# Patient Record
Sex: FEMALE | Race: WHITE | NOT HISPANIC OR LATINO | Employment: UNEMPLOYED | URBAN - NONMETROPOLITAN AREA
[De-identification: names, ages, dates, MRNs, and addresses within clinical notes are randomized per-mention and may not be internally consistent; named-entity substitution may affect disease eponyms.]

---

## 2017-01-16 DIAGNOSIS — I10 ESSENTIAL HYPERTENSION: ICD-10-CM

## 2017-01-16 NOTE — TELEPHONE ENCOUNTER
Was the patient seen in the last year in this department? Yes     Does patient have an active prescription for medications requested? Yes     Received Request Via: Pharmacy

## 2017-01-17 RX ORDER — LISINOPRIL AND HYDROCHLOROTHIAZIDE 20; 12.5 MG/1; MG/1
1 TABLET ORAL DAILY
Qty: 30 TAB | Refills: 11 | Status: SHIPPED | OUTPATIENT
Start: 2017-01-17 | End: 2018-02-02 | Stop reason: SDUPTHER

## 2017-02-01 ENCOUNTER — OFFICE VISIT (OUTPATIENT)
Dept: MEDICAL GROUP | Facility: CLINIC | Age: 57
End: 2017-02-01
Payer: MEDICAID

## 2017-02-01 VITALS
SYSTOLIC BLOOD PRESSURE: 138 MMHG | BODY MASS INDEX: 42 KG/M2 | WEIGHT: 246 LBS | DIASTOLIC BLOOD PRESSURE: 98 MMHG | TEMPERATURE: 98.3 F | OXYGEN SATURATION: 98 % | HEART RATE: 108 BPM | RESPIRATION RATE: 20 BRPM | HEIGHT: 64 IN

## 2017-02-01 DIAGNOSIS — K21.9 GASTROESOPHAGEAL REFLUX DISEASE, ESOPHAGITIS PRESENCE NOT SPECIFIED: ICD-10-CM

## 2017-02-01 DIAGNOSIS — M25.511 CHRONIC RIGHT SHOULDER PAIN: ICD-10-CM

## 2017-02-01 DIAGNOSIS — G89.29 CHRONIC RIGHT SHOULDER PAIN: ICD-10-CM

## 2017-02-01 DIAGNOSIS — M25.552 PAIN OF BOTH HIP JOINTS: ICD-10-CM

## 2017-02-01 DIAGNOSIS — M25.551 PAIN OF BOTH HIP JOINTS: ICD-10-CM

## 2017-02-01 DIAGNOSIS — R06.02 SOB (SHORTNESS OF BREATH): ICD-10-CM

## 2017-02-01 DIAGNOSIS — M54.41 BILATERAL LOW BACK PAIN WITH RIGHT-SIDED SCIATICA, UNSPECIFIED CHRONICITY: ICD-10-CM

## 2017-02-01 PROCEDURE — 99214 OFFICE O/P EST MOD 30 MIN: CPT | Performed by: NURSE PRACTITIONER

## 2017-02-01 RX ORDER — HYDROCODONE BITARTRATE AND ACETAMINOPHEN 10; 325 MG/1; MG/1
1 TABLET ORAL EVERY 6 HOURS PRN
Qty: 84 TAB | Refills: 0 | Status: SHIPPED | OUTPATIENT
Start: 2017-02-01 | End: 2017-02-01 | Stop reason: SDUPTHER

## 2017-02-01 RX ORDER — HYDROCODONE BITARTRATE AND ACETAMINOPHEN 10; 325 MG/1; MG/1
1 TABLET ORAL EVERY 6 HOURS PRN
Qty: 84 TAB | Refills: 0 | Status: SHIPPED | OUTPATIENT
Start: 2017-02-01 | End: 2017-03-30 | Stop reason: SDUPTHER

## 2017-02-01 RX ORDER — OMEPRAZOLE 20 MG/1
20 CAPSULE, DELAYED RELEASE ORAL DAILY
Qty: 30 CAP | Refills: 3 | Status: SHIPPED | OUTPATIENT
Start: 2017-02-01 | End: 2017-03-30

## 2017-02-01 RX ORDER — MELOXICAM 15 MG/1
TABLET ORAL
Qty: 30 TAB | Refills: 2 | Status: SHIPPED | OUTPATIENT
Start: 2017-02-01 | End: 2017-03-30

## 2017-02-01 RX ORDER — ALBUTEROL SULFATE 90 UG/1
2 AEROSOL, METERED RESPIRATORY (INHALATION) EVERY 6 HOURS PRN
Qty: 8.5 G | Refills: 2 | Status: SHIPPED | OUTPATIENT
Start: 2017-02-01 | End: 2017-05-25 | Stop reason: SDUPTHER

## 2017-02-01 ASSESSMENT — PAIN SCALES - GENERAL: PAINLEVEL: 9=SEVERE PAIN

## 2017-02-01 NOTE — PROGRESS NOTES
Chief Complaint   Patient presents with   • Medication Refill     Norco/ wants prilosec/ albuterol       HISTORY OF PRESENT ILLNESS: Patient is a 56 y.o. female established patient who presents today to request medication refills and to discuss her health concerns as outlined below.      Pain of both hip joints  This is a chronic problem. Patient complains of bilateral hip pain, right worse than left. 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 She ambulates with a cane. She is requesting a referral to orthopedics for further evaluation. She has not had any x-rays. She does take meloxicam and hydrocodone for her chronic back pain. She states her symptoms have been severe and she has used her medication more frequently than usual. I will increase her dose. Patient will obtain current studies and follow-up in 2 months.     Chronic right shoulder pain  This is a chronic problem that has been worsening over the past few months. Patient reports an injury in 2003 and states she had an MRI. The MRI was negative for rotator cuff tear. She has had intermittent shoulder pain over the years and states her symptoms have worsened recently. She denies any recent injury, denies any numbness or tingling to her arm. She states she does occasionally drop things and is scheduled for an MRI on her cervical spine, ordered by her specialist.     Bilateral low back pain with right-sided sciatica  This is a chronic problem. Patient is under the care of Dr. Vang. She is getting an MRI next week and is considering back surgery.        Patient Active Problem List    Diagnosis Date Noted   • Pain of both hip joints 02/01/2017   • Chronic right shoulder pain 02/01/2017   • Lesion of ala of nose 06/03/2016   • Urinary frequency 06/03/2016   • Depression 05/03/2016   • Insomnia 05/03/2016   • Notalgia 05/03/2016   • Bilateral low back pain with right-sided sciatica 03/04/2016   • Anxiety  03/04/2016   • Right-sided thoracic back pain 03/04/2016       Allergies:Tetanus toxoids    Current Outpatient Prescriptions   Medication Sig Dispense Refill   • hydrocodone/acetaminophen (NORCO)  MG Tab Take 1 Tab by mouth every 6 hours as needed for Severe Pain. 84 Tab 0   • meloxicam (MOBIC) 15 MG tablet TAKE ONE TABLET BY MOUTH ONE TIME A DAY 30 Tab 2   • omeprazole (PRILOSEC) 20 MG delayed-release capsule Take 1 Cap by mouth every day. 30 Cap 3   • albuterol 108 (90 BASE) MCG/ACT Aero Soln inhalation aerosol Inhale 2 Puffs by mouth every 6 hours as needed for Shortness of Breath. 8.5 g 2   • lisinopril-hydrochlorothiazide (PRINZIDE, ZESTORETIC) 20-12.5 MG per tablet Take 1 Tab by mouth every day. 30 Tab 11   • cetirizine (ZYRTEC) 10 MG Tab TAKE ONE TABLET BY MOUTH DAILY 30 Tab 1   • amitriptyline (ELAVIL) 50 MG Tab TAKE ONE TABLET BY MOUTH AT BEDTIME AS NEEDED - GENERIC FOR ELAVIL 30 Tab 1   • meloxicam (MOBIC) 15 MG tablet TAKE ONE TABLET BY MOUTH DAILY 30 Tab 1   • hydrOXYzine (ATARAX) 50 MG Tab Take 1 Tab by mouth 3 times a day as needed for Itching or Anxiety. 90 Tab 1   • amitriptyline (ELAVIL) 50 MG Tab Take 2 Tabs by mouth at bedtime as needed. 60 Tab 2   • cetirizine (ZYRTEC) 10 MG Tab TAKE ONE TABLET BY MOUTH ONE TIME A DAY 30 Tab 2   • citalopram (CELEXA) 40 MG Tab Take 1 Tab by mouth every day. 30 Tab 11   • MethylPREDNISolone (MEDROL DOSEPAK) 4 MG Tablet Therapy Pack Use as directed 1 Kit 0   • amoxicillin-clavulanate (AUGMENTIN) 875-125 MG Tab Take 1 Tab by mouth 2 times a day. 20 Tab 0   • Albuterol (VENTOLIN INH) Inhale 2 Puffs by mouth 4 times a day as needed.       No current facility-administered medications for this visit.       Reviewed today: Past medical history, social history, and family history. Updated as needed.    Social History     Social History Narrative       ROS:  Review of Systems   Constitutional: Negative for fever, lethargy and unexplained weight loss.   Respiratory:  "Negative for cough, wheezing and SOB.   Cardiovascular: Negative for chest pain, dizziness, and leg swelling.    Gastrointestinal: Negative for nausea, vomiting, and diarrhea.   Psych: Negative for anxiety and depression.    All other systems reviewed and are negative except as in HPI.      Exam:  Blood pressure 138/98, pulse 108, temperature 36.8 °C (98.3 °F), resp. rate 20, height 1.638 m (5' 4.49\"), weight 111.585 kg (246 lb), SpO2 98 %.  General:  Well nourished, well developed female in NAD  Head: normocephalic, atraumatic  Eyes: EOM within normal limits, no conjunctival injection, no scleral icterus  Nose: symmetrical, no discharge.  Neck: no masses, range of motion within normal limits, no tracheal deviation. No obvious thyroid enlargement. No adenopathy.   Pulmonary: chest is symmetrical with respiration, no wheezes, crackles, or rhonchi. Normal effort.   Cardiovascular: regular rate and rhythm without murmurs, rubs, or gallops.  Musculoskeletal: Normal gait, grossly normal muscle tone.  Extremities: no clubbing, cyanosis, or edema.  Psych: appropriate mood, affect, judgement.   Skin: Pink, warm, dry.      Please note that this dictation was created using voice recognition software. I have made every reasonable attempt to correct obvious errors, but I expect that there are errors of grammar and possibly content that I did not discover before finalizing the note.    Assessment/Plan:  1. Pain of both hip joints  REFERRAL TO PHYSICAL THERAPY Reason for Therapy: Eval/Treat/Report    DX-HIP-BILATERAL-WITH PELVIS-2 VIEWS    hydrocodone/acetaminophen (NORCO)  MG Tab    DISCONTINUED: hydrocodone/acetaminophen (NORCO)  MG Tab    DISCONTINUED: hydrocodone/acetaminophen (NORCO)  MG Tab    Uncontrolled. Use pain medication as instructed. Obtain x-rays,  attend physical therapy. Follow-up in 2 months.   2. Chronic right shoulder pain  REFERRAL TO PHYSICAL THERAPY Reason for Therapy: Eval/Treat/Report    " hydrocodone/acetaminophen (NORCO)  MG Tab    DISCONTINUED: hydrocodone/acetaminophen (NORCO)  MG Tab    DISCONTINUED: hydrocodone/acetaminophen (NORCO)  MG Tab    Uncontrolled. Use pain medication as instructed. Obtain x-rays,  attend physical therapy. Follow-up in 2 months.   3. Bilateral low back pain with right-sided sciatica, unspecified chronicity  REFERRAL TO PHYSICAL THERAPY Reason for Therapy: Eval/Treat/Report    meloxicam (MOBIC) 15 MG tablet    Controlled. Follow-up with spine specialist as scheduled. Follow-up here as needed.   4. Gastroesophageal reflux disease, esophagitis presence not specified  omeprazole (PRILOSEC) 20 MG delayed-release capsule    Medication refilled.   5. SOB (shortness of breath)  albuterol 108 (90 BASE) MCG/ACT Aero Soln inhalation aerosol    Medication refilled.

## 2017-02-01 NOTE — ASSESSMENT & PLAN NOTE
This is a chronic problem. Patient complains of bilateral hip pain, right worse than left. 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 She ambulates with a cane. She is requesting a referral to orthopedics for further evaluation. She has not had any x-rays. She does take meloxicam and hydrocodone for her chronic back pain. She states her symptoms have been severe and she has used her medication more frequently than usual. I will increase her dose. Patient will obtain current studies and follow-up in 2 months.

## 2017-02-01 NOTE — MR AVS SNAPSHOT
"        Denisse Varghesecaro   2017 3:10 PM   Office Visit   MRN: [id number]    Department:  [facility]   Dept Phone:  [phone]    Description:  Female : 1960   Provider:  SPENSER Xie           Reason for Visit     Medication Refill Norco/ wants prilosec/ albuterol      Allergies as of 2017     Allergen Noted Reactions    Tetanus Toxoids 2010         You were diagnosed with     Pain of both hip joints   [6115214]       Chronic right shoulder pain   [565924]       Bilateral low back pain with right-sided sciatica, unspecified chronicity   [6293162]       Chronic bilateral low back pain with right-sided sciatica   [5946472]   Uncontrolled. Continue current medications. Follow-up with pain management and spine surgery as scheduled. Follow-up here in 3 months.      Vital Signs     Blood Pressure Pulse Temperature Respirations Height Weight    138/98 mmHg 108 36.8 °C (98.3 °F) 20 1.638 m (5' 4.49\") 111.585 kg (246 lb)    Body Mass Index Oxygen Saturation Smoking Status             41.59 kg/m2 98% Current Every Day Smoker         Basic Information     Date Of Birth Sex Race Ethnicity Preferred Language    1960 Female White Non- English      Your appointments     Mar 30, 2017  2:40 PM   NEW TO YOU with SPENSER Acevedo   [facility] (--)    [address] [address]   [phone]              Problem List              ICD-10-CM Priority Class Noted - Resolved    Bilateral low back pain with right-sided sciatica M54.41   3/4/2016 - Present    Anxiety F41.9   3/4/2016 - Present    Right-sided thoracic back pain M54.6   3/4/2016 - Present    Depression F32.9   5/3/2016 - Present    Insomnia G47.00   5/3/2016 - Present    Notalgia M54.9   5/3/2016 - Present    Lesion of ala of nose L98.9   6/3/2016 - Present    Urinary frequency R35.0   6/3/2016 - Present    Pain of both hip joints M25.551, M25.552   2017 - " Present    Chronic right shoulder pain M25.511, G89.29   2/1/2017 - Present      Health Maintenance        Date Due Completion Dates    IMM DTaP/Tdap/Td Vaccine (1 - Tdap) 10/29/1979 ---    PAP SMEAR 10/29/1981 ---    MAMMOGRAM 10/29/2000 ---    COLONOSCOPY 10/29/2010 ---    IMM INFLUENZA (1) 9/1/2016 ---            Current Immunizations     No immunizations on file.      Below and/or attached are the medications your provider expects you to take. Review all of your home medications and newly ordered medications with your provider and/or pharmacist. Follow medication instructions as directed by your provider and/or pharmacist. Please keep your medication list with you and share with your provider. Update the information when medications are discontinued, doses are changed, or new medications (including over-the-counter products) are added; and carry medication information at all times in the event of emergency situations     Allergies:  TETANUS TOXOIDS - (reactions not documented)               Medications  Valid as of: February 01, 2017 -  3:41 PM    Generic Name Brand Name Tablet Size Instructions for use    Albuterol   Inhale 2 Puffs by mouth 4 times a day as needed.        Amitriptyline HCl (Tab) ELAVIL 50 MG Take 2 Tabs by mouth at bedtime as needed.        Amitriptyline HCl (Tab) ELAVIL 50 MG TAKE ONE TABLET BY MOUTH AT BEDTIME AS NEEDED - GENERIC FOR ELAVIL        Amoxicillin-Pot Clavulanate (Tab) AUGMENTIN 875-125 MG Take 1 Tab by mouth 2 times a day.        Cetirizine HCl (Tab) ZYRTEC 10 MG TAKE ONE TABLET BY MOUTH ONE TIME A DAY        Cetirizine HCl (Tab) ZYRTEC 10 MG TAKE ONE TABLET BY MOUTH DAILY        Citalopram Hydrobromide (Tab) CELEXA 40 MG Take 1 Tab by mouth every day.        Hydrocodone-Acetaminophen (Tab) NORCO  MG Take 1 Tab by mouth every 6 hours as needed for Severe Pain.        HydrOXYzine HCl (Tab) ATARAX 50 MG Take 1 Tab by mouth 3 times a day as needed for Itching or Anxiety.           Lisinopril-Hydrochlorothiazide (Tab) PRINZIDE, ZESTORETIC 20-12.5 MG Take 1 Tab by mouth every day.        Meloxicam (Tab) MOBIC 15 MG TAKE ONE TABLET BY MOUTH ONE TIME A DAY        Meloxicam (Tab) MOBIC 15 MG TAKE ONE TABLET BY MOUTH DAILY        MethylPREDNISolone (Tablet Therapy Pack) MEDROL DOSEPAK 4 MG Use as directed        Omeprazole (CAPSULE DELAYED RELEASE) PRILOSEC 20 MG Take 1 Cap by mouth every day.        .                 Medicines prescribed today were sent to:     [facility] - [place], [place] - [address]    [address] [facility] [address]    Phone: [phone] Fax: [phone]    Open 24 Hours?: No      Medication refill instructions:       If your prescription bottle indicates you have medication refills left, it is not necessary to call your provider’s office. Please contact your pharmacy and they will refill your medication.    If your prescription bottle indicates you do not have any refills left, you may request refills at any time through one of the following ways: The online [employer] system (except Urgent Care), by calling your provider’s office, or by asking your pharmacy to contact your provider’s office with a refill request. Medication refills are processed only during regular business hours and may not be available until the next business day. Your provider may request additional information or to have a follow-up visit with you prior to refilling your medication.   *Please Note: Medication refills are assigned a new Rx number when refilled electronically. Your pharmacy may indicate that no refills were authorized even though a new prescription for the same medication is available at the pharmacy. Please request the medicine by name with the pharmacy before contacting your provider for a refill.        Your To Do List     Future Labs/Procedures Complete By Expires    DX-HIP-BILATERAL-WITH PELVIS-2 VIEWS  As directed 2/1/2018      Referral     A  referral request has been sent to our patient care coordination department. Please allow 3-5 business days for us to process this request and contact you either by phone or mail. If you do not hear from us by the 5th business day, please call us at [phone].           [employer] Access Code: [email or web address]  Expires: 2/12/2017  2:15 PM    [employer]  A secure, online tool to manage your health information     [employer]’s [employer]® is a secure, online tool that connects you to your personalized health information from the privacy of your home -- day or night - making it very easy for you to manage your healthcare. Once the activation process is completed, you can even access your medical information using the [employer] petrona, which is available for free in the Apple Petrona store or Google Play store.     [employer] provides the following levels of access (as shown below):   My Chart Features   [facility] Primary Care Doctor [facility]  Specialists [facility]  Urgent  Care Non-[facility]  Primary Care  Doctor   Email your healthcare team securely and privately 24/7 X X X    Manage appointments: schedule your next appointment; view details of past/upcoming appointments X      Request prescription refills. X      View recent personal medical records, including lab and immunizations X X X X   View health record, including health history, allergies, medications X X X X   Read reports about your outpatient visits, procedures, consult and ER notes X X X X   See your discharge summary, which is a recap of your hospital and/or ER visit that includes your diagnosis, lab results, and care plan. X X       How to register for [employer]:  1. Go to  [email or web address].  2. Click on the Sign Up Now box, which takes you to the New Member Sign Up page. You will need to provide the following information:  a. Enter your [employer] Access Code exactly as it appears at the top of this page. (You will not need to use this code after you’ve completed the  sign-up process. If you do not sign up before the expiration date, you must request a new code.)   b. Enter your date of birth.   c. Enter your home email address.   d. Click Submit, and follow the next screen’s instructions.  3. Create a [employer] ID. This will be your [employer] login ID and cannot be changed, so think of one that is secure and easy to remember.  4. Create a [employer] password. You can change your password at any time.  5. Enter your Password Reset Question and Answer. This can be used at a later time if you forget your password.   6. Enter your e-mail address. This allows you to receive e-mail notifications when new information is available in [employer].  7. Click Sign Up. You can now view your health information.    For assistance activating your [employer] account, call [phone]

## 2017-02-01 NOTE — ASSESSMENT & PLAN NOTE
This is a chronic problem that has been worsening over the past few months. Patient reports an injury in 2003 and states she had an MRI. The MRI was negative for rotator cuff tear. She has had intermittent shoulder pain over the years and states her symptoms have worsened recently. She denies any recent injury, denies any numbness or tingling to her arm. She states she does occasionally drop things and is scheduled for an MRI on her cervical spine, ordered by her specialist.

## 2017-02-01 NOTE — ASSESSMENT & PLAN NOTE
This is a chronic problem. Patient is under the care of Dr. Vang. She is getting an MRI next week and is considering back surgery.

## 2017-03-06 DIAGNOSIS — F41.9 ANXIETY: ICD-10-CM

## 2017-03-06 RX ORDER — HYDROXYZINE 50 MG/1
50 TABLET, FILM COATED ORAL 3 TIMES DAILY PRN
Qty: 90 TAB | Refills: 1 | Status: CANCELLED | OUTPATIENT
Start: 2017-03-06

## 2017-03-17 DIAGNOSIS — F41.9 ANXIETY: ICD-10-CM

## 2017-03-17 RX ORDER — HYDROXYZINE 50 MG/1
50 TABLET, FILM COATED ORAL 3 TIMES DAILY PRN
Qty: 90 TAB | Refills: 1 | Status: SHIPPED | OUTPATIENT
Start: 2017-03-17 | End: 2017-05-23 | Stop reason: SDUPTHER

## 2017-03-17 NOTE — TELEPHONE ENCOUNTER
Was the patient seen in the last year in this department? Yes     Does patient have an active prescription for medications requested? Yes     Received Request Via: Patient

## 2017-03-21 ENCOUNTER — TELEPHONE (OUTPATIENT)
Dept: MEDICAL GROUP | Facility: CLINIC | Age: 57
End: 2017-03-21

## 2017-03-30 ENCOUNTER — OFFICE VISIT (OUTPATIENT)
Dept: MEDICAL GROUP | Facility: CLINIC | Age: 57
End: 2017-03-30
Payer: MEDICAID

## 2017-03-30 VITALS
BODY MASS INDEX: 44.12 KG/M2 | TEMPERATURE: 97.9 F | WEIGHT: 249 LBS | HEART RATE: 103 BPM | HEIGHT: 63 IN | DIASTOLIC BLOOD PRESSURE: 86 MMHG | RESPIRATION RATE: 18 BRPM | OXYGEN SATURATION: 98 % | SYSTOLIC BLOOD PRESSURE: 132 MMHG

## 2017-03-30 DIAGNOSIS — R35.89 POLYURIA: ICD-10-CM

## 2017-03-30 DIAGNOSIS — F41.9 ANXIETY: ICD-10-CM

## 2017-03-30 DIAGNOSIS — Z76.89 ESTABLISHING CARE WITH NEW DOCTOR, ENCOUNTER FOR: ICD-10-CM

## 2017-03-30 DIAGNOSIS — E66.01 MORBID OBESITY WITH BMI OF 40.0-44.9, ADULT (HCC): ICD-10-CM

## 2017-03-30 DIAGNOSIS — Z72.0 TOBACCO ABUSE: ICD-10-CM

## 2017-03-30 DIAGNOSIS — G47.00 INSOMNIA, UNSPECIFIED TYPE: ICD-10-CM

## 2017-03-30 DIAGNOSIS — G89.29 CHRONIC BILATERAL LOW BACK PAIN WITH RIGHT-SIDED SCIATICA: ICD-10-CM

## 2017-03-30 DIAGNOSIS — F33.1 MODERATE EPISODE OF RECURRENT MAJOR DEPRESSIVE DISORDER (HCC): ICD-10-CM

## 2017-03-30 DIAGNOSIS — Z79.891 CHRONIC USE OF OPIATE DRUGS THERAPEUTIC PURPOSES: ICD-10-CM

## 2017-03-30 DIAGNOSIS — M54.41 CHRONIC BILATERAL LOW BACK PAIN WITH RIGHT-SIDED SCIATICA: ICD-10-CM

## 2017-03-30 DIAGNOSIS — R21 RASH AND NONSPECIFIC SKIN ERUPTION: ICD-10-CM

## 2017-03-30 DIAGNOSIS — M25.511 CHRONIC RIGHT SHOULDER PAIN: ICD-10-CM

## 2017-03-30 DIAGNOSIS — G89.29 CHRONIC RIGHT SHOULDER PAIN: ICD-10-CM

## 2017-03-30 PROCEDURE — 99214 OFFICE O/P EST MOD 30 MIN: CPT | Performed by: NURSE PRACTITIONER

## 2017-03-30 RX ORDER — HYDROCODONE BITARTRATE AND ACETAMINOPHEN 10; 325 MG/1; MG/1
1 TABLET ORAL EVERY 6 HOURS PRN
Qty: 84 TAB | Refills: 0 | Status: SHIPPED | OUTPATIENT
Start: 2017-03-30 | End: 2017-03-30 | Stop reason: SDUPTHER

## 2017-03-30 RX ORDER — HYDROCODONE BITARTRATE AND ACETAMINOPHEN 10; 325 MG/1; MG/1
1 TABLET ORAL EVERY 6 HOURS PRN
Qty: 84 TAB | Refills: 0 | Status: SHIPPED | OUTPATIENT
Start: 2017-03-30 | End: 2017-04-20

## 2017-03-30 RX ORDER — ESOMEPRAZOLE MAGNESIUM 20 MG/1
20 GRANULE, DELAYED RELEASE ORAL DAILY
COMMUNITY
End: 2017-05-25 | Stop reason: SDUPTHER

## 2017-03-30 ASSESSMENT — ENCOUNTER SYMPTOMS: DEPRESSION: 1

## 2017-03-30 ASSESSMENT — LIFESTYLE VARIABLES: HISTORY_ALCOHOL_USE: 0

## 2017-03-30 NOTE — PROGRESS NOTES
"Chief Complaint   Patient presents with   • Other     new to you was a Felicia patient   • Medication Refill        Denisse Ramirez is a 56 y.o. female here today to establish care and to discuss the evaluation and management of:    HPI:      Bilateral low back pain with right-sided sciatica  Patient was seen by GELY Cote in early 2016. At that time she was referred to neurosurgery in [place]. She has not been seen by neurosurgery as she needs an MRI which has been ordered. She has not scheduled her MRI at this point.        Chronic right shoulder pain  MRI normal per her report, this is not on file. This was completed in [place] per her report.   Dislocated as a child   Reports ongoing pain since that time.    Anxiety  Patient requests prescription for Lorazepam, reports that her anxiety is not well controlled. She has not seen psych in the past.    Chronic use of opiate drugs therapeutic purposes  Patient has been prescribed previously with Norco 10/325 mg every 6 hours as needed for severe pain by prior PCP GELY Xie. For the last few prescriptions she has been given only 84 pills to last 30 days. She reports she is currently out of her medication and is requesting a refill.    Insomnia  Patient currently is being prescribed amitriptyline 10 mg nightly, reports good symptom control however reports the medication makes her feel \"dry\". Reports that she failed trazodone therapy, reports she would have 'withdrawals' if she did not take the medication consistently.    Moderate episode of recurrent major depressive disorder (CMS-[facility])  Patient denies SI/HI. She has not been seen by psych. Reports that hydroxyzine is not controlling her anxiety as well as lorazepam would.    Morbid obesity with BMI of 40.0-44.9, adult ([facility])  Patient admits that she is having more pain with increased weight on her. She is interested in bariatric consultation.    Rash and nonspecific skin eruption  This is a new " problem. Patient reports intermittent but bite-like rash that itches. Reports that she is concerned it has not cleared at this time, usually it does clear up on its own with over-the-counter creams.    Consequences of Chronic Opiate therapy:    Analgesia:  improved  Activity:  worsened  Adverse Events:  None   Aberrant Behaviors:  None   Affect/Mood: good grooming, normal speech pattern and content, normal thought patterns, normal perception  Last Urine Drug Screen:   Unknown, will complete today    Appropriate Imaging done:   No, it has been ordered so she may see Neurosurgery    Experiencing any side effects from current pain relievers? none    Current medicines (including changes today)  Current Outpatient Prescriptions   Medication Sig Dispense Refill   • Esomeprazole Magnesium (NEXIUM) 20 MG Pack Take 20 mg by mouth every day.     • hydrocodone/acetaminophen (NORCO)  MG Tab Take 1 Tab by mouth every 6 hours as needed for Severe Pain. 84 Tab 0   • hydrOXYzine (ATARAX) 50 MG Tab Take 1 Tab by mouth 3 times a day as needed for Itching or Anxiety. 90 Tab 1   • albuterol 108 (90 BASE) MCG/ACT Aero Soln inhalation aerosol Inhale 2 Puffs by mouth every 6 hours as needed for Shortness of Breath. 8.5 g 2   • lisinopril-hydrochlorothiazide (PRINZIDE, ZESTORETIC) 20-12.5 MG per tablet Take 1 Tab by mouth every day. 30 Tab 11   • meloxicam (MOBIC) 15 MG tablet TAKE ONE TABLET BY MOUTH DAILY 30 Tab 1   • amitriptyline (ELAVIL) 50 MG Tab Take 2 Tabs by mouth at bedtime as needed. 60 Tab 2   • cetirizine (ZYRTEC) 10 MG Tab TAKE ONE TABLET BY MOUTH ONE TIME A DAY 30 Tab 2   • citalopram (CELEXA) 40 MG Tab Take 1 Tab by mouth every day. 30 Tab 11     No current facility-administered medications for this visit.       She  has a past medical history of Hypertension; Anxiety; Depression; Insomnia; and Deep vein blood clot of right lower extremity (CMS-HCC).    She  has past surgical history that includes tonsillectomy and  "abdominal exploration.     Social History   Substance Use Topics   • Smoking status: Current Every Day Smoker -- 0.50 packs/day for 15 years     Types: Cigarettes   • Smokeless tobacco: Never Used      Comment: quit 3 years ago and smoked 1 pk a day for 35 years   • Alcohol Use: 3.5 oz/week     7 Glasses of wine per week       Social History     Social History Narrative       Family History   Problem Relation Age of Onset   • Lung Disease Mother    • Cancer Father    • Heart Disease Father    • Lung Disease Brother    • Lung Disease Brother        Family Status   Relation Status Death Age   • Mother Alive    • Father Alive    • Sister Alive    • Brother Alive    • Brother Alive    • Brother Alive        ROS   Constitutional: Denies fever, chills, or sweats  Eyes: negative for visual blurring, double vision, eye pain, floaters and discharge from eyes  ENT: negative for tinnitus, vertigo, frequent URI's, sinus trouble, persistent sore throat  Respiratory: negative for persistent cough, hemoptysis, dyspnea, wheezing  Cardiovascular: negative for palpitations, chest pain, or peripheral edema.  Gastrointestinal: negative for poor appetite, dysphagia, nausea, heartburn, abdominal pain, hemorrhoids, constipation or diarrhea  Genitourinary: negative for dysuria. negative for abnormal uterine bleeding, vaginal discharge   Musculoskeletal: negative for joint swelling. Reports chronic back pain and right shoulder pain.  Skin: negative for rash, scaling, hair or nail changes.  Neurologic: negative for migraine headaches, involuntary movements or tremor  Psychiatric: negative for excessive alcohol consumption or illegal drug usage, sleep disturbance, anxiety, depression  Hematologic/Lymphatic/Immunologic: negative for unusual bruising, swollen glands  Endocrine: Positive for polydipsia, polyuria, weight gain.      Objective:     Blood pressure 132/86, pulse 103, temperature 36.6 °C (97.9 °F), resp. rate 18, height 1.6 m (5' 3\"), " weight 112.946 kg (249 lb), last menstrual period 03/30/2009, SpO2 98 %, peak flow 4 L/min. Body mass index is 44.12 kg/(m^2).    Physical Exam:   Constitutional: Alert, no distress.  Eye: Equal, round and reactive, conjunctiva clear, lids normal.  ENMT: Lips without lesions, good dentition, oropharynx clear.   Neck: Trachea midline, no masses, no thyromegaly. No cervical or supraclavicular lymphadenopathy.   Respiratory: Unlabored respiratory effort, lungs clear to auscultation, no wheezes, no ronchi.  Cardiovascular: Normal S1, S2, no murmur, no edema. Capillary refill < 2 seconds in UE bilaterally.   Skin: Warm, dry, good turgor, there are several healed/scarred what appeared to be previous bug bite lesions on her upper trunk. No redness or signs of skin infection.  Neuro:  normal sensation in extremities.   Psych: Alert and oriented x3, normal affect and mood.    Spine/Back Exam:   Denies changes in bowel/bladder. No numbness to perineal area.   Strength 5/5 prox and distal LE.     Moderate tenderness in paraspinous muscles lumbar spine without current spasm. No spinous process tenderness.     Patient unable to sit for long on exam table, needs a back to her chair for support. She moves slowly and cautiously from exam table to chair.        Assessment and Plan:   The following treatment plan was discussed    1. Establishing care with new doctor, encounter for    2. Morbid obesity with BMI of 40.0-44.9, adult (CMS-[facility])  Encouraged weight loss, I placed a referral for bariatric surgery consult.  - Patient identified as having weight management issue.  Appropriate orders and counseling given.  - REFERRAL TO BARIATRIC SURGERY  - LIPID PROFILE; Future  - TSH WITH REFLEX TO FT4; Future  - COMP METABOLIC PANEL; Future  - CBC WITH DIFFERENTIAL; Future    3. Tobacco abuse  Encouraged patient to quit smoking. She is not ready to quit today.    4. Chronic bilateral low back pain with right-sided sciatica  Patient completed  UDS and drug screen today. Advised patient I will not increase dosing. Prescribed Norco 10/325 #84 which she must make last 30 days. Provided 3 months worth of prescriptions, advised patient she must complete MRI and have appointment scheduled with neurosurgery, if at her next appointment this has not been completed, we will wean patient off medications and the following 3 months. Patient is amenable to this plan.  - CONTROLLED SUBSTANCE TREATMENT AGREEMENT  - [facility] PAIN MANAGEMENT SCREEN; Future    5. Chronic right shoulder pain  I have no records to review on file at this time. Per patient's report MRI was normal.    6. Insomnia, unspecified type  Advised patient to continue with amitriptyline on her milligrams at night. I'm suspicious of possible diabetes considering her symptoms, will await lab results.    7. Anxiety  Continue with hydroxyzine 50 mg up to 3 times daily as needed. Advised patient of risks of taking benzodiazepines with opiates. I will not prescribe benzodiazepines. She is not interested in seeing psych at this time, maybe in the future.    8. Moderate episode of recurrent major depressive disorder (CMS-HCC)  Patient denies SI. At this time she does appear stable.    9. Rash and nonspecific skin eruption  Encourage patient not to itch her skin. We'll await lab results.     10. Chronic use of opiate drugs therapeutic purposes  See back pain about above please.   - CONTROLLED SUBSTANCE TREATMENT AGREEMENT  - [facility] PAIN MANAGEMENT SCREEN; Future    11. Polyuria  - HEMOGLOBIN A1C; Future       Reviewed indication, dosage, usage and potential adverse effects of prescribed medications. Patient appears to understand, verbalizes understanding and is willing to try medications as prescribed.       Discussed at length with patient use of non-pharmacological treatments as adjuncts for current pain medicine. Advised prescription is a controlled substance which is potentially habit-forming. Its use  is regulated by the [facility]. It must be submitted to the pharmacy within 5 days of the date written and can not be called in or faxed to the pharmacy. Any refill requires a new prescription that must be obtained from this office during regular office hours. This medicine can cause nausea, significant constipation, sedation, confusion. I have advised patient to keep medication in a safe place and to not drive with medication.    I reviewed risks, side effects, and interactions of medications, including over-the-counter medications. I recommend avoid use of benzodiazepines due to risk of interaction with opioid analgesics. I advise the patient to avoid alcohol and marijuana use or any illicits with prescribed medication. I reviewed the controlled substance prescribing program, including the risks of operating any vehicle or machinery with use of the prescribed medications, and adverse affects such as dependence, tolerance, addiction, and withdrawal with use of opioid analgesics and/or benzodiazepines.     I have reviewed the medical records, the [place] Prescription Monitoring Program and I have determined that controlled substance treatment is medically indicated.      Reviewed risks and benefits of treatment plan. Patient verbally agrees to plan of care.     Records requested.    Followup: Return in about 3 months (around 6/30/2017) for Short.    TORI Acevedo.     PLEASE NOTE: This dictation was created using voice recognition software. I have made every reasonable attempt to correct obvious errors, but I expect that there may be errors of grammar and possibly content that I did not discover prior finalizing this note.

## 2017-03-30 NOTE — ASSESSMENT & PLAN NOTE
Patient denies SI/HI. She has not been seen by psych. Reports that hydroxyzine is not controlling her anxiety as well as lorazepam would.

## 2017-03-30 NOTE — ASSESSMENT & PLAN NOTE
Patient requests prescription for Lorazepam, reports that her anxiety is not well controlled. She has not seen psych in the past.

## 2017-03-30 NOTE — ASSESSMENT & PLAN NOTE
This is a new problem. Patient reports intermittent but bite-like rash that itches. Reports that she is concerned it has not cleared at this time, usually it does clear up on its own with over-the-counter creams.

## 2017-03-30 NOTE — ASSESSMENT & PLAN NOTE
Patient was seen by GELY Cote in early 2016. At that time she was referred to neurosurgery in [place]. She has not been seen by neurosurgery as she needs an MRI which has been ordered. She has not scheduled her MRI at this point.

## 2017-03-30 NOTE — ASSESSMENT & PLAN NOTE
Patient has been prescribed previously with Norco 10/325 mg every 6 hours as needed for severe pain by prior PCP GELY Xie. For the last few prescriptions she has been given only 84 pills to last 30 days. She reports she is currently out of her medication and is requesting a refill.

## 2017-03-30 NOTE — ASSESSMENT & PLAN NOTE
"Patient currently is being prescribed amitriptyline 10 mg nightly, reports good symptom control however reports the medication makes her feel \"dry\". Reports that she failed trazodone therapy, reports she would have 'withdrawals' if she did not take the medication consistently.  "

## 2017-03-30 NOTE — PATIENT INSTRUCTIONS
Please obtain fasting labs prior to your next appointment. You may report to our clinic here in [place] Monday-Friday from 7:00am - 9:00am.     Please arrive fasting. Please do not eat or drink anything other than water for 8 hours prior to your arrival for labs.      Your medical care was provided today by: GELY Treviño    Thank You for the opportunity to serve you.    You may receive a brief survey in the mail shortly regarding your visit today. Please take a few moments to complete the survey and return it; no postage is necessary. We are working to serve our patient population better, improve customer service and our patients overall experience and your input can help us to accomplish this. We thank you for your help and for the opportunity to serve you today and in the future.     Special Instructions:  Always call 9-1-1 immediately if you develop a life threatening emergency.    Unless told otherwise please take all medications as directed and complete prescription therapies.     Watch for the following signs that require additional evaluation: progressive lethargy or unresponsiveness, localized pain (chest, abdomen), shortness of breath, painful breathing, progressive vomiting with weakness, bloody stools, or new rash.     If you are prescribed pain medication or any other medication that is sedating, do not take medication before or while operating a vehicle or heavy machinery or equipment due to potential side effects such as drowsiness and/or dizziness.

## 2017-03-30 NOTE — MR AVS SNAPSHOT
"        Denisse Ramirez   3/30/2017 2:40 PM   Office Visit   MRN: [id number]    Department:  [facility] Phone:  [phone]    Description:  Female : 1960   Provider:  SPENSER Acevedo           Reason for Visit     Other new to you was a Felicia patient    Medication Refill           Allergies as of 3/30/2017     Allergen Noted Reactions    Tetanus Toxoids 2010         You were diagnosed with     Establishing care with new doctor, encounter for   [799873]       Morbid obesity with BMI of 40.0-44.9, adult (CMS-HCC)   [935766]       Tobacco abuse   [558588]       Chronic bilateral low back pain with right-sided sciatica   [9865365]       Chronic right shoulder pain   [050597]       Insomnia, unspecified type   [2295807]       Anxiety   [600606]       Pain of both hip joints   [4941421]       Moderate episode of recurrent major depressive disorder (CMS-HCC)   [9121425]       Rash and nonspecific skin eruption   [523533]       Chronic use of opiate drugs therapeutic purposes   [4794617]       Polyuria   [788.42.ICD-9-CM]         Vital Signs     Blood Pressure Pulse Temperature Respirations Height Weight    132/86 mmHg 103 36.6 °C (97.9 °F) 18 1.6 m (5' 3\") 112.946 kg (249 lb)    Body Mass Index Oxygen Saturation Peak Flow Last Menstrual Period Smoking Status       44.12 kg/m2 98% 4 L/min 2009 Current Every Day Smoker       Basic Information     Date Of Birth Sex Race Ethnicity Preferred Language    1960 Female White Non- English      Problem List              ICD-10-CM Priority Class Noted - Resolved    Bilateral low back pain with right-sided sciatica M54.41   3/4/2016 - Present    Anxiety F41.9   3/4/2016 - Present    Moderate episode of recurrent major depressive disorder (CMS-HCC) F33.1   5/3/2016 - Present    Insomnia G47.00   5/3/2016 - Present    Notalgia M54.9   5/3/2016 - Present    Lesion of ala of nose L98.9   6/3/2016 - Present    Pain of both " hip joints M25.551, M25.552   2/1/2017 - Present    Chronic right shoulder pain M25.511, G89.29   2/1/2017 - Present    Morbid obesity with BMI of 40.0-44.9, adult (HCC) E66.01, Z68.41   3/30/2017 - Present    Tobacco abuse Z72.0   3/30/2017 - Present    Rash and nonspecific skin eruption R21   3/30/2017 - Present    Chronic use of opiate drugs therapeutic purposes Z79.899   3/30/2017 - Present      Health Maintenance        Date Due Completion Dates    IMM DTaP/Tdap/Td Vaccine (1 - Tdap) 10/29/1979 ---    PAP SMEAR 10/29/1981 ---    MAMMOGRAM 10/29/2000 ---    COLONOSCOPY 10/29/2010 ---    IMM INFLUENZA (1) 9/1/2016 ---            Current Immunizations     No immunizations on file.      Below and/or attached are the medications your provider expects you to take. Review all of your home medications and newly ordered medications with your provider and/or pharmacist. Follow medication instructions as directed by your provider and/or pharmacist. Please keep your medication list with you and share with your provider. Update the information when medications are discontinued, doses are changed, or new medications (including over-the-counter products) are added; and carry medication information at all times in the event of emergency situations     Allergies:  TETANUS TOXOIDS - (reactions not documented)               Medications  Valid as of: March 30, 2017 -  3:46 PM    Generic Name Brand Name Tablet Size Instructions for use    Albuterol Sulfate (Aero Soln) albuterol 108 (90 BASE) MCG/ACT Inhale 2 Puffs by mouth every 6 hours as needed for Shortness of Breath.        Amitriptyline HCl (Tab) ELAVIL 50 MG Take 2 Tabs by mouth at bedtime as needed.        Cetirizine HCl (Tab) ZYRTEC 10 MG TAKE ONE TABLET BY MOUTH ONE TIME A DAY        Citalopram Hydrobromide (Tab) CELEXA 40 MG Take 1 Tab by mouth every day.        Esomeprazole Magnesium (Pack) Esomeprazole Magnesium 20 MG Take 20 mg by mouth every day.         Hydrocodone-Acetaminophen (Tab) NORCO  MG Take 1 Tab by mouth every 6 hours as needed for Severe Pain.        HydrOXYzine HCl (Tab) ATARAX 50 MG Take 1 Tab by mouth 3 times a day as needed for Itching or Anxiety.        Lisinopril-Hydrochlorothiazide (Tab) PRINZIDE, ZESTORETIC 20-12.5 MG Take 1 Tab by mouth every day.        Meloxicam (Tab) MOBIC 15 MG TAKE ONE TABLET BY MOUTH DAILY        .                 Medicines prescribed today were sent to:     [facility] - [place], [place] - [address]    [address] [place] [address]    Phone: [phone] Fax: [phone]    Open 24 Hours?: No      Medication refill instructions:       If your prescription bottle indicates you have medication refills left, it is not necessary to call your provider’s office. Please contact your pharmacy and they will refill your medication.    If your prescription bottle indicates you do not have any refills left, you may request refills at any time through one of the following ways: The online Virtual DBS system (except Urgent Care), by calling your provider’s office, or by asking your pharmacy to contact your provider’s office with a refill request. Medication refills are processed only during regular business hours and may not be available until the next business day. Your provider may request additional information or to have a follow-up visit with you prior to refilling your medication.   *Please Note: Medication refills are assigned a new Rx number when refilled electronically. Your pharmacy may indicate that no refills were authorized even though a new prescription for the same medication is available at the pharmacy. Please request the medicine by name with the pharmacy before contacting your provider for a refill.        Your To Do List     Future Labs/Procedures Complete By Expires    CBC WITH DIFFERENTIAL  As directed 3/31/2018    COMP METABOLIC PANEL  As directed 3/31/2018    HEMOGLOBIN  A1C  As directed 3/31/2018    LIPID PROFILE  As directed 3/31/2018    [facility] PAIN MANAGEMENT SCREEN  As directed 3/30/2018    Comments:    Current Meds (name, sig, last dose):   Current outpatient prescriptions:   •  Esomeprazole Magnesium, 20 mg, Oral, DAILY  •  hydrOXYzine, 50 mg, Oral, TID PRN  •  hydrocodone/acetaminophen, 1 Tab, Oral, Q6HRS PRN  •  albuterol, 2 Puff, Inhalation, Q6HRS PRN  •  lisinopril-hydrochlorothiazide, 1 Tab, Oral, DAILY  •  meloxicam, TAKE ONE TABLET BY MOUTH DAILY  •  amitriptyline, 100 mg, Oral, HS PRN  •  cetirizine, TAKE ONE TABLET BY MOUTH ONE TIME A DAY  •  citalopram, 40 mg, Oral, DAILY          TSH WITH REFLEX TO FT4  As directed 3/30/2018      Referral     A referral request has been sent to our patient care coordination department. Please allow 3-5 business days for us to process this request and contact you either by phone or mail. If you do not hear from us by the 5th business day, please call us at [phone].        Instructions    Please obtain fasting labs prior to your next appointment. You may report to our clinic here in [place] Monday-Friday from 7:00am - 9:00am.     Please arrive fasting. Please do not eat or drink anything other than water for 8 hours prior to your arrival for labs.      Your medical care was provided today by: GELY Treviño    Thank You for the opportunity to serve you.    You may receive a brief survey in the mail shortly regarding your visit today. Please take a few moments to complete the survey and return it; no postage is necessary. We are working to serve our patient population better, improve customer service and our patients overall experience and your input can help us to accomplish this. We thank you for your help and for the opportunity to serve you today and in the future.     Special Instructions:  Always call 9-1-1 immediately if you develop a life threatening emergency.    Unless told otherwise please take all  medications as directed and complete prescription therapies.     Watch for the following signs that require additional evaluation: progressive lethargy or unresponsiveness, localized pain (chest, abdomen), shortness of breath, painful breathing, progressive vomiting with weakness, bloody stools, or new rash.     If you are prescribed pain medication or any other medication that is sedating, do not take medication before or while operating a vehicle or heavy machinery or equipment due to potential side effects such as drowsiness and/or dizziness.           MyChart Access Code: Activation code not generated  Current MyChart Status: Active          Quit Tobacco Information     Do you want to quit using tobacco?    Quitting tobacco decreases risks of cancer, heart and lung disease, increases life expectancy, improves sense of taste and smell, and increases spending money, among other benefits.    If you are thinking about quitting, we can help.  • [facility] Quit Tobacco Program: 600.852.7333  o Program occurs weekly for four weeks and includes pharmacist consultation on products to support quitting smoking or chewing tobacco. A provider referral is needed for pharmacist consultation.  • Tobacco Users Help Hotline: 9-330-QUITNOW (377-3171) or https://[place].quitlogix.org/  o Free, confidential telephone and online coaching for [place] residents. Sessions are designed on a schedule that is convenient for you. Eligible clients receive free nicotine replacement therapy.  • Nationally: www.smokefree.gov  o Information and professional assistance to support both immediate and long-term needs as you become, and remain, a non-smoker. Smokefree.gov allows you to choose the help that best fits your needs.

## 2017-03-30 NOTE — ASSESSMENT & PLAN NOTE
Patient admits that she is having more pain with increased weight on her. She is interested in bariatric consultation.

## 2017-03-30 NOTE — ASSESSMENT & PLAN NOTE
MRI normal per her report, this is not on file. This was completed in [place] per her report.   Dislocated as a child   Reports ongoing pain since that time.

## 2017-04-04 ENCOUNTER — TELEPHONE (OUTPATIENT)
Dept: MEDICAL GROUP | Facility: CLINIC | Age: 57
End: 2017-04-04

## 2017-04-04 NOTE — TELEPHONE ENCOUNTER
Patient's UDS shows positive for oxycodone, not prescribed by our clinic or on [facility].   Please contact [facility] to repeat testing on urine provided.   Patient needs appt for any further opiate refills.   Thank you.   GELY Treviño

## 2017-04-07 NOTE — TELEPHONE ENCOUNTER
Patient stated that she doesn't know what Oxycodone is.  I informed her she needs to make an appointment for any more refills

## 2017-04-12 ENCOUNTER — TELEPHONE (OUTPATIENT)
Dept: MEDICAL GROUP | Facility: CLINIC | Age: 57
End: 2017-04-12

## 2017-04-12 NOTE — TELEPHONE ENCOUNTER
1. Caller Name: Rachel from [facility]                      Call Back Number: [phone]    2. Message: Rachel from [facility] called and stated that the urine is in the process of being tested for the second time.    3. Patient approves office to leave a detailed voicemail/MyChart message: N\A

## 2017-04-14 ENCOUNTER — HOSPITAL ENCOUNTER (OUTPATIENT)
Facility: MEDICAL CENTER | Age: 57
End: 2017-04-14
Attending: NURSE PRACTITIONER
Payer: MEDICAID

## 2017-04-14 ENCOUNTER — NON-PROVIDER VISIT (OUTPATIENT)
Dept: MEDICAL GROUP | Facility: CLINIC | Age: 57
End: 2017-04-14
Payer: MEDICAID

## 2017-04-14 DIAGNOSIS — R35.89 POLYURIA: ICD-10-CM

## 2017-04-14 DIAGNOSIS — I10 ESSENTIAL HYPERTENSION: ICD-10-CM

## 2017-04-14 DIAGNOSIS — E66.01 MORBID OBESITY WITH BMI OF 40.0-44.9, ADULT (HCC): ICD-10-CM

## 2017-04-14 LAB
ALBUMIN SERPL BCP-MCNC: 4.2 G/DL (ref 3.2–4.9)
ALBUMIN/GLOB SERPL: 1.4 G/DL
ALP SERPL-CCNC: 67 U/L (ref 30–99)
ALT SERPL-CCNC: 18 U/L (ref 2–50)
ANION GAP SERPL CALC-SCNC: 7 MMOL/L (ref 0–11.9)
AST SERPL-CCNC: 20 U/L (ref 12–45)
BASOPHILS # BLD AUTO: 0.4 % (ref 0–1.8)
BASOPHILS # BLD: 0.04 K/UL (ref 0–0.12)
BILIRUB SERPL-MCNC: 0.5 MG/DL (ref 0.1–1.5)
BUN SERPL-MCNC: 14 MG/DL (ref 8–22)
CALCIUM SERPL-MCNC: 9.6 MG/DL (ref 8.5–10.5)
CHLORIDE SERPL-SCNC: 101 MMOL/L (ref 96–112)
CHOLEST SERPL-MCNC: 200 MG/DL (ref 100–199)
CO2 SERPL-SCNC: 30 MMOL/L (ref 20–33)
CREAT SERPL-MCNC: 0.84 MG/DL (ref 0.5–1.4)
EOSINOPHIL # BLD AUTO: 0.13 K/UL (ref 0–0.51)
EOSINOPHIL NFR BLD: 1.4 % (ref 0–6.9)
ERYTHROCYTE [DISTWIDTH] IN BLOOD BY AUTOMATED COUNT: 49.3 FL (ref 35.9–50)
EST. AVERAGE GLUCOSE BLD GHB EST-MCNC: 126 MG/DL
GFR SERPL CREATININE-BSD FRML MDRD: >60 ML/MIN/1.73 M 2
GLOBULIN SER CALC-MCNC: 3 G/DL (ref 1.9–3.5)
GLUCOSE SERPL-MCNC: 108 MG/DL (ref 65–99)
HBA1C MFR BLD: 6 % (ref 0–5.6)
HCT VFR BLD AUTO: 42.2 % (ref 37–47)
HDLC SERPL-MCNC: 51 MG/DL
HGB BLD-MCNC: 14 G/DL (ref 12–16)
IMM GRANULOCYTES # BLD AUTO: 0.05 K/UL (ref 0–0.11)
IMM GRANULOCYTES NFR BLD AUTO: 0.5 % (ref 0–0.9)
LDLC SERPL CALC-MCNC: 132 MG/DL
LYMPHOCYTES # BLD AUTO: 4.28 K/UL (ref 1–4.8)
LYMPHOCYTES NFR BLD: 44.7 % (ref 22–41)
MCH RBC QN AUTO: 29.7 PG (ref 27–33)
MCHC RBC AUTO-ENTMCNC: 33.2 G/DL (ref 33.6–35)
MCV RBC AUTO: 89.6 FL (ref 81.4–97.8)
MONOCYTES # BLD AUTO: 0.5 K/UL (ref 0–0.85)
MONOCYTES NFR BLD AUTO: 5.2 % (ref 0–13.4)
NEUTROPHILS # BLD AUTO: 4.58 K/UL (ref 2–7.15)
NEUTROPHILS NFR BLD: 47.8 % (ref 44–72)
NRBC # BLD AUTO: 0 K/UL
NRBC BLD AUTO-RTO: 0 /100 WBC
PLATELET # BLD AUTO: 337 K/UL (ref 164–446)
PMV BLD AUTO: 8.9 FL (ref 9–12.9)
POTASSIUM SERPL-SCNC: 3.6 MMOL/L (ref 3.6–5.5)
PROT SERPL-MCNC: 7.2 G/DL (ref 6–8.2)
RBC # BLD AUTO: 4.71 M/UL (ref 4.2–5.4)
SODIUM SERPL-SCNC: 138 MMOL/L (ref 135–145)
TRIGL SERPL-MCNC: 86 MG/DL (ref 0–149)
TSH SERPL DL<=0.005 MIU/L-ACNC: 2.64 UIU/ML (ref 0.3–3.7)
WBC # BLD AUTO: 9.6 K/UL (ref 4.8–10.8)

## 2017-04-14 PROCEDURE — 80061 LIPID PANEL: CPT

## 2017-04-14 PROCEDURE — 83036 HEMOGLOBIN GLYCOSYLATED A1C: CPT

## 2017-04-14 PROCEDURE — 36415 COLL VENOUS BLD VENIPUNCTURE: CPT | Performed by: PHYSICIAN ASSISTANT

## 2017-04-14 PROCEDURE — 80053 COMPREHEN METABOLIC PANEL: CPT

## 2017-04-14 PROCEDURE — 85025 COMPLETE CBC W/AUTO DIFF WBC: CPT

## 2017-04-14 PROCEDURE — 99000 SPECIMEN HANDLING OFFICE-LAB: CPT | Performed by: PHYSICIAN ASSISTANT

## 2017-04-14 PROCEDURE — 84443 ASSAY THYROID STIM HORMONE: CPT

## 2017-04-18 NOTE — TELEPHONE ENCOUNTER
Results scanned into media and routed to you.  The date on the test is the same as the one first done, however, on the web site it shows it was retested on 4/14 @ 04:09pm

## 2017-04-18 NOTE — TELEPHONE ENCOUNTER
Please let me know when results are available.   This is a repeat for abnormal results.   GELY Treviño

## 2017-04-20 ENCOUNTER — OFFICE VISIT (OUTPATIENT)
Dept: MEDICAL GROUP | Facility: CLINIC | Age: 57
End: 2017-04-20
Payer: MEDICAID

## 2017-04-20 VITALS
SYSTOLIC BLOOD PRESSURE: 140 MMHG | HEART RATE: 99 BPM | RESPIRATION RATE: 18 BRPM | BODY MASS INDEX: 44.65 KG/M2 | OXYGEN SATURATION: 99 % | DIASTOLIC BLOOD PRESSURE: 90 MMHG | HEIGHT: 63 IN | WEIGHT: 252 LBS | TEMPERATURE: 98.4 F

## 2017-04-20 DIAGNOSIS — E66.01 MORBID OBESITY WITH BMI OF 40.0-44.9, ADULT (HCC): ICD-10-CM

## 2017-04-20 DIAGNOSIS — R73.03 PREDIABETES: ICD-10-CM

## 2017-04-20 DIAGNOSIS — Z12.11 SCREENING FOR COLON CANCER: ICD-10-CM

## 2017-04-20 DIAGNOSIS — E78.5 MILD HYPERLIPIDEMIA: ICD-10-CM

## 2017-04-20 DIAGNOSIS — R89.2 ABNORMAL DRUG SCREEN: ICD-10-CM

## 2017-04-20 DIAGNOSIS — Z12.39 SCREENING FOR BREAST CANCER: ICD-10-CM

## 2017-04-20 DIAGNOSIS — Z23 NEED FOR VACCINATION: ICD-10-CM

## 2017-04-20 DIAGNOSIS — F43.9 STRESS AT HOME: ICD-10-CM

## 2017-04-20 DIAGNOSIS — Z28.20 VACCINE REFUSED BY PATIENT: ICD-10-CM

## 2017-04-20 PROBLEM — Z79.891 CHRONIC USE OF OPIATE DRUGS THERAPEUTIC PURPOSES: Status: RESOLVED | Noted: 2017-03-30 | Resolved: 2017-04-20

## 2017-04-20 PROCEDURE — 90732 PPSV23 VACC 2 YRS+ SUBQ/IM: CPT | Performed by: NURSE PRACTITIONER

## 2017-04-20 PROCEDURE — 99213 OFFICE O/P EST LOW 20 MIN: CPT | Mod: 25 | Performed by: NURSE PRACTITIONER

## 2017-04-20 PROCEDURE — 90471 IMMUNIZATION ADMIN: CPT | Performed by: NURSE PRACTITIONER

## 2017-04-20 NOTE — PROGRESS NOTES
Subjective:     Denisse Ramirez is a 56 y.o. female here today to discuss lab results and the following:     Abnormal drug screen  Patient was previously prescribed daily Norco for pain.   Abnormal UDS 3/3/2017  Positive for Oxy  Patient reports she may have taken her husbands medication  Discussed at length we can no longer prescribed Opiates for pain  Patient has medication available to wean off medication safely     Mild hyperlipidemia  Component Value Ref Range & Units Status      Cholesterol,Tot 200 (H) 100 - 199 mg/dL Final     Triglycerides 86 0 - 149 mg/dL Final     HDL 51 >=40 mg/dL Final      (H) <100 mg/dL Final     Most recent labs as listed above. Patient is now diagnosed with prediabetes, A1c 6.0. Patient does admit to poor diet.    Prediabetes  This is a new problem. Patient's most recent A1c 6.0. She does report polyuria and polydipsia.    Stress at home  Patient reports that she is often stressed at home, she'll start her . She currently is taking care of her grandchildren, her son was high on drugs.        Current medicines (including changes today)  Current Outpatient Prescriptions   Medication Sig Dispense Refill   • Esomeprazole Magnesium (NEXIUM) 20 MG Pack Take 20 mg by mouth every day.     • hydrOXYzine (ATARAX) 50 MG Tab Take 1 Tab by mouth 3 times a day as needed for Itching or Anxiety. 90 Tab 1   • albuterol 108 (90 BASE) MCG/ACT Aero Soln inhalation aerosol Inhale 2 Puffs by mouth every 6 hours as needed for Shortness of Breath. 8.5 g 2   • lisinopril-hydrochlorothiazide (PRINZIDE, ZESTORETIC) 20-12.5 MG per tablet Take 1 Tab by mouth every day. 30 Tab 11   • meloxicam (MOBIC) 15 MG tablet TAKE ONE TABLET BY MOUTH DAILY 30 Tab 1   • amitriptyline (ELAVIL) 50 MG Tab Take 2 Tabs by mouth at bedtime as needed. 60 Tab 2   • cetirizine (ZYRTEC) 10 MG Tab TAKE ONE TABLET BY MOUTH ONE TIME A DAY 30 Tab 2   • citalopram (CELEXA) 40 MG Tab Take 1 Tab by mouth every day. 30 Tab 11  "    No current facility-administered medications for this visit.       She  has a past medical history of Hypertension; Anxiety; Depression; Insomnia; and Deep vein blood clot of right lower extremity (CMS-HCC).    She  has past surgical history that includes tonsillectomy and abdominal exploration.     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   No fever, no chest pain, no shortness of breath, no abdominal pain, no rashes    All other systems reviewed and are negative.        Objective:     Blood pressure 140/90, pulse 99, temperature 36.9 °C (98.4 °F), resp. rate 18, height 1.6 m (5' 3\"), weight 114.306 kg (252 lb), last menstrual period 03/30/2009, SpO2 99 %. Body mass index is 44.65 kg/(m^2).    Physical Exam:   Constitutional: Alert, no distress.  Eye: Equal, round and reactive, conjunctiva clear, lids normal.   ENMT: Lips without lesions, good dentition, oropharynx clear.   Neck: Trachea midline, no masses, no thyromegaly.   Respiratory: Unlabored respiratory effort, lungs clear to auscultation, no wheezes, no ronchi.  Cardiovascular: Normal S1, S2, no murmur, no edema.   Abdomen: Soft, non-tender, no masses, no hepatosplenomegaly. Normal bowel sounds. "   Skin: Warm, dry, good turgor, healing rash noted from prior visit, improving.   Neuro: DTR 2+ LE, normal sensation in extremities.   Psych: Alert and oriented x3, normal affect and mood.        Assessment and Plan:   The following treatment plan was discussed    1. Prediabetes  Based on lifestyle and new diagnosis, would be a great candidate for care management. Discussed at length diet change and encouraged weight loss. Repeat labs in 6 months, may consider medication if symptoms worsening and/or worsening lab results. She is amenable to this plan. Discussed signs and symptoms to seek emergent care.  - REFERRAL TO COMPLEX CARE MANAGEMENT Services Requested:: Care Manager to Evaluate and Recommend  - REFERRAL TO [facility] (HIP) Services Requested:: Medicare Obesity Counseling; Reason for Visit:: Overweight/Obesity  - HEMOGLOBIN A1C; Future  - COMP METABOLIC PANEL; Future  - TSH WITH REFLEX TO FT4; Future    2. Mild hyperlipidemia  Advised to change diet, repeat in 6 months.  - LIPID PROFILE; Future    3. Abnormal drug screen  Lengthy discussion with patient regarding abnormal results. Advised we will no longer prescribed opiate medications for pain. She currently has enough medication to wean off slowly over the next couple months. I placed an FYI in the patient's chart. I did discuss we're happy to see her for any of her other healthcare needs. She verbalizes understanding.    4. Morbid obesity with BMI of 40.0-44.9, adult (HCC)  Encouraged weight loss, could benefit from dietician consult.     5. Stress at home  Provided reassurance, encouraged patient to take care of her health despite family concerns, she agrees. Discussed when to contact emergent care or help.     6. Screening for colon cancer  - REFERRAL TO GI FOR COLONOSCOPY    7. Screening for breast cancer  - [id number]; Future    8. Vaccine refused by patient  Per patient, received Tdap in [place], due in 2020.      9. Need for vaccination  - PNEUMOCOCCAL POLYSACCHARIDE VACCINE 23-VALENT =>3YO SQ/IM     Reviewed risks and benefits of treatment plan. Patient verbally agrees to plan of care.       Followup: Return in about 6 months (around 10/20/2017) for pre diabetes, lipids, Short.    TORI Acevedo.     PLEASE NOTE: This dictation was created using voice recognition software. I have made every reasonable attempt to correct obvious errors, but I expect that there may be errors of grammar and possibly content that I did not discover prior finalizing this note.

## 2017-04-20 NOTE — ASSESSMENT & PLAN NOTE
Component Value Ref Range & Units Status      Cholesterol,Tot 200 (H) 100 - 199 mg/dL Final     Triglycerides 86 0 - 149 mg/dL Final     HDL 51 >=40 mg/dL Final      (H) <100 mg/dL Final     Most recent labs as listed above. Patient is now diagnosed with prediabetes, A1c 6.0. Patient does admit to poor diet.

## 2017-04-20 NOTE — PATIENT INSTRUCTIONS
Type 2 Diabetes Mellitus, Adult  Type 2 diabetes mellitus, often simply referred to as type 2 diabetes, is a long-lasting (chronic) disease. In type 2 diabetes, the pancreas does not make enough insulin (a hormone), the cells are less responsive to the insulin that is made (insulin resistance), or both. Normally, insulin moves sugars from food into the tissue cells. The tissue cells use the sugars for energy. The lack of insulin or the lack of normal response to insulin causes excess sugars to build up in the blood instead of going into the tissue cells. As a result, high blood sugar (hyperglycemia) develops. The effect of high sugar (glucose) levels can cause many complications.   Type 2 diabetes was also previously called adult-onset diabetes, but it can occur at any age.     RISK FACTORS   A person is predisposed to developing type 2 diabetes if someone in the family has the disease and also has one or more of the following primary risk factors:  · Weight gain, or being overweight or obese.  · An inactive lifestyle.  · A history of consistently eating high-calorie foods.  Maintaining a normal weight and regular physical activity can reduce the chance of developing type 2 diabetes.  SYMPTOMS   A person with type 2 diabetes may not show symptoms initially. The symptoms of type 2 diabetes appear slowly. The symptoms include:  · Increased thirst (polydipsia).  · Increased urination (polyuria).  · Increased urination during the night (nocturia).  · Sudden or unexplained weight changes.  · Frequent, recurring infections.  · Tiredness (fatigue).  · Weakness.  · Vision changes, such as blurred vision.  · Fruity smell to your breath.  · Abdominal pain.  · Nausea or vomiting.  · Cuts or bruises which are slow to heal.  · Tingling or numbness in the hands or feet.  DIAGNOSIS  Type 2 diabetes is frequently not diagnosed until complications of diabetes are present. Type 2 diabetes is diagnosed when symptoms or complications  are present and when blood glucose levels are increased. Your blood glucose level may be checked by one or more of the following blood tests:  · A fasting blood glucose test. You will not be allowed to eat for at least 8 hours before a blood sample is taken.  · A random blood glucose test. Your blood glucose is checked at any time of the day regardless of when you ate.  · A hemoglobin A1c blood glucose test. A hemoglobin A1c test provides information about blood glucose control over the previous 3 months.  · An oral glucose tolerance test (OGTT). Your blood glucose is measured after you have not eaten (fasted) for 2 hours and then after you drink a glucose-containing beverage.  TREATMENT   · You may need to take insulin or diabetes medicine daily to keep blood glucose levels in the desired range.  · If you use insulin, you may need to adjust the dosage depending on the carbohydrates that you eat with each meal or snack.  · Lifestyle changes are recommended as part of your treatment. These may include:  ¨ Following an individualized diet plan developed by a nutritionist or dietitian.  ¨ Exercising daily.  Your health care providers will set individualized treatment goals for you based on your age, your medicines, how long you have had diabetes, and any other medical conditions you have. Generally, the goal of treatment is to maintain the following blood glucose levels:  · Before meals (preprandial):  mg/dL.  · After meals (postprandial): below 180 mg/dL.  · A1c: less than 6.5-7%.  HOME CARE INSTRUCTIONS   · Have your hemoglobin A1c level checked twice a year.  · Perform daily blood glucose monitoring as directed by your health care provider.  · Monitor urine ketones when you are ill and as directed by your health care provider.  · Take your diabetes medicine or insulin as directed by your health care provider to maintain your blood glucose levels in the desired range.  ¨ Never run out of diabetes medicine or  insulin. It is needed every day.  ¨ If you are using insulin, you may need to adjust the amount of insulin given based on your intake of carbohydrates. Carbohydrates can raise blood glucose levels but need to be included in your diet. Carbohydrates provide vitamins, minerals, and fiber which are an essential part of a healthy diet. Carbohydrates are found in fruits, vegetables, whole grains, dairy products, legumes, and foods containing added sugars.  · Eat healthy foods. You should make an appointment to see a registered dietitian to help you create an eating plan that is right for you.  · Lose weight if you are overweight.  · Carry a medical alert card or wear your medical alert jewelry.  · Carry a 15-gram carbohydrate snack with you at all times to treat low blood glucose (hypoglycemia). Some examples of 15-gram carbohydrate snacks include:  ¨ Glucose tablets, 3 or 4.  ¨ Glucose gel, 15-gram tube.  ¨ Raisins, 2 tablespoons (24 grams).  ¨ Jelly beans, 6.  ¨ Animal crackers, 8.  ¨ Regular pop, 4 ounces (120 mL).  ¨ Gummy treats, 9.  · Recognize hypoglycemia. Hypoglycemia occurs with blood glucose levels of 70 mg/dL and below. The risk for hypoglycemia increases when fasting or skipping meals, during or after intense exercise, and during sleep. Hypoglycemia symptoms can include:  ¨ Tremors or shakes.  ¨ Decreased ability to concentrate.  ¨ Sweating.  ¨ Increased heart rate.  ¨ Headache.  ¨ Dry mouth.  ¨ Hunger.  ¨ Irritability.  ¨ Anxiety.  ¨ Restless sleep.  ¨ Altered speech or coordination.  ¨ Confusion.  · Treat hypoglycemia promptly. If you are alert and able to safely swallow, follow the 15:15 rule:  ¨ Take 15-20 grams of rapid-acting glucose or carbohydrate. Rapid-acting options include glucose gel, glucose tablets, or 4 ounces (120 mL) of fruit juice, regular soda, or low-fat milk.  ¨ Check your blood glucose level 15 minutes after taking the glucose.  ¨ Take 15-20 grams more of glucose if the repeat blood  glucose level is still 70 mg/dL or below.  ¨ Eat a meal or snack within 1 hour once blood glucose levels return to normal.  · Be alert to feeling very thirsty and urinating more frequently than usual, which are early signs of hyperglycemia. An early awareness of hyperglycemia allows for prompt treatment. Treat hyperglycemia as directed by your health care provider.  · Engage in at least 150 minutes of moderate-intensity physical activity a week, spread over at least 3 days of the week or as directed by your health care provider. In addition, you should engage in resistance exercise at least 2 times a week or as directed by your health care provider. Try to spend no more than 90 minutes at one time inactive.  · Adjust your medicine and food intake as needed if you start a new exercise or sport.  · Follow your sick-day plan anytime you are unable to eat or drink as usual.  · Do not use any tobacco products including cigarettes, chewing tobacco, or electronic cigarettes. If you need help quitting, ask your health care provider.  · Limit alcohol intake to no more than 1 drink per day for nonpregnant women and 2 drinks per day for men. You should drink alcohol only when you are also eating food. Talk with your health care provider whether alcohol is safe for you. Tell your health care provider if you drink alcohol several times a week.  · Keep all follow-up visits as directed by your health care provider. This is important.  · Schedule an eye exam soon after the diagnosis of type 2 diabetes and then annually.  · Perform daily skin and foot care. Examine your skin and feet daily for cuts, bruises, redness, nail problems, bleeding, blisters, or sores. A foot exam by a health care provider should be done annually.  · Brush your teeth and gums at least twice a day and floss at least once a day. Follow up with your dentist regularly.  · Share your diabetes management plan with your workplace or school.  · Keep your  immunizations up to date. It is recommended that you receive a flu (influenza) vaccine every year. It is also recommended that you receive a pneumonia (pneumococcal) vaccine. If you are 65 years of age or older and have never received a pneumonia vaccine, this vaccine may be given as a series of two separate shots. Ask your health care provider which additional vaccines may be recommended.  · Learn to manage stress.  · Obtain ongoing diabetes education and support as needed.  · Participate in or seek rehabilitation as needed to maintain or improve independence and quality of life. Request a physical or occupational therapy referral if you are having foot or hand numbness, or difficulties with grooming, dressing, eating, or physical activity.  SEEK MEDICAL CARE IF:   · You are unable to eat food or drink fluids for more than 6 hours.  · You have nausea and vomiting for more than 6 hours.  · Your blood glucose level is over 240 mg/dL.  · There is a change in mental status.  · You develop an additional serious illness.  · You have diarrhea for more than 6 hours.  · You have been sick or have had a fever for a couple of days and are not getting better.  · You have pain during any physical activity.    SEEK IMMEDIATE MEDICAL CARE IF:  · You have difficulty breathing.  · You have moderate to large ketone levels.  MAKE SURE YOU:  · Understand these instructions.  · Will watch your condition.  · Will get help right away if you are not doing well or get worse.     This information is not intended to replace advice given to you by your health care provider. Make sure you discuss any questions you have with your health care provider.     Document Released: 12/18/2006 Document Revised: 05/03/2016 Document Reviewed: 07/16/2013  [employer] Patient Education ©2016 [employer]  Diabetes, Type 2, Am I At Risk?  Diabetes is a lasting (chronic) disease. In type 2 diabetes, the pancreas does not make enough insulin, and the  body does not respond normally to the insulin that is made. This type of diabetes was also previously called adult onset diabetes. About 90% of all those who have diabetes have type 2. It usually occurs after the age of 40, but can occur at any age.   People develop type 2 diabetes because they do not use insulin properly. Eventually, the pancreas cannot make enough insulin for the body's needs. Over time, the amount of glucose (sugar) in the blood increases.  RISK FACTORS  · Overweight  the more weight you have, the more resistant your cells become to insulin.  · Family history  you are more likely to get diabetes if a parent or sibling has diabetes.  · Race certain races get diabetes more.  ·  Americans.  · American Indians.  ·  Americans.  · Hispanics.  · .  · Inactive exercise helps control weight and helps your cells be more sensitive to insulin.  · Gestational diabetes  some women develop diabetes while they are pregnant. This goes away when they deliver. However, they are 50-60% more likely to develop type 2 diabetes at a later time.  · Having a baby over 9 pounds  a sign that you may have had gestational diabetes.  · Age the risk of diabetes goes up as you get older, especially after age 45.  · High blood pressure (hypertension).  SYMPTOMS  Many people have no signs or symptoms. Symptoms can be so mild that you might not even notice them. Some of these signs are:  · Increased thirst.  · Increased hunger.  · Tiredness (fatigue).  · Increased urination, especially at night.  · Weight loss.  · Blurred vision.  · Sores that do not heal.  WHO SHOULD BE TESTED?  · Anyone 45 years or older, especially if overweight, should consider getting tested.  · If you are younger than 45, overweight, and have one or more of the risk factors, you should consider getting tested.  DIAGNOSIS  · Fasting blood glucose (FBS). Usually, 2 are done.  · -125 mg/dl is considered pre-diabetes.  ·   mg/dl or greater is considered diabetes.  · 2 hour Oral Glucose Tolerance Test (OGTT). This test is preformed by first having you not eat or drink for several hours. You are then given something sweet to drink and your blood glucose is measured fasting, at one hour and 2 hours. This test tells how well you are able to handle sugars or carbohydrates.  · Fastin-100 mg/dl.  · 1 hour: less than 200 mg/dl.  · 2 hours: less than 140 mg/dl.  · A1c A1c is a blood glucose test that gives and average of your blood glucose over 3 months. It is the accepted method to use to diagnose diabetes.  · A1c 5.7-6.4% is considered pre-diabetes.  · A1c 6.5% or greater is considered diabetes.  WHAT DOES IT MEAN TO HAVE PRE-DIABETES?  Pre-diabetes means you are at risk for getting type 2 diabetes. Your blood glucose is higher than normal, but not yet high enough to diagnose diabetes. The good news is, if you have pre-diabetes you can reduce the risk of getting diabetes and even return to normal blood glucose levels. With modest weight loss and moderate physical activity, you can delay or prevent type 2 diabetes.   PREVENTION  You cannot do anything about race, age or family history, but you can lower your chances of getting diabetes. You can:   · Exercise regularly and be active.  · Reduce fat and calorie intake.  · Make wise food choices as much as you can.  · Reduce your intake of salt and alcohol.  · Maintain a reasonable weight.  · Keep blood pressure in an acceptable range. Take medication if needed.  · Not smoke.  · Maintain an acceptable cholesterol level (HDL, LDL, Triglycerides). Take medication if needed.  DOING MY PART: GETTING STARTED  Making big changes in your life is hard, especially if you are faced with more than one change. You can make it easier by taking these steps:  · Make a plan to change behavior.  · Decide exactly what you will do and when you will do it.  · Plan what you need to get ready.  · Think about what  might prevent you from reaching your goals.  · Find family and friends who will support and encourage you.  · Decide how you will reward yourself when you do what you have planned.  · Your doctor, dietitian, or counselor can help you make a plan.  HERE ARE SOME OF THE AREAS YOU MAY WISH TO CHANGE TO REDUCE YOUR RISK OF DIABETES.  If you are overweight or obese, choose sensible ways to get in shape. Even small amounts of weight loss, like 5-10 pounds, can help reduce the effects of insulin resistance and help blood glucose control.  Diet  · Avoid crash diets. Instead, eat less of the foods you usually have. Limit the amount of fat you eat.  · Increase your physical activity. Aim for at least 30 minutes of exercise most days of the week.  · Set a reasonable weight-loss goal, such as losing 1 pound a week. Aim for a long-term goal of losing 5-7% of your total body weight.  · Make wise food choices most of the time.  · What you eat has a big impact on your health. By making lopez food choices, you can help control your body weight, blood pressure, and cholesterol.  · Take a hard look at the serving sizes of the foods you eat. Reduce serving sizes of meat, desserts, and foods high in fat. Increase your intake of fruits and vegetables.  · Limit your fat intake to about 25% of your total calories. For example, if your food choices add up to about 2,000 calories a day, try to eat no more than 56 grams of fat. Your caregiver or a dietitian can help you figure out how much fat to have. You can check food labels for fat content too.  · You may also want to reduce the number of calories you have each day.  · Keep a food log. Write down what you eat, how much you eat, and anything else that helps keep you on track.  · When you meet your goal, reward yourself with a nonfood item or activity.  Exercise  · Be physically active every day.  · Keep and exercise log. Write down what exercise you did, for how long, and anything else that  keeps you on track.  · Regular exercise (like brisk walking) tackles several risk factors at once. It helps you lose weight, it keeps your cholesterol and blood pressure under control, and it helps your body use insulin. People who are physically active for 30 minutes a day, 5 days a week, reduced their risk of type 2 diabetes. If you are not very active, you should start slowly at first. Talk with your caregiver first about what kinds of exercise would be safe for you. Make a plan to increase your activity level with the goal of being active for at least 30 minutes a day, most days of the week.  · Choose activities you enjoy. Here are some ways to work extra activity into your daily routine:  · Take the stairs rather than an elevator or escalator.  · Park at the far end of the lot and walk.  · Get off the bus a few stops early and walk the rest of the way.  · Walk or bicycle instead of drive whenever you can.  Medications  Some people need medication to help control their blood pressure or cholesterol levels. If you do, take your medicines as directed. Ask your caregiver whether there are any medicines you can take to prevent type 2 diabetes.  Document Released: 12/20/2004 Document Revised: 03/11/2013 Document Reviewed: 09/15/2010  [employer]® Patient Information ©2014 [employer]  Diabetes Mellitus and Food  It is important for you to manage your blood sugar (glucose) level. Your blood glucose level can be greatly affected by what you eat. Eating healthier foods in the appropriate amounts throughout the day at about the same time each day will help you control your blood glucose level. It can also help slow or prevent worsening of your diabetes mellitus. Healthy eating may even help you improve the level of your blood pressure and reach or maintain a healthy weight.   General recommendations for healthful eating and cooking habits include:  · Eating meals and snacks regularly. Avoid going long periods of time  without eating to lose weight.  · Eating a diet that consists mainly of plant-based foods, such as fruits, vegetables, nuts, legumes, and whole grains.  · Using low-heat cooking methods, such as baking, instead of high-heat cooking methods, such as deep frying.  Work with your dietitian to make sure you understand how to use the Nutrition Facts information on food labels.  HOW CAN FOOD AFFECT ME?  Carbohydrates  Carbohydrates affect your blood glucose level more than any other type of food. Your dietitian will help you determine how many carbohydrates to eat at each meal and teach you how to count carbohydrates. Counting carbohydrates is important to keep your blood glucose at a healthy level, especially if you are using insulin or taking certain medicines for diabetes mellitus.  Alcohol  Alcohol can cause sudden decreases in blood glucose (hypoglycemia), especially if you use insulin or take certain medicines for diabetes mellitus. Hypoglycemia can be a life-threatening condition. Symptoms of hypoglycemia (sleepiness, dizziness, and disorientation) are similar to symptoms of having too much alcohol.   If your health care provider has given you approval to drink alcohol, do so in moderation and use the following guidelines:  · Women should not have more than one drink per day, and men should not have more than two drinks per day. One drink is equal to:  · 12 oz of beer.  · 5 oz of wine.  · 1½ oz of hard liquor.  · Do not drink on an empty stomach.  · Keep yourself hydrated. Have water, diet soda, or unsweetened iced tea.  · Regular soda, juice, and other mixers might contain a lot of carbohydrates and should be counted.  WHAT FOODS ARE NOT RECOMMENDED?  As you make food choices, it is important to remember that all foods are not the same. Some foods have fewer nutrients per serving than other foods, even though they might have the same number of calories or carbohydrates. It is difficult to get your body what it  needs when you eat foods with fewer nutrients. Examples of foods that you should avoid that are high in calories and carbohydrates but low in nutrients include:  · Trans fats (most processed foods list trans fats on the Nutrition Facts label).  · Regular soda.  · Juice.  · Candy.  · Sweets, such as cake, pie, doughnuts, and cookies.  · Fried foods.  WHAT FOODS CAN I EAT?  Eat nutrient-rich foods, which will nourish your body and keep you healthy. The food you should eat also will depend on several factors, including:  · The calories you need.  · The medicines you take.  · Your weight.  · Your blood glucose level.  · Your blood pressure level.  · Your cholesterol level.  You should eat a variety of foods, including:  · Protein.  · Lean cuts of meat.  · Proteins low in saturated fats, such as fish, egg whites, and beans. Avoid processed meats.  · Fruits and vegetables.  · Fruits and vegetables that may help control blood glucose levels, such as apples, mangoes, and yams.  · Dairy products.  · Choose fat-free or low-fat dairy products, such as milk, yogurt, and cheese.  · Grains, bread, pasta, and rice.  · Choose whole grain products, such as multigrain bread, whole oats, and brown rice. These foods may help control blood pressure.  · Fats.  · Foods containing healthful fats, such as nuts, avocado, olive oil, canola oil, and fish.  DOES EVERYONE WITH DIABETES MELLITUS HAVE THE SAME MEAL PLAN?  Because every person with diabetes mellitus is different, there is not one meal plan that works for everyone. It is very important that you meet with a dietitian who will help you create a meal plan that is just right for you.     This information is not intended to replace advice given to you by your health care provider. Make sure you discuss any questions you have with your health care provider.     Document Released: 09/14/2006 Document Revised: 01/08/2016 Document Reviewed: 11/14/2014  [employer] Patient Education  ©2016 [employer]    Your medical care was provided today by: GELY Treviño    Thank You for the opportunity to serve you.    You may receive a brief survey in the mail shortly regarding your visit today. Please take a few moments to complete the survey and return it; no postage is necessary. We are working to serve our patient population better, improve customer service and our patients overall experience and your input can help us to accomplish this. We thank you for your help and for the opportunity to serve you today and in the future.     Special Instructions:  Always call 9-1-1 immediately if you develop a life threatening emergency.    Unless told otherwise please take all medications as directed and complete prescription therapies.     Watch for the following signs that require additional evaluation: progressive lethargy or unresponsiveness, localized pain (chest, abdomen), shortness of breath, painful breathing, progressive vomiting with weakness, bloody stools, or new rash.     If you are prescribed pain medication or any other medication that is sedating, do not take medication before or while operating a vehicle or heavy machinery or equipment due to potential side effects such as drowsiness and/or dizziness.

## 2017-04-20 NOTE — ASSESSMENT & PLAN NOTE
Patient reports that she is often stressed at home, she'll start her . She currently is taking care of her grandchildren, her son was high on drugs.

## 2017-04-20 NOTE — MR AVS SNAPSHOT
"        Denisse Ramirez   2017 10:40 AM   Office Visit   MRN: [id number]    Department:  [facility]   Dept Phone:  [phone]    Description:  Female : 1960   Provider:  SPENSER Acevedo           Reason for Visit     Other positive UDS      Allergies as of 2017     Allergen Noted Reactions    Tetanus Toxoids 2010         You were diagnosed with     Prediabetes   [754362]       Mild hyperlipidemia   [679770]       Abnormal drug screen   [792159]       Morbid obesity with BMI of 40.0-44.9, adult (CMS-[facility])   [781847]       Stress at home   [980405]       Screening for colon cancer   [404415]       Screening for breast cancer   [125967]       Vaccine refused by patient   [953719]       Need for vaccination   [680295]         Vital Signs     Blood Pressure Pulse Temperature Respirations Height Weight    140/90 mmHg 99 36.9 °C (98.4 °F) 18 1.6 m (5' 3\") 114.306 kg (252 lb)    Body Mass Index Oxygen Saturation Last Menstrual Period Smoking Status          44.65 kg/m2 99% 2009 Current Every Day Smoker        Basic Information     Date Of Birth Sex Race Ethnicity Preferred Language    1960 Female White Non- English      Your appointments     2017  1:00 PM   Established Patient with SPENSER Acevedo   [facility] (--)    [address] [place] [id number]   [phone]           You will be receiving a confirmation call a few days before your appointment from our automated call confirmation system.              Problem List              ICD-10-CM Priority Class Noted - Resolved    Bilateral low back pain with right-sided sciatica M54.41   3/4/2016 - Present    Anxiety F41.9   3/4/2016 - Present    Moderate episode of recurrent major depressive disorder (CMS-[facility]) F33.1   5/3/2016 - Present    Insomnia G47.00   5/3/2016 - Present    Notalgia M54.9   5/3/2016 - Present    Lesion of ala of nose L98.9   " 6/3/2016 - Present    Pain of both hip joints M25.551, M25.552   2/1/2017 - Present    Chronic right shoulder pain M25.511, G89.29   2/1/2017 - Present    Morbid obesity with BMI of 40.0-44.9, adult ([facility]) E66.01, Z68.41   3/30/2017 - Present    Tobacco abuse Z72.0   3/30/2017 - Present    Rash and nonspecific skin eruption R21   3/30/2017 - Present    Prediabetes R73.03   4/20/2017 - Present    Mild hyperlipidemia E78.5   4/20/2017 - Present    Abnormal drug screen R89.2   4/20/2017 - Present    Stress at home F43.9   4/20/2017 - Present      Health Maintenance        Date Due Completion Dates    IMM DTaP/Tdap/Td Vaccine (1 - Tdap) 10/29/1979 ---    IMM PNEUMOCOCCAL 19-64 (ADULT) MEDIUM RISK SERIES (1 of 1 - PPSV23) 10/29/1979 ---    PAP SMEAR 10/29/1981 ---    MAMMOGRAM 10/29/2000 ---    COLONOSCOPY 10/29/2010 ---            Current Immunizations     Pneumococcal polysaccharide vaccine (PPSV-23)  Incomplete      Below and/or attached are the medications your provider expects you to take. Review all of your home medications and newly ordered medications with your provider and/or pharmacist. Follow medication instructions as directed by your provider and/or pharmacist. Please keep your medication list with you and share with your provider. Update the information when medications are discontinued, doses are changed, or new medications (including over-the-counter products) are added; and carry medication information at all times in the event of emergency situations     Allergies:  TETANUS TOXOIDS - (reactions not documented)               Medications  Valid as of: April 20, 2017 - 11:25 AM    Generic Name Brand Name Tablet Size Instructions for use    Albuterol Sulfate (Aero Soln) albuterol 108 (90 BASE) MCG/ACT Inhale 2 Puffs by mouth every 6 hours as needed for Shortness of Breath.        Amitriptyline HCl (Tab) ELAVIL 50 MG Take 2 Tabs by mouth at bedtime as needed.        Cetirizine HCl (Tab) ZYRTEC 10 MG TAKE ONE  TABLET BY MOUTH ONE TIME A DAY        Citalopram Hydrobromide (Tab) CELEXA 40 MG Take 1 Tab by mouth every day.        Esomeprazole Magnesium (Pack) Esomeprazole Magnesium 20 MG Take 20 mg by mouth every day.        HydrOXYzine HCl (Tab) ATARAX 50 MG Take 1 Tab by mouth 3 times a day as needed for Itching or Anxiety.        Lisinopril-Hydrochlorothiazide (Tab) PRINZIDE, ZESTORETIC 20-12.5 MG Take 1 Tab by mouth every day.        Meloxicam (Tab) MOBIC 15 MG TAKE ONE TABLET BY MOUTH DAILY        .                 Medicines prescribed today were sent to:     [facility] - [place], [place] - [address] [facility] [facility]    [address] [place] [address]    Phone: [phone] Fax: [phone]    Open 24 Hours?: No      Medication refill instructions:       If your prescription bottle indicates you have medication refills left, it is not necessary to call your provider’s office. Please contact your pharmacy and they will refill your medication.    If your prescription bottle indicates you do not have any refills left, you may request refills at any time through one of the following ways: The online [employer] system (except Urgent Care), by calling your provider’s office, or by asking your pharmacy to contact your provider’s office with a refill request. Medication refills are processed only during regular business hours and may not be available until the next business day. Your provider may request additional information or to have a follow-up visit with you prior to refilling your medication.   *Please Note: Medication refills are assigned a new Rx number when refilled electronically. Your pharmacy may indicate that no refills were authorized even though a new prescription for the same medication is available at the pharmacy. Please request the medicine by name with the pharmacy before contacting your provider for a refill.        Your To Do List     Future Labs/Procedures Complete By Expires    COMP  METABOLIC PANEL  8/20/2017 4/21/2018    HEMOGLOBIN A1C  8/20/2017 4/21/2018    LIPID PROFILE  8/20/2017 4/21/2018    TSH WITH REFLEX TO FT4  8/20/2017 4/20/2018    [id number]  As directed 4/20/2018      Referral     A referral request has been sent to our patient care coordination department. Please allow 3-5 business days for us to process this request and contact you either by phone or mail. If you do not hear from us by the 5th business day, please call us at [phone].        Instructions    Type 2 Diabetes Mellitus, Adult  Type 2 diabetes mellitus, often simply referred to as type 2 diabetes, is a long-lasting (chronic) disease. In type 2 diabetes, the pancreas does not make enough insulin (a hormone), the cells are less responsive to the insulin that is made (insulin resistance), or both. Normally, insulin moves sugars from food into the tissue cells. The tissue cells use the sugars for energy. The lack of insulin or the lack of normal response to insulin causes excess sugars to build up in the blood instead of going into the tissue cells. As a result, high blood sugar (hyperglycemia) develops. The effect of high sugar (glucose) levels can cause many complications.   Type 2 diabetes was also previously called adult-onset diabetes, but it can occur at any age.     RISK FACTORS   A person is predisposed to developing type 2 diabetes if someone in the family has the disease and also has one or more of the following primary risk factors:  · Weight gain, or being overweight or obese.  · An inactive lifestyle.  · A history of consistently eating high-calorie foods.  Maintaining a normal weight and regular physical activity can reduce the chance of developing type 2 diabetes.  SYMPTOMS   A person with type 2 diabetes may not show symptoms initially. The symptoms of type 2 diabetes appear slowly. The symptoms include:  · Increased thirst (polydipsia).  · Increased urination (polyuria).  · Increased  urination during the night (nocturia).  · Sudden or unexplained weight changes.  · Frequent, recurring infections.  · Tiredness (fatigue).  · Weakness.  · Vision changes, such as blurred vision.  · Fruity smell to your breath.  · Abdominal pain.  · Nausea or vomiting.  · Cuts or bruises which are slow to heal.  · Tingling or numbness in the hands or feet.  DIAGNOSIS  Type 2 diabetes is frequently not diagnosed until complications of diabetes are present. Type 2 diabetes is diagnosed when symptoms or complications are present and when blood glucose levels are increased. Your blood glucose level may be checked by one or more of the following blood tests:  · A fasting blood glucose test. You will not be allowed to eat for at least 8 hours before a blood sample is taken.  · A random blood glucose test. Your blood glucose is checked at any time of the day regardless of when you ate.  · A hemoglobin A1c blood glucose test. A hemoglobin A1c test provides information about blood glucose control over the previous 3 months.  · An oral glucose tolerance test (OGTT). Your blood glucose is measured after you have not eaten (fasted) for 2 hours and then after you drink a glucose-containing beverage.  TREATMENT   · You may need to take insulin or diabetes medicine daily to keep blood glucose levels in the desired range.  · If you use insulin, you may need to adjust the dosage depending on the carbohydrates that you eat with each meal or snack.  · Lifestyle changes are recommended as part of your treatment. These may include:  ¨ Following an individualized diet plan developed by a nutritionist or dietitian.  ¨ Exercising daily.  Your health care providers will set individualized treatment goals for you based on your age, your medicines, how long you have had diabetes, and any other medical conditions you have. Generally, the goal of treatment is to maintain the following blood glucose levels:  · Before meals (preprandial):   mg/dL.  · After meals (postprandial): below 180 mg/dL.  · A1c: less than 6.5-7%.  HOME CARE INSTRUCTIONS   · Have your hemoglobin A1c level checked twice a year.  · Perform daily blood glucose monitoring as directed by your health care provider.  · Monitor urine ketones when you are ill and as directed by your health care provider.  · Take your diabetes medicine or insulin as directed by your health care provider to maintain your blood glucose levels in the desired range.  ¨ Never run out of diabetes medicine or insulin. It is needed every day.  ¨ If you are using insulin, you may need to adjust the amount of insulin given based on your intake of carbohydrates. Carbohydrates can raise blood glucose levels but need to be included in your diet. Carbohydrates provide vitamins, minerals, and fiber which are an essential part of a healthy diet. Carbohydrates are found in fruits, vegetables, whole grains, dairy products, legumes, and foods containing added sugars.  · Eat healthy foods. You should make an appointment to see a registered dietitian to help you create an eating plan that is right for you.  · Lose weight if you are overweight.  · Carry a medical alert card or wear your medical alert jewelry.  · Carry a 15-gram carbohydrate snack with you at all times to treat low blood glucose (hypoglycemia). Some examples of 15-gram carbohydrate snacks include:  ¨ Glucose tablets, 3 or 4.  ¨ Glucose gel, 15-gram tube.  ¨ Raisins, 2 tablespoons (24 grams).  ¨ Jelly beans, 6.  ¨ Animal crackers, 8.  ¨ Regular pop, 4 ounces (120 mL).  ¨ Gummy treats, 9.  · Recognize hypoglycemia. Hypoglycemia occurs with blood glucose levels of 70 mg/dL and below. The risk for hypoglycemia increases when fasting or skipping meals, during or after intense exercise, and during sleep. Hypoglycemia symptoms can include:  ¨ Tremors or shakes.  ¨ Decreased ability to concentrate.  ¨ Sweating.  ¨ Increased heart rate.  ¨ Headache.  ¨ Dry  mouth.  ¨ Hunger.  ¨ Irritability.  ¨ Anxiety.  ¨ Restless sleep.  ¨ Altered speech or coordination.  ¨ Confusion.  · Treat hypoglycemia promptly. If you are alert and able to safely swallow, follow the 15:15 rule:  ¨ Take 15-20 grams of rapid-acting glucose or carbohydrate. Rapid-acting options include glucose gel, glucose tablets, or 4 ounces (120 mL) of fruit juice, regular soda, or low-fat milk.  ¨ Check your blood glucose level 15 minutes after taking the glucose.  ¨ Take 15-20 grams more of glucose if the repeat blood glucose level is still 70 mg/dL or below.  ¨ Eat a meal or snack within 1 hour once blood glucose levels return to normal.  · Be alert to feeling very thirsty and urinating more frequently than usual, which are early signs of hyperglycemia. An early awareness of hyperglycemia allows for prompt treatment. Treat hyperglycemia as directed by your health care provider.  · Engage in at least 150 minutes of moderate-intensity physical activity a week, spread over at least 3 days of the week or as directed by your health care provider. In addition, you should engage in resistance exercise at least 2 times a week or as directed by your health care provider. Try to spend no more than 90 minutes at one time inactive.  · Adjust your medicine and food intake as needed if you start a new exercise or sport.  · Follow your sick-day plan anytime you are unable to eat or drink as usual.  · Do not use any tobacco products including cigarettes, chewing tobacco, or electronic cigarettes. If you need help quitting, ask your health care provider.  · Limit alcohol intake to no more than 1 drink per day for nonpregnant women and 2 drinks per day for men. You should drink alcohol only when you are also eating food. Talk with your health care provider whether alcohol is safe for you. Tell your health care provider if you drink alcohol several times a week.  · Keep all follow-up visits as directed by your health care  provider. This is important.  · Schedule an eye exam soon after the diagnosis of type 2 diabetes and then annually.  · Perform daily skin and foot care. Examine your skin and feet daily for cuts, bruises, redness, nail problems, bleeding, blisters, or sores. A foot exam by a health care provider should be done annually.  · Brush your teeth and gums at least twice a day and floss at least once a day. Follow up with your dentist regularly.  · Share your diabetes management plan with your workplace or school.  · Keep your immunizations up to date. It is recommended that you receive a flu (influenza) vaccine every year. It is also recommended that you receive a pneumonia (pneumococcal) vaccine. If you are 65 years of age or older and have never received a pneumonia vaccine, this vaccine may be given as a series of two separate shots. Ask your health care provider which additional vaccines may be recommended.  · Learn to manage stress.  · Obtain ongoing diabetes education and support as needed.  · Participate in or seek rehabilitation as needed to maintain or improve independence and quality of life. Request a physical or occupational therapy referral if you are having foot or hand numbness, or difficulties with grooming, dressing, eating, or physical activity.  SEEK MEDICAL CARE IF:   · You are unable to eat food or drink fluids for more than 6 hours.  · You have nausea and vomiting for more than 6 hours.  · Your blood glucose level is over 240 mg/dL.  · There is a change in mental status.  · You develop an additional serious illness.  · You have diarrhea for more than 6 hours.  · You have been sick or have had a fever for a couple of days and are not getting better.  · You have pain during any physical activity.    SEEK IMMEDIATE MEDICAL CARE IF:  · You have difficulty breathing.  · You have moderate to large ketone levels.  MAKE SURE YOU:  · Understand these instructions.  · Will watch your condition.  · Will get help  right away if you are not doing well or get worse.     This information is not intended to replace advice given to you by your health care provider. Make sure you discuss any questions you have with your health care provider.     Document Released: 12/18/2006 Document Revised: 05/03/2016 Document Reviewed: 07/16/2013  [employer] Interactive Patient Education ©2016 [employer]  Diabetes, Type 2, Am I At Risk?  Diabetes is a lasting (chronic) disease. In type 2 diabetes, the pancreas does not make enough insulin, and the body does not respond normally to the insulin that is made. This type of diabetes was also previously called adult onset diabetes. About 90% of all those who have diabetes have type 2. It usually occurs after the age of 40, but can occur at any age.   People develop type 2 diabetes because they do not use insulin properly. Eventually, the pancreas cannot make enough insulin for the body's needs. Over time, the amount of glucose (sugar) in the blood increases.  RISK FACTORS  · Overweight  the more weight you have, the more resistant your cells become to insulin.  · Family history  you are more likely to get diabetes if a parent or sibling has diabetes.  · Race certain races get diabetes more.  ·  Americans.  · American Indians.  ·  Americans.  · Hispanics.  · .  · Inactive exercise helps control weight and helps your cells be more sensitive to insulin.  · Gestational diabetes  some women develop diabetes while they are pregnant. This goes away when they deliver. However, they are 50-60% more likely to develop type 2 diabetes at a later time.  · Having a baby over 9 pounds  a sign that you may have had gestational diabetes.  · Age the risk of diabetes goes up as you get older, especially after age 45.  · High blood pressure (hypertension).  SYMPTOMS  Many people have no signs or symptoms. Symptoms can be so mild that you might not even notice them. Some of these signs  are:  · Increased thirst.  · Increased hunger.  · Tiredness (fatigue).  · Increased urination, especially at night.  · Weight loss.  · Blurred vision.  · Sores that do not heal.  WHO SHOULD BE TESTED?  · Anyone 45 years or older, especially if overweight, should consider getting tested.  · If you are younger than 45, overweight, and have one or more of the risk factors, you should consider getting tested.  DIAGNOSIS  · Fasting blood glucose (FBS). Usually, 2 are done.  · -125 mg/dl is considered pre-diabetes.  ·  mg/dl or greater is considered diabetes.  · 2 hour Oral Glucose Tolerance Test (OGTT). This test is preformed by first having you not eat or drink for several hours. You are then given something sweet to drink and your blood glucose is measured fasting, at one hour and 2 hours. This test tells how well you are able to handle sugars or carbohydrates.  · Fastin-100 mg/dl.  · 1 hour: less than 200 mg/dl.  · 2 hours: less than 140 mg/dl.  · A1c A1c is a blood glucose test that gives and average of your blood glucose over 3 months. It is the accepted method to use to diagnose diabetes.  · A1c 5.7-6.4% is considered pre-diabetes.  · A1c 6.5% or greater is considered diabetes.  WHAT DOES IT MEAN TO HAVE PRE-DIABETES?  Pre-diabetes means you are at risk for getting type 2 diabetes. Your blood glucose is higher than normal, but not yet high enough to diagnose diabetes. The good news is, if you have pre-diabetes you can reduce the risk of getting diabetes and even return to normal blood glucose levels. With modest weight loss and moderate physical activity, you can delay or prevent type 2 diabetes.   PREVENTION  You cannot do anything about race, age or family history, but you can lower your chances of getting diabetes. You can:   · Exercise regularly and be active.  · Reduce fat and calorie intake.  · Make wise food choices as much as you can.  · Reduce your intake of salt and alcohol.  · Maintain  a reasonable weight.  · Keep blood pressure in an acceptable range. Take medication if needed.  · Not smoke.  · Maintain an acceptable cholesterol level (HDL, LDL, Triglycerides). Take medication if needed.  DOING MY PART: GETTING STARTED  Making big changes in your life is hard, especially if you are faced with more than one change. You can make it easier by taking these steps:  · Make a plan to change behavior.  · Decide exactly what you will do and when you will do it.  · Plan what you need to get ready.  · Think about what might prevent you from reaching your goals.  · Find family and friends who will support and encourage you.  · Decide how you will reward yourself when you do what you have planned.  · Your doctor, dietitian, or counselor can help you make a plan.  HERE ARE SOME OF THE AREAS YOU MAY WISH TO CHANGE TO REDUCE YOUR RISK OF DIABETES.  If you are overweight or obese, choose sensible ways to get in shape. Even small amounts of weight loss, like 5-10 pounds, can help reduce the effects of insulin resistance and help blood glucose control.  Diet  · Avoid crash diets. Instead, eat less of the foods you usually have. Limit the amount of fat you eat.  · Increase your physical activity. Aim for at least 30 minutes of exercise most days of the week.  · Set a reasonable weight-loss goal, such as losing 1 pound a week. Aim for a long-term goal of losing 5-7% of your total body weight.  · Make wise food choices most of the time.  · What you eat has a big impact on your health. By making lopez food choices, you can help control your body weight, blood pressure, and cholesterol.  · Take a hard look at the serving sizes of the foods you eat. Reduce serving sizes of meat, desserts, and foods high in fat. Increase your intake of fruits and vegetables.  · Limit your fat intake to about 25% of your total calories. For example, if your food choices add up to about 2,000 calories a day, try to eat no more than 56 grams  of fat. Your caregiver or a dietitian can help you figure out how much fat to have. You can check food labels for fat content too.  · You may also want to reduce the number of calories you have each day.  · Keep a food log. Write down what you eat, how much you eat, and anything else that helps keep you on track.  · When you meet your goal, reward yourself with a nonfood item or activity.  Exercise  · Be physically active every day.  · Keep and exercise log. Write down what exercise you did, for how long, and anything else that keeps you on track.  · Regular exercise (like brisk walking) tackles several risk factors at once. It helps you lose weight, it keeps your cholesterol and blood pressure under control, and it helps your body use insulin. People who are physically active for 30 minutes a day, 5 days a week, reduced their risk of type 2 diabetes. If you are not very active, you should start slowly at first. Talk with your caregiver first about what kinds of exercise would be safe for you. Make a plan to increase your activity level with the goal of being active for at least 30 minutes a day, most days of the week.  · Choose activities you enjoy. Here are some ways to work extra activity into your daily routine:  · Take the stairs rather than an elevator or escalator.  · Park at the far end of the lot and walk.  · Get off the bus a few stops early and walk the rest of the way.  · Walk or bicycle instead of drive whenever you can.  Medications  Some people need medication to help control their blood pressure or cholesterol levels. If you do, take your medicines as directed. Ask your caregiver whether there are any medicines you can take to prevent type 2 diabetes.  Document Released: 12/20/2004 Document Revised: 03/11/2013 Document Reviewed: 09/15/2010  ExitCare® Patient Information ©2014 [employer].  Diabetes Mellitus and Food  It is important for you to manage your blood sugar (glucose) level. Your blood  glucose level can be greatly affected by what you eat. Eating healthier foods in the appropriate amounts throughout the day at about the same time each day will help you control your blood glucose level. It can also help slow or prevent worsening of your diabetes mellitus. Healthy eating may even help you improve the level of your blood pressure and reach or maintain a healthy weight.   General recommendations for healthful eating and cooking habits include:  · Eating meals and snacks regularly. Avoid going long periods of time without eating to lose weight.  · Eating a diet that consists mainly of plant-based foods, such as fruits, vegetables, nuts, legumes, and whole grains.  · Using low-heat cooking methods, such as baking, instead of high-heat cooking methods, such as deep frying.  Work with your dietitian to make sure you understand how to use the Nutrition Facts information on food labels.  HOW CAN FOOD AFFECT ME?  Carbohydrates  Carbohydrates affect your blood glucose level more than any other type of food. Your dietitian will help you determine how many carbohydrates to eat at each meal and teach you how to count carbohydrates. Counting carbohydrates is important to keep your blood glucose at a healthy level, especially if you are using insulin or taking certain medicines for diabetes mellitus.  Alcohol  Alcohol can cause sudden decreases in blood glucose (hypoglycemia), especially if you use insulin or take certain medicines for diabetes mellitus. Hypoglycemia can be a life-threatening condition. Symptoms of hypoglycemia (sleepiness, dizziness, and disorientation) are similar to symptoms of having too much alcohol.   If your health care provider has given you approval to drink alcohol, do so in moderation and use the following guidelines:  · Women should not have more than one drink per day, and men should not have more than two drinks per day. One drink is equal to:  · 12 oz of beer.  · 5 oz of wine.  · 1½  oz of hard liquor.  · Do not drink on an empty stomach.  · Keep yourself hydrated. Have water, diet soda, or unsweetened iced tea.  · Regular soda, juice, and other mixers might contain a lot of carbohydrates and should be counted.  WHAT FOODS ARE NOT RECOMMENDED?  As you make food choices, it is important to remember that all foods are not the same. Some foods have fewer nutrients per serving than other foods, even though they might have the same number of calories or carbohydrates. It is difficult to get your body what it needs when you eat foods with fewer nutrients. Examples of foods that you should avoid that are high in calories and carbohydrates but low in nutrients include:  · Trans fats (most processed foods list trans fats on the Nutrition Facts label).  · Regular soda.  · Juice.  · Candy.  · Sweets, such as cake, pie, doughnuts, and cookies.  · Fried foods.  WHAT FOODS CAN I EAT?  Eat nutrient-rich foods, which will nourish your body and keep you healthy. The food you should eat also will depend on several factors, including:  · The calories you need.  · The medicines you take.  · Your weight.  · Your blood glucose level.  · Your blood pressure level.  · Your cholesterol level.  You should eat a variety of foods, including:  · Protein.  · Lean cuts of meat.  · Proteins low in saturated fats, such as fish, egg whites, and beans. Avoid processed meats.  · Fruits and vegetables.  · Fruits and vegetables that may help control blood glucose levels, such as apples, mangoes, and yams.  · Dairy products.  · Choose fat-free or low-fat dairy products, such as milk, yogurt, and cheese.  · Grains, bread, pasta, and rice.  · Choose whole grain products, such as multigrain bread, whole oats, and brown rice. These foods may help control blood pressure.  · Fats.  · Foods containing healthful fats, such as nuts, avocado, olive oil, canola oil, and fish.  DOES EVERYONE WITH DIABETES MELLITUS HAVE THE SAME MEAL  PLAN?  Because every person with diabetes mellitus is different, there is not one meal plan that works for everyone. It is very important that you meet with a dietitian who will help you create a meal plan that is just right for you.     This information is not intended to replace advice given to you by your health care provider. Make sure you discuss any questions you have with your health care provider.     Document Released: 09/14/2006 Document Revised: 01/08/2016 Document Reviewed: 11/14/2014  [employer] Interactive Patient Education ©2016 [employer]    Your medical care was provided today by: GELY Treviño    Thank You for the opportunity to serve you.    You may receive a brief survey in the mail shortly regarding your visit today. Please take a few moments to complete the survey and return it; no postage is necessary. We are working to serve our patient population better, improve customer service and our patients overall experience and your input can help us to accomplish this. We thank you for your help and for the opportunity to serve you today and in the future.     Special Instructions:  Always call 9-1-1 immediately if you develop a life threatening emergency.    Unless told otherwise please take all medications as directed and complete prescription therapies.     Watch for the following signs that require additional evaluation: progressive lethargy or unresponsiveness, localized pain (chest, abdomen), shortness of breath, painful breathing, progressive vomiting with weakness, bloody stools, or new rash.     If you are prescribed pain medication or any other medication that is sedating, do not take medication before or while operating a vehicle or heavy machinery or equipment due to potential side effects such as drowsiness and/or dizziness.             [employer] Access Code: Activation code not generated  Current [employer] Status: Active          Quit Tobacco Information     Do you want to quit  using tobacco?    Quitting tobacco decreases risks of cancer, heart and lung disease, increases life expectancy, improves sense of taste and smell, and increases spending money, among other benefits.    If you are thinking about quitting, we can help.  • [facility] Quit Tobacco Program: 873.974.4703  o Program occurs weekly for four weeks and includes pharmacist consultation on products to support quitting smoking or chewing tobacco. A provider referral is needed for pharmacist consultation.  • Tobacco Users Help Hotline: 4-732-QUIT-NOW (566-1921) or https://[place].quitlogix.org/  o Free, confidential telephone and online coaching for [place] residents. Sessions are designed on a schedule that is convenient for you. Eligible clients receive free nicotine replacement therapy.  • Nationally: www.smokefree.gov  o Information and professional assistance to support both immediate and long-term needs as you become, and remain, a non-smoker. Smokefree.gov allows you to choose the help that best fits your needs.

## 2017-04-20 NOTE — ASSESSMENT & PLAN NOTE
Patient was previously prescribed daily Norco for pain.   Abnormal UDS 3/3/2017  Positive for Oxy  Patient reports she may have taken her husbands medication  Discussed at length we can no longer prescribed Opiates for pain  Patient has medication available to wean off medication safely

## 2017-05-23 RX ORDER — HYDROXYZINE 50 MG/1
50 TABLET, FILM COATED ORAL 3 TIMES DAILY PRN
Qty: 90 TAB | Refills: 1 | Status: SHIPPED | OUTPATIENT
Start: 2017-05-23 | End: 2017-07-27 | Stop reason: SDUPTHER

## 2017-05-23 RX ORDER — CETIRIZINE HYDROCHLORIDE 10 MG/1
TABLET ORAL
Qty: 90 TAB | Refills: 1 | Status: SHIPPED | OUTPATIENT
Start: 2017-05-23 | End: 2017-11-15 | Stop reason: SDUPTHER

## 2017-05-25 DIAGNOSIS — R06.02 SOB (SHORTNESS OF BREATH): ICD-10-CM

## 2017-05-25 DIAGNOSIS — F41.9 ANXIETY: ICD-10-CM

## 2017-05-25 RX ORDER — ALBUTEROL SULFATE 90 UG/1
2 AEROSOL, METERED RESPIRATORY (INHALATION) EVERY 6 HOURS PRN
Qty: 8.5 G | Refills: 2 | Status: SHIPPED | OUTPATIENT
Start: 2017-05-25 | End: 2017-09-28 | Stop reason: SDUPTHER

## 2017-05-25 RX ORDER — ESOMEPRAZOLE MAGNESIUM 20 MG/1
20 GRANULE, DELAYED RELEASE ORAL DAILY
Qty: 90 EACH | Refills: 0 | Status: SHIPPED | OUTPATIENT
Start: 2017-05-25 | End: 2017-08-21 | Stop reason: SDUPTHER

## 2017-05-25 RX ORDER — CITALOPRAM 40 MG/1
40 TABLET ORAL DAILY
Qty: 30 TAB | Refills: 11 | Status: SHIPPED | OUTPATIENT
Start: 2017-05-25 | End: 2018-04-10 | Stop reason: SDUPTHER

## 2017-05-26 ENCOUNTER — PATIENT OUTREACH (OUTPATIENT)
Dept: HEALTH INFORMATION MANAGEMENT | Facility: OTHER | Age: 57
End: 2017-05-26

## 2017-06-27 ENCOUNTER — OFFICE VISIT (OUTPATIENT)
Dept: MEDICAL GROUP | Facility: CLINIC | Age: 57
End: 2017-06-27
Payer: MEDICAID

## 2017-06-27 ENCOUNTER — HOSPITAL ENCOUNTER (OUTPATIENT)
Facility: MEDICAL CENTER | Age: 57
End: 2017-06-27
Attending: NURSE PRACTITIONER
Payer: MEDICAID

## 2017-06-27 VITALS
SYSTOLIC BLOOD PRESSURE: 124 MMHG | OXYGEN SATURATION: 97 % | RESPIRATION RATE: 18 BRPM | HEIGHT: 63 IN | WEIGHT: 255 LBS | HEART RATE: 98 BPM | DIASTOLIC BLOOD PRESSURE: 80 MMHG | BODY MASS INDEX: 45.18 KG/M2 | TEMPERATURE: 98.9 F

## 2017-06-27 DIAGNOSIS — Z12.39 SCREENING FOR BREAST CANCER: ICD-10-CM

## 2017-06-27 DIAGNOSIS — R10.2 VAGINAL PAIN: ICD-10-CM

## 2017-06-27 DIAGNOSIS — Z12.11 SCREENING FOR COLON CANCER: ICD-10-CM

## 2017-06-27 DIAGNOSIS — Z01.419 WELL WOMAN EXAM WITH ROUTINE GYNECOLOGICAL EXAM: ICD-10-CM

## 2017-06-27 DIAGNOSIS — Z28.20 VACCINE REFUSED BY PATIENT: ICD-10-CM

## 2017-06-27 PROCEDURE — 99000 SPECIMEN HANDLING OFFICE-LAB: CPT | Performed by: NURSE PRACTITIONER

## 2017-06-27 PROCEDURE — 87624 HPV HI-RISK TYP POOLED RSLT: CPT

## 2017-06-27 PROCEDURE — 87491 CHLMYD TRACH DNA AMP PROBE: CPT

## 2017-06-27 PROCEDURE — 87591 N.GONORRHOEAE DNA AMP PROB: CPT

## 2017-06-27 PROCEDURE — 88175 CYTOPATH C/V AUTO FLUID REDO: CPT

## 2017-06-27 PROCEDURE — 99396 PREV VISIT EST AGE 40-64: CPT | Performed by: NURSE PRACTITIONER

## 2017-06-27 RX ORDER — HYDROCODONE BITARTRATE AND ACETAMINOPHEN 10; 325 MG/1; MG/1
1-2 TABLET ORAL EVERY 6 HOURS PRN
COMMUNITY
End: 2022-11-11

## 2017-06-27 ASSESSMENT — PAIN SCALES - GENERAL: PAINLEVEL: NO PAIN

## 2017-06-27 NOTE — ASSESSMENT & PLAN NOTE
This is a new problem. Patient reports she has not had intercourse in over 30 years. She does report she sometimes feels 'lumps' in her vagina and when she [penatrates with her fingers, she has vaginal pain. Denies any vaginal bleeding or discharge. She has had two children vaginally without apparent complications. She does have a history of UTIs. No concerning symptoms today.

## 2017-06-27 NOTE — PATIENT INSTRUCTIONS
I have placed a referral for GYN at your appointment today.   You should receive a phone call or letter from [facility] stating your Referral has been completed. At that time, please contact the office you have been referred to in order to make an appointment.   If you do not hear from our office within 1-2 weeks, please contact [facility] at [phone].      Pap Test  A Pap test checks the cells on the surface of your cervix. Your doctor will look for cell changes that are not normal, an infection, or cancer. If the cells no longer look normal, it is called dysplasia. Dysplasia can turn into cancer. Regular Pap tests are important to stop cancer from developing.  BEFORE THE PROCEDURE  · Ask your doctor when to schedule your Pap test. Timing the test around your period may be important.  · Do not douche or have sex (intercourse) for 24 hours before the test.  · Do not put creams on your vagina or use tampons for 24 hours before the test.  · Go pee (urinate) just before the test.  PROCEDURE  · You will lie on an exam table with your feet in stirrups.  · A warm metal or plastic tool (speculum) will be put in your vagina to open it up.  · Your doctor will use a small, plastic brush or wooden spatula to take cells from your cervix.  · The cells will be put in a lab container.  · The cells will be checked under a microscope to see if they are normal or not.  AFTER THE PROCEDURE  Get your test results. If they are abnormal, you may need more tests.  Document Released: 01/20/2012 Document Revised: 03/11/2013 Document Reviewed: 12/13/2012  [employer]Care® Patient Information ©2014 [employer]    Your medical care was provided today by: GELY Treviño    Thank You for the opportunity to serve you.    You may receive a brief survey in the mail shortly regarding your visit today. Please take a few moments to complete the survey and return it; no postage is necessary. We are working to serve our patient population better,  improve customer service and our patients overall experience and your input can help us to accomplish this. We thank you for your help and for the opportunity to serve you today and in the future.     Special Instructions:  Always call 9-1-1 immediately if you develop a life threatening emergency.    Unless told otherwise please take all medications as directed and complete prescription therapies.     Watch for the following signs that require additional evaluation: progressive lethargy or unresponsiveness, localized pain (chest, abdomen), shortness of breath, painful breathing, progressive vomiting with weakness, bloody stools, or new rash.     If you are prescribed pain medication or any other medication that is sedating, do not take medication before or while operating a vehicle or heavy machinery or equipment due to potential side effects such as drowsiness and/or dizziness.

## 2017-06-27 NOTE — PROGRESS NOTES
CC:  Pap/Well Woman Exam    HPI:     Denisse Ramirez is 56 y.o. female presenting today for well woman exam with gynecological exam and Pap smear.   She reports her periods are absent, since 2013.  She is currently using  nothing for birth control. She reports 0 sexual partners in the last 30 years.     Vaginal pain  This is a new problem. Patient reports she has not had intercourse in over 30 years. She does report she sometimes feels 'lumps' in her vagina and when she [penatrates with her fingers, she has vaginal pain. Denies any vaginal bleeding or discharge. She has had two children vaginally without apparent complications. She does have a history of UTIs. No concerning symptoms today.         She  has a past medical history of Hypertension; Anxiety; Depression; Insomnia; and Deep vein blood clot of right lower extremity (CMS-HCC).     She  has past surgical history that includes tonsillectomy and abdominal exploration.     History   Sexual Activity   • Sexual Activity: Not on file       Outpatient Encounter Prescriptions as of 6/27/2017   Medication Sig Dispense Refill   • hydrocodone/acetaminophen (NORCO)  MG Tab Take 1-2 Tabs by mouth every 6 hours as needed.     • amitriptyline (ELAVIL) 50 MG Tab Take 2 Tabs by mouth at bedtime as needed. 60 Tab 2   • citalopram (CELEXA) 40 MG Tab Take 1 Tab by mouth every day. 30 Tab 11   • albuterol 108 (90 BASE) MCG/ACT Aero Soln inhalation aerosol Inhale 2 Puffs by mouth every 6 hours as needed for Shortness of Breath. 8.5 g 2   • Esomeprazole Magnesium (NEXIUM) 20 MG Pack Take 20 mg by mouth every day. 90 Each 0   • hydrOXYzine (ATARAX) 50 MG Tab Take 1 Tab by mouth 3 times a day as needed for Itching or Anxiety. 90 Tab 1   • cetirizine (ZYRTEC) 10 MG Tab TAKE ONE TABLET BY MOUTH ONE TIME A DAY 90 Tab 1   • lisinopril-hydrochlorothiazide (PRINZIDE, ZESTORETIC) 20-12.5 MG per tablet Take 1 Tab by mouth every day. 30 Tab 11   • meloxicam (MOBIC) 15 MG tablet  TAKE ONE TABLET BY MOUTH DAILY 30 Tab 1     No facility-administered encounter medications on file as of 6/27/2017.       Patient Active Problem List    Diagnosis Date Noted   • Vaginal pain 06/27/2017   • Prediabetes 04/20/2017   • Mild hyperlipidemia 04/20/2017   • Abnormal drug screen 04/20/2017   • Stress at home 04/20/2017   • Morbid obesity with BMI of 40.0-44.9, adult (formerly [facility]) 03/30/2017   • Tobacco abuse 03/30/2017   • Rash and nonspecific skin eruption 03/30/2017   • Pain of both hip joints 02/01/2017   • Chronic right shoulder pain 02/01/2017   • Lesion of ala of nose 06/03/2016   • Moderate episode of recurrent major depressive disorder (CMS-formerly [facility]) 05/03/2016   • Insomnia 05/03/2016   • Notalgia 05/03/2016   • Bilateral low back pain with right-sided sciatica 03/04/2016   • Anxiety 03/04/2016       .  Social History     Social History   • Marital Status:      Spouse Name: N/A   • Number of Children: N/A   • Years of Education: N/A     Occupational History   • Not on file.     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 No significant dysmenorrhea, concerning vaginal discharge or irritation, no dyspareunia or postcoital bleeding. Denies h/o migraine with aura. Denies musculoskeletal, neurological, or psychiatric problems.    All other systems  "reviewed and are negative.       /80 mmHg  Pulse 98  Temp(Src) 37.2 °C (98.9 °F)  Resp 18  Ht 1.6 m (5' 3\")  Wt 115.667 kg (255 lb)  BMI 45.18 kg/m2  SpO2 97%    Physical Exam:  Kimmy Richards MA present during exam.     GEN:  Appears well and in no apparent distress   NECK:  Supple without adenopathy or thyromegaly  LUNGS:  Clear and equal. No wheeze, ronchi, or rales.  CV:  RRR, S1, S2. No murmur.  Pedal pulses 2+ bilaterally.  BREAST:  Declines, plans to schedule for Mammo.   ABD:  Soft, non-tender, non-distended, normal bowel sounds.  No hepatosplenomegaly.  :  Normal external female genitalia.  Vaginal canal clear.  Cervix appears normal without lesions or polyps. Specimen collected from transformation zone. Bimanual exam:  No CMT, normal size uterus without masses or tenderness; no adnexal masses or tenderness. Appearance of possible bladder prolapse? She does have reported discomfort with speculum insertion.       Assessment and plan:    1. Well woman exam with routine gynecological exam  Will contact with results.   - THINPREP PAP W/HPV AND CTNG; Future    2. Vaginal pain  Based on exam and complaints, advised to see GYN. Possible bladder prolapse.   - REFERRAL TO GYNECOLOGY    3. Screening for colon cancer  Declines colonoscopy.   - OCCULT BLOOD FECES IMMUNOASSAY; Future    4. Screening for breast cancer  She has been referred for Mammo, plans to schedule.     5. Vaccine refused by patient  Reports did have Tdap in [place] 2 years ago. We do not have records.       Reviewed risks and benefits of treatment plan. Patient verbally agrees to plan of care.       F/u pending results  Return if symptoms worsen or fail to improve.    TORI Acevedo.     PLEASE NOTE: This dictation was created using voice recognition software. I have made every reasonable attempt to correct obvious errors, but I expect that there may be errors of grammar and possibly content that I did not discover prior " finalizing this note.

## 2017-06-27 NOTE — MR AVS SNAPSHOT
"        Denisse Ramirez   2017 1:00 PM   Office Visit   MRN: [id number]    [facility] Phone:  [phone]    Description:  Female : 1960   Provider:  SPENSER Acevedo           Reason for Visit     Gynecologic Exam           Allergies as of 2017     Allergen Noted Reactions    Tetanus Toxoids 2010         You were diagnosed with     Well woman exam with routine gynecological exam   [287994]       Vaginal pain   [929060]       Screening for colon cancer   [616621]       Screening for breast cancer   [044821]       Vaccine refused by patient   [[id number]]         Vital Signs     Blood Pressure Pulse Temperature Respirations Height Weight    124/80 mmHg 98 37.2 °C (98.9 °F) 18 1.6 m (5' 3\") 115.667 kg (255 lb)    Body Mass Index Oxygen Saturation Smoking Status             45.18 kg/m2 97% Current Every Day Smoker         Basic Information     Date Of Birth Sex Race Ethnicity Preferred Language    1960 Female White Non- English      Your appointments     2017  8:00 AM   Telemedicine Clinic New Patient with Jere Henning RD, TELEMED HEALTH MANAGMENT, [facility]   Centralized Scheduling (--)    [address]  [place] [id number] [phone]            Oct 19, 2017  1:00 PM   Established Patient with SPENSER Acevedo   [facility] (--)    [address] [id number] [phone]           You will be receiving a confirmation call a few days before your appointment from our automated call confirmation system.              Problem List              ICD-10-CM Priority Class Noted - Resolved    Bilateral low back pain with right-sided sciatica M54.41   3/4/2016 - Present    Anxiety F41.9   3/4/2016 - Present    Moderate episode of recurrent major depressive disorder (CMS-HCC) F33.1   5/3/2016 - Present    Insomnia G47.00   5/3/2016 - Present    Notalgia M54.9   " 5/3/2016 - Present    Lesion of ala of nose L98.9   6/3/2016 - Present    Pain of both hip joints M25.551, M25.552   2/1/2017 - Present    Chronic right shoulder pain M25.511, G89.29   2/1/2017 - Present    Morbid obesity with BMI of 40.0-44.9, adult (HCC) E66.01, Z68.41   3/30/2017 - Present    Tobacco abuse Z72.0   3/30/2017 - Present    Rash and nonspecific skin eruption R21   3/30/2017 - Present    Prediabetes R73.03   4/20/2017 - Present    Mild hyperlipidemia E78.5   4/20/2017 - Present    Abnormal drug screen R89.2   4/20/2017 - Present    Stress at home F43.9   4/20/2017 - Present    Vaginal pain R10.2   6/27/2017 - Present      Health Maintenance        Date Due Completion Dates    COLON CANCER SCREENING ANNUAL FIT 1960 ---    PAP SMEAR 10/29/1981 ---    MAMMOGRAM 10/29/2000 ---    IMM DTaP/Tdap/Td Vaccine (1 - Tdap) 8/1/2018 (Originally 10/29/1979) ---            Current Immunizations     Pneumococcal polysaccharide vaccine (PPSV-23) 4/20/2017      Below and/or attached are the medications your provider expects you to take. Review all of your home medications and newly ordered medications with your provider and/or pharmacist. Follow medication instructions as directed by your provider and/or pharmacist. Please keep your medication list with you and share with your provider. Update the information when medications are discontinued, doses are changed, or new medications (including over-the-counter products) are added; and carry medication information at all times in the event of emergency situations     Allergies:  TETANUS TOXOIDS - (reactions not documented)               Medications  Valid as of: June 27, 2017 -  1:41 PM    Generic Name Brand Name Tablet Size Instructions for use    Albuterol Sulfate (Aero Soln) albuterol 108 (90 BASE) MCG/ACT Inhale 2 Puffs by mouth every 6 hours as needed for Shortness of Breath.        Amitriptyline HCl (Tab) ELAVIL 50 MG Take 2 Tabs by mouth at bedtime as needed.         Cetirizine HCl (Tab) ZYRTEC 10 MG TAKE ONE TABLET BY MOUTH ONE TIME A DAY        Citalopram Hydrobromide (Tab) CELEXA 40 MG Take 1 Tab by mouth every day.        Esomeprazole Magnesium (Pack) Esomeprazole Magnesium 20 MG Take 20 mg by mouth every day.        Hydrocodone-Acetaminophen (Tab) NORCO  MG Take 1-2 Tabs by mouth every 6 hours as needed.        HydrOXYzine HCl (Tab) ATARAX 50 MG Take 1 Tab by mouth 3 times a day as needed for Itching or Anxiety.        Lisinopril-Hydrochlorothiazide (Tab) PRINZIDE, ZESTORETIC 20-12.5 MG Take 1 Tab by mouth every day.        Meloxicam (Tab) MOBIC 15 MG TAKE ONE TABLET BY MOUTH DAILY        .                 Medicines prescribed today were sent to:     [facility] [place] [place] - [address]    [address] [facility] [address]    Phone: [phone] Fax: [phone]    Open 24 Hours?: No      Medication refill instructions:       If your prescription bottle indicates you have medication refills left, it is not necessary to call your provider’s office. Please contact your pharmacy and they will refill your medication.    If your prescription bottle indicates you do not have any refills left, you may request refills at any time through one of the following ways: The online Jotky system (except Urgent Care), by calling your provider’s office, or by asking your pharmacy to contact your provider’s office with a refill request. Medication refills are processed only during regular business hours and may not be available until the next business day. Your provider may request additional information or to have a follow-up visit with you prior to refilling your medication.   *Please Note: Medication refills are assigned a new Rx number when refilled electronically. Your pharmacy may indicate that no refills were authorized even though a new prescription for the same medication is available at the pharmacy. Please request the medicine  by name with the pharmacy before contacting your provider for a refill.        Your To Do List     Future Labs/Procedures Complete By Expires    OCCULT BLOOD FECES IMMUNOASSAY  As directed 6/28/2018    THINPREP PAP W/HPV AND CTNG  As directed 6/28/2018      Referral     A referral request has been sent to our patient care coordination department. Please allow 3-5 business days for us to process this request and contact you either by phone or mail. If you do not hear from us by the 5th business day, please call us at [phone].        Instructions    I have placed a referral for GYN at your appointment today.   You should receive a phone call or letter from [facility] stating your Referral has been completed. At that time, please contact the office you have been referred to in order to make an appointment.   If you do not hear from our office within 1-2 weeks, please contact [facility] at [phone].      Pap Test  A Pap test checks the cells on the surface of your cervix. Your doctor will look for cell changes that are not normal, an infection, or cancer. If the cells no longer look normal, it is called dysplasia. Dysplasia can turn into cancer. Regular Pap tests are important to stop cancer from developing.  BEFORE THE PROCEDURE  · Ask your doctor when to schedule your Pap test. Timing the test around your period may be important.  · Do not douche or have sex (intercourse) for 24 hours before the test.  · Do not put creams on your vagina or use tampons for 24 hours before the test.  · Go pee (urinate) just before the test.  PROCEDURE  · You will lie on an exam table with your feet in stirrups.  · A warm metal or plastic tool (speculum) will be put in your vagina to open it up.  · Your doctor will use a small, plastic brush or wooden spatula to take cells from your cervix.  · The cells will be put in a lab container.  · The cells will be checked under a microscope to see if they are normal or not.  AFTER THE  PROCEDURE  Get your test results. If they are abnormal, you may need more tests.  Document Released: 01/20/2012 Document Revised: 03/11/2013 Document Reviewed: 12/13/2012  ExitCare® Patient Information ©2014 [employer].    Your medical care was provided today by: GELY Treviño    Thank You for the opportunity to serve you.    You may receive a brief survey in the mail shortly regarding your visit today. Please take a few moments to complete the survey and return it; no postage is necessary. We are working to serve our patient population better, improve customer service and our patients overall experience and your input can help us to accomplish this. We thank you for your help and for the opportunity to serve you today and in the future.     Special Instructions:  Always call 9-1-1 immediately if you develop a life threatening emergency.    Unless told otherwise please take all medications as directed and complete prescription therapies.     Watch for the following signs that require additional evaluation: progressive lethargy or unresponsiveness, localized pain (chest, abdomen), shortness of breath, painful breathing, progressive vomiting with weakness, bloody stools, or new rash.     If you are prescribed pain medication or any other medication that is sedating, do not take medication before or while operating a vehicle or heavy machinery or equipment due to potential side effects such as drowsiness and/or dizziness.             [email or web address] Access Code: Activation code not generated  Current [email or web address] Status: Active          Quit Tobacco Information     Do you want to quit using tobacco?    Quitting tobacco decreases risks of cancer, heart and lung disease, increases life expectancy, improves sense of taste and smell, and increases spending money, among other benefits.    If you are thinking about quitting, we can help.  • [facility] Quit Tobacco Program: 965-185-7038  o Program occurs weekly for four weeks and  includes pharmacist consultation on products to support quitting smoking or chewing tobacco. A provider referral is needed for pharmacist consultation.  • Tobacco Users Help Hotline: 0-997-QUIT-NOW (068-1187) or https://[place].quitlogix.org/  o Free, confidential telephone and online coaching for [place] residents. Sessions are designed on a schedule that is convenient for you. Eligible clients receive free nicotine replacement therapy.  • Nationally: www.smokefree.gov  o Information and professional assistance to support both immediate and long-term needs as you become, and remain, a non-smoker. Smokefree.gov allows you to choose the help that best fits your needs.

## 2017-06-29 LAB
C TRACH DNA GENITAL QL NAA+PROBE: NEGATIVE
CYTOLOGY REG CYTOL: NORMAL
HPV HR 12 DNA CVX QL NAA+PROBE: NEGATIVE
HPV16 DNA SPEC QL NAA+PROBE: NEGATIVE
HPV18 DNA SPEC QL NAA+PROBE: NEGATIVE
N GONORRHOEA DNA GENITAL QL NAA+PROBE: NEGATIVE
SPECIMEN SOURCE: NORMAL
SPECIMEN SOURCE: NORMAL

## 2017-06-30 ENCOUNTER — HOSPITAL ENCOUNTER (OUTPATIENT)
Facility: MEDICAL CENTER | Age: 57
End: 2017-06-30
Attending: NURSE PRACTITIONER
Payer: MEDICAID

## 2017-06-30 PROCEDURE — 82274 ASSAY TEST FOR BLOOD FECAL: CPT

## 2017-07-11 DIAGNOSIS — Z12.11 SCREENING FOR COLON CANCER: ICD-10-CM

## 2017-07-12 LAB — HEMOCCULT STL QL IA: NEGATIVE

## 2017-07-22 ENCOUNTER — OFFICE VISIT (OUTPATIENT)
Dept: MEDICAL GROUP | Facility: CLINIC | Age: 57
End: 2017-07-22
Payer: MEDICAID

## 2017-07-22 VITALS
OXYGEN SATURATION: 94 % | TEMPERATURE: 97.5 F | BODY MASS INDEX: 44.3 KG/M2 | DIASTOLIC BLOOD PRESSURE: 100 MMHG | HEIGHT: 63 IN | HEART RATE: 101 BPM | SYSTOLIC BLOOD PRESSURE: 144 MMHG | WEIGHT: 250 LBS

## 2017-07-22 DIAGNOSIS — J01.00 ACUTE NON-RECURRENT MAXILLARY SINUSITIS: ICD-10-CM

## 2017-07-22 PROCEDURE — 99214 OFFICE O/P EST MOD 30 MIN: CPT | Performed by: PHYSICIAN ASSISTANT

## 2017-07-22 RX ORDER — AMOXICILLIN 875 MG/1
875 TABLET, COATED ORAL 2 TIMES DAILY
Qty: 14 TAB | Refills: 0 | Status: SHIPPED | OUTPATIENT
Start: 2017-07-22 | End: 2017-07-29

## 2017-07-22 RX ORDER — BENZONATATE 200 MG/1
200 CAPSULE ORAL 3 TIMES DAILY PRN
Qty: 30 CAP | Refills: 0 | Status: SHIPPED | OUTPATIENT
Start: 2017-07-22 | End: 2018-01-05

## 2017-07-22 ASSESSMENT — ENCOUNTER SYMPTOMS
EYE PAIN: 0
CHILLS: 0
HEADACHES: 1
EYE REDNESS: 0
DIZZINESS: 1
PALPITATIONS: 0
SHORTNESS OF BREATH: 0
SORE THROAT: 1
WHEEZING: 0
FEVER: 0
COUGH: 1
EYE DISCHARGE: 0

## 2017-07-22 NOTE — MR AVS SNAPSHOT
"        Denisse Varghesecaro   2017 11:00 AM   Office Visit   MRN: [id number]    Department:  [facility] Phone:  [phone]    Description:  Female : 1960   Provider:  Nile Boyle PA-C           Reason for Visit     Cough           Allergies as of 2017     Allergen Noted Reactions    Tetanus Toxoids 2010         You were diagnosed with     Acute non-recurrent maxillary sinusitis   [[id number]]         Vital Signs     Blood Pressure Pulse Temperature Height Weight Body Mass Index    144/100 mmHg 101 36.4 °C (97.5 °F) 1.6 m (5' 3\") 113.399 kg (250 lb) 44.30 kg/m2    Oxygen Saturation Smoking Status                94% Current Every Day Smoker          Basic Information     Date Of Birth Sex Race Ethnicity Preferred Language    1960 Female White Non- English      Your appointments     Oct 19, 2017  1:00 PM   Established Patient with SPENSER Acevedo   [facility] (--)    [facility] [id number] [phone]           You will be receiving a confirmation call a few days before your appointment from our automated call confirmation system.              Problem List              ICD-10-CM Priority Class Noted - Resolved    Bilateral low back pain with right-sided sciatica M54.41   3/4/2016 - Present    Anxiety F41.9   3/4/2016 - Present    Moderate episode of recurrent major depressive disorder (CMS-HCC) F33.1   5/3/2016 - Present    Insomnia G47.00   5/3/2016 - Present    Notalgia M54.9   5/3/2016 - Present    Lesion of ala of nose L98.9   6/3/2016 - Present    Pain of both hip joints M25.551, M25.552   2017 - Present    Chronic right shoulder pain M25.511, G89.29   2017 - Present    Morbid obesity with BMI of 40.0-44.9, adult (HCC) E66.01, Z68.41   3/30/2017 - Present    Tobacco abuse Z72.0   3/30/2017 - Present    Rash and nonspecific skin eruption R21   3/30/2017 - Present    Prediabetes R73.03   " 4/20/2017 - Present    Mild hyperlipidemia E78.5   4/20/2017 - Present    Abnormal drug screen R89.2   4/20/2017 - Present    Stress at home F43.9   4/20/2017 - Present    Vaginal pain R10.2   6/27/2017 - Present      Health Maintenance        Date Due Completion Dates    MAMMOGRAM 10/29/2000 ---    IMM DTaP/Tdap/Td Vaccine (1 - Tdap) 8/1/2018 (Originally 10/29/1979) ---    IMM INFLUENZA (1) 9/1/2017 ---    COLON CANCER SCREENING ANNUAL FIT 6/30/2018 6/30/2017    PAP SMEAR 6/27/2020 6/27/2017, 6/27/2017            Current Immunizations     Pneumococcal polysaccharide vaccine (PPSV-23) 4/20/2017      Below and/or attached are the medications your provider expects you to take. Review all of your home medications and newly ordered medications with your provider and/or pharmacist. Follow medication instructions as directed by your provider and/or pharmacist. Please keep your medication list with you and share with your provider. Update the information when medications are discontinued, doses are changed, or new medications (including over-the-counter products) are added; and carry medication information at all times in the event of emergency situations     Allergies:  TETANUS TOXOIDS - (reactions not documented)               Medications  Valid as of: July 22, 2017 - 12:23 PM    Generic Name Brand Name Tablet Size Instructions for use    Albuterol Sulfate (Aero Soln) albuterol 108 (90 BASE) MCG/ACT Inhale 2 Puffs by mouth every 6 hours as needed for Shortness of Breath.        Amitriptyline HCl (Tab) ELAVIL 50 MG Take 2 Tabs by mouth at bedtime as needed.        Amoxicillin (Tab) AMOXIL 875 MG Take 1 Tab by mouth 2 times a day for 7 days.        Benzonatate (Cap) TESSALON 200 MG Take 1 Cap by mouth 3 times a day as needed for Cough.        Cetirizine HCl (Tab) ZYRTEC 10 MG TAKE ONE TABLET BY MOUTH ONE TIME A DAY        Citalopram Hydrobromide (Tab) CELEXA 40 MG Take 1 Tab by mouth every day.        Esomeprazole  Magnesium (Pack) Esomeprazole Magnesium 20 MG Take 20 mg by mouth every day.        Hydrocodone-Acetaminophen (Tab) NORCO  MG Take 1-2 Tabs by mouth every 6 hours as needed.        HydrOXYzine HCl (Tab) ATARAX 50 MG Take 1 Tab by mouth 3 times a day as needed for Itching or Anxiety.        Lisinopril-Hydrochlorothiazide (Tab) PRINZIDE, ZESTORETIC 20-12.5 MG Take 1 Tab by mouth every day.        Meloxicam (Tab) MOBIC 15 MG TAKE ONE TABLET BY MOUTH DAILY        .                 Medicines prescribed today were sent to:     [facility] - [place], [place] - [address]    [address] [facility] [address]    Phone: [phone] Fax: [phone]    Open 24 Hours?: No      Medication refill instructions:       If your prescription bottle indicates you have medication refills left, it is not necessary to call your provider’s office. Please contact your pharmacy and they will refill your medication.    If your prescription bottle indicates you do not have any refills left, you may request refills at any time through one of the following ways: The online What's in My Handbag system (except Urgent Care), by calling your provider’s office, or by asking your pharmacy to contact your provider’s office with a refill request. Medication refills are processed only during regular business hours and may not be available until the next business day. Your provider may request additional information or to have a follow-up visit with you prior to refilling your medication.   *Please Note: Medication refills are assigned a new Rx number when refilled electronically. Your pharmacy may indicate that no refills were authorized even though a new prescription for the same medication is available at the pharmacy. Please request the medicine by name with the pharmacy before contacting your provider for a refill.           What's in My Handbag Access Code: Activation code not generated  Current What's in My Handbag Status: Active          Quit  Tobacco Information     Do you want to quit using tobacco?    Quitting tobacco decreases risks of cancer, heart and lung disease, increases life expectancy, improves sense of taste and smell, and increases spending money, among other benefits.    If you are thinking about quitting, we can help.  • [facility] Quit Tobacco Program: [phone]  o Program occurs weekly for four weeks and includes pharmacist consultation on products to support quitting smoking or chewing tobacco. A provider referral is needed for pharmacist consultation.  • Tobacco Users Help Hotline: 9-670-QUIT-NOW (876-3035) or https://[place].quitlogix.org/  o Free, confidential telephone and online coaching for [place] residents. Sessions are designed on a schedule that is convenient for you. Eligible clients receive free nicotine replacement therapy.  • Nationally: www.smokefree.gov  o Information and professional assistance to support both immediate and long-term needs as you become, and remain, a non-smoker. Smokefree.gov allows you to choose the help that best fits your needs.

## 2017-07-22 NOTE — PROGRESS NOTES
Subjective:      Denisse Ramirez is a 56 y.o. female who presents with Cough            Sinus Problem  Episode onset: 2 weeks ago. The problem has been waxing and waning since onset. There has been no fever. The pain is moderate. Associated symptoms include congestion, coughing, ear pain, headaches and a sore throat. Pertinent negatives include no chills or shortness of breath. Past treatments include oral decongestants. The treatment provided no relief.       Review of Systems   Constitutional: Positive for malaise/fatigue. Negative for fever and chills.   HENT: Positive for congestion, ear pain and sore throat.    Eyes: Negative for pain, discharge and redness.   Respiratory: Positive for cough. Negative for shortness of breath and wheezing.    Cardiovascular: Negative for chest pain and palpitations.   Neurological: Positive for dizziness and headaches.   All other systems reviewed and are negative.    All other systems reviewed and are negative.  PMH:  has a past medical history of Hypertension; Anxiety; Depression; Insomnia; and Deep vein blood clot of right lower extremity (CMS-HCC).  MEDS:   Current outpatient prescriptions:   •  amoxicillin (AMOXIL) 875 MG tablet, Take 1 Tab by mouth 2 times a day for 7 days., Disp: 14 Tab, Rfl: 0  •  benzonatate (TESSALON) 200 MG capsule, Take 1 Cap by mouth 3 times a day as needed for Cough., Disp: 30 Cap, Rfl: 0  •  hydrocodone/acetaminophen (NORCO)  MG Tab, Take 1-2 Tabs by mouth every 6 hours as needed., Disp: , Rfl:   •  amitriptyline (ELAVIL) 50 MG Tab, Take 2 Tabs by mouth at bedtime as needed., Disp: 60 Tab, Rfl: 2  •  citalopram (CELEXA) 40 MG Tab, Take 1 Tab by mouth every day., Disp: 30 Tab, Rfl: 11  •  albuterol 108 (90 BASE) MCG/ACT Aero Soln inhalation aerosol, Inhale 2 Puffs by mouth every 6 hours as needed for Shortness of Breath., Disp: 8.5 g, Rfl: 2  •  Esomeprazole Magnesium (NEXIUM) 20 MG Pack, Take 20 mg by mouth every day., Disp: 90 Each,  "Rfl: 0  •  hydrOXYzine (ATARAX) 50 MG Tab, Take 1 Tab by mouth 3 times a day as needed for Itching or Anxiety., Disp: 90 Tab, Rfl: 1  •  cetirizine (ZYRTEC) 10 MG Tab, TAKE ONE TABLET BY MOUTH ONE TIME A DAY, Disp: 90 Tab, Rfl: 1  •  lisinopril-hydrochlorothiazide (PRINZIDE, ZESTORETIC) 20-12.5 MG per tablet, Take 1 Tab by mouth every day., Disp: 30 Tab, Rfl: 11  •  meloxicam (MOBIC) 15 MG tablet, TAKE ONE TABLET BY MOUTH DAILY, Disp: 30 Tab, Rfl: 1  ALLERGIES:   Allergies   Allergen Reactions   • Tetanus Toxoids      SURGHX:   Past Surgical History   Procedure Laterality Date   • Tonsillectomy     • Abdominal exploration       SOCHX:  reports that she has been smoking Cigarettes.  She has a 7.5 pack-year smoking history. She has never used smokeless tobacco. She reports that she drinks about 3.5 oz of alcohol per week. She reports that she does not use illicit drugs.  FH: Family history was reviewed, no pertinent findings to report  Medications, Allergies, and current problem list reviewed today in Epic         Objective:     /100 mmHg  Pulse 101  Temp(Src) 36.4 °C (97.5 °F)  Ht 1.6 m (5' 3\")  Wt 113.399 kg (250 lb)  BMI 44.30 kg/m2  SpO2 94%     Physical Exam   Constitutional: She is oriented to person, place, and time. Vital signs are normal. She appears well-developed and well-nourished.   HENT:   Head: Normocephalic and atraumatic.   Right Ear: Hearing, tympanic membrane, external ear and ear canal normal.   Left Ear: Hearing, tympanic membrane, external ear and ear canal normal.   Nose: Mucosal edema and rhinorrhea present. No sinus tenderness. No epistaxis. Right sinus exhibits maxillary sinus tenderness. Left sinus exhibits maxillary sinus tenderness.   Mouth/Throat: Uvula is midline, oropharynx is clear and moist and mucous membranes are normal.   Neck: Normal range of motion. Neck supple.   Cardiovascular: Regular rhythm, normal heart sounds and intact distal pulses.    Pulmonary/Chest: Effort " normal and breath sounds normal.   Neurological: She is alert and oriented to person, place, and time.   Skin: Skin is warm and dry.   Psychiatric: She has a normal mood and affect. Her behavior is normal.   Vitals reviewed.              Assessment/Plan:     1. Acute non-recurrent maxillary sinusitis  - amoxicillin 875 bid for 7 days  -cont. OTC decongestants    Differential diagnosis, natural history, supportive care, and indications for immediate follow-up discussed at length.   Follow-up with primary care provider within 4-5 days, emergency room precautions discussed.  Patient and/or family appears understanding of information.

## 2017-07-27 RX ORDER — HYDROXYZINE 50 MG/1
50 TABLET, FILM COATED ORAL 3 TIMES DAILY PRN
Qty: 90 TAB | Refills: 1 | Status: SHIPPED | OUTPATIENT
Start: 2017-07-27 | End: 2017-09-28 | Stop reason: SDUPTHER

## 2017-08-22 RX ORDER — ESOMEPRAZOLE MAGNESIUM 20 MG/1
20 GRANULE, DELAYED RELEASE ORAL DAILY
Qty: 90 EACH | Refills: 1 | Status: SHIPPED | OUTPATIENT
Start: 2017-08-22 | End: 2018-04-09 | Stop reason: SDUPTHER

## 2017-09-21 DIAGNOSIS — F51.01 PRIMARY INSOMNIA: ICD-10-CM

## 2017-09-22 RX ORDER — AMITRIPTYLINE HYDROCHLORIDE 50 MG/1
100 TABLET, FILM COATED ORAL NIGHTLY PRN
Qty: 60 TAB | Refills: 2 | Status: SHIPPED | OUTPATIENT
Start: 2017-09-22 | End: 2017-12-19 | Stop reason: SDUPTHER

## 2017-09-28 DIAGNOSIS — R06.02 SOB (SHORTNESS OF BREATH): ICD-10-CM

## 2017-09-28 RX ORDER — ALBUTEROL SULFATE 90 UG/1
2 AEROSOL, METERED RESPIRATORY (INHALATION) EVERY 6 HOURS PRN
Qty: 8.5 G | Refills: 2 | Status: SHIPPED | OUTPATIENT
Start: 2017-09-28 | End: 2018-04-10 | Stop reason: SDUPTHER

## 2017-09-28 RX ORDER — HYDROXYZINE 50 MG/1
50 TABLET, FILM COATED ORAL 3 TIMES DAILY PRN
Qty: 90 TAB | Refills: 1 | Status: SHIPPED | OUTPATIENT
Start: 2017-09-28 | End: 2018-01-05 | Stop reason: SDUPTHER

## 2017-10-09 ENCOUNTER — TELEPHONE (OUTPATIENT)
Dept: MEDICAL GROUP | Facility: CLINIC | Age: 57
End: 2017-10-09

## 2017-10-09 NOTE — TELEPHONE ENCOUNTER
1. Caller Name: Denisse                      Call Back Number: [phone] (home)     2. Message: [facility]t today at 1pm and insurance will not cover unless she has referral from primary.    3. Patient approves office to leave a detailed voicemail/MyChart message: N\A

## 2017-11-16 RX ORDER — CETIRIZINE HYDROCHLORIDE 10 MG/1
TABLET ORAL
Qty: 90 TAB | Refills: 0 | Status: SHIPPED | OUTPATIENT
Start: 2017-11-16 | End: 2018-01-05 | Stop reason: SDUPTHER

## 2017-11-21 RX ORDER — MELOXICAM 15 MG/1
TABLET ORAL
Qty: 30 TAB | Refills: 1 | Status: SHIPPED | OUTPATIENT
Start: 2017-11-21 | End: 2018-01-25 | Stop reason: SDUPTHER

## 2017-12-19 DIAGNOSIS — F51.01 PRIMARY INSOMNIA: ICD-10-CM

## 2017-12-20 RX ORDER — AMITRIPTYLINE HYDROCHLORIDE 50 MG/1
100 TABLET, FILM COATED ORAL NIGHTLY PRN
Qty: 60 TAB | Refills: 0 | Status: SHIPPED | OUTPATIENT
Start: 2017-12-20 | End: 2018-01-25 | Stop reason: SDUPTHER

## 2017-12-20 NOTE — TELEPHONE ENCOUNTER
Was the patient seen in the last year in this department? Yes - Low man.     Does patient have an active prescription for medications requested? Yes     Received Request Via: Pharmacy

## 2018-01-05 ENCOUNTER — OFFICE VISIT (OUTPATIENT)
Dept: MEDICAL GROUP | Facility: CLINIC | Age: 58
End: 2018-01-05
Payer: MEDICAID

## 2018-01-05 VITALS
DIASTOLIC BLOOD PRESSURE: 78 MMHG | TEMPERATURE: 96.8 F | WEIGHT: 245 LBS | OXYGEN SATURATION: 93 % | RESPIRATION RATE: 20 BRPM | SYSTOLIC BLOOD PRESSURE: 136 MMHG | BODY MASS INDEX: 43.41 KG/M2 | HEIGHT: 63 IN | HEART RATE: 90 BPM

## 2018-01-05 DIAGNOSIS — J30.89 CHRONIC NON-SEASONAL ALLERGIC RHINITIS, UNSPECIFIED TRIGGER: ICD-10-CM

## 2018-01-05 DIAGNOSIS — Z23 NEED FOR VACCINATION: ICD-10-CM

## 2018-01-05 DIAGNOSIS — Z12.39 SCREENING FOR BREAST CANCER: ICD-10-CM

## 2018-01-05 DIAGNOSIS — F33.1 MODERATE EPISODE OF RECURRENT MAJOR DEPRESSIVE DISORDER (HCC): ICD-10-CM

## 2018-01-05 DIAGNOSIS — H91.93 DECREASED HEARING OF BOTH EARS: ICD-10-CM

## 2018-01-05 DIAGNOSIS — E66.01 MORBID OBESITY WITH BMI OF 40.0-44.9, ADULT (HCC): ICD-10-CM

## 2018-01-05 DIAGNOSIS — Z72.0 TOBACCO ABUSE: ICD-10-CM

## 2018-01-05 DIAGNOSIS — R73.03 PREDIABETES: ICD-10-CM

## 2018-01-05 DIAGNOSIS — F41.9 ANXIETY: ICD-10-CM

## 2018-01-05 DIAGNOSIS — E78.5 MILD HYPERLIPIDEMIA: ICD-10-CM

## 2018-01-05 DIAGNOSIS — Z02.89 PAIN MANAGEMENT CONTRACT AGREEMENT: ICD-10-CM

## 2018-01-05 PROBLEM — R10.2 VAGINAL PAIN: Status: RESOLVED | Noted: 2017-06-27 | Resolved: 2018-01-05

## 2018-01-05 PROCEDURE — 90471 IMMUNIZATION ADMIN: CPT | Performed by: NURSE PRACTITIONER

## 2018-01-05 PROCEDURE — 90686 IIV4 VACC NO PRSV 0.5 ML IM: CPT | Performed by: NURSE PRACTITIONER

## 2018-01-05 PROCEDURE — 99213 OFFICE O/P EST LOW 20 MIN: CPT | Mod: 25 | Performed by: NURSE PRACTITIONER

## 2018-01-05 RX ORDER — HYDROXYZINE 50 MG/1
50 TABLET, FILM COATED ORAL 3 TIMES DAILY PRN
Qty: 90 TAB | Refills: 1 | Status: SHIPPED | OUTPATIENT
Start: 2018-01-05 | End: 2018-05-02 | Stop reason: SDUPTHER

## 2018-01-05 RX ORDER — CETIRIZINE HYDROCHLORIDE 10 MG/1
TABLET ORAL
Qty: 90 TAB | Refills: 2 | Status: SHIPPED | OUTPATIENT
Start: 2018-01-05 | End: 2018-10-04 | Stop reason: SDUPTHER

## 2018-01-05 ASSESSMENT — PATIENT HEALTH QUESTIONNAIRE - PHQ9: CLINICAL INTERPRETATION OF PHQ2 SCORE: 0

## 2018-01-05 NOTE — ASSESSMENT & PLAN NOTE
Patient has lost 5 lbs since last visit. Reports she is trying to eat healthier, it is tough for her to exercise due to pain. She is followed by [facility].

## 2018-01-05 NOTE — ASSESSMENT & PLAN NOTE
[facility]   Prescribes Norco, Meloxicam, Methocarbamol   MRI Cervical completed there, per patient, normal other than DDD

## 2018-01-05 NOTE — ASSESSMENT & PLAN NOTE
Patient was previously prescribed Lorazepam for anxiety, she has since weaned off this medication, currently using hydroxyzine PRN, reports that her anxiety is not well controlled, however, she is coping well. She has not seen psych in the past and does not plan to.

## 2018-01-05 NOTE — PROGRESS NOTES
Subjective:     Denisse Ramirez is a 57 y.o. female here today for evaluation and management of the following:     Pain management contract agreement  [facility]   Prescribes Norco, Meloxicam, Methocarbamol   MRI Cervical completed there, per patient, normal other than DDD       Anxiety  Patient was previously prescribed Lorazepam for anxiety, she has since weaned off this medication, currently using hydroxyzine PRN, reports that her anxiety is not well controlled, however, she is coping well. She has not seen psych in the past and does not plan to.     Moderate episode of recurrent major depressive disorder (CMS-HCC)  Patient denies SI/HI. She has not been seen by psych. Reports that hydroxyzine is not controlling her anxiety as well as lorazepam would but she is coping well enough per her report. She does continue with Celexa 40 mg daily. She is here with her granddaughter who she reports helps her mood tremendously.     Morbid obesity with BMI of 40.0-44.9, adult ([facility])  Patient has lost 5 lbs since last visit. Reports she is trying to eat healthier, it is tough for her to exercise due to pain. She is followed by [facility].     Tobacco abuse  Patient did quit 3 years ago but she has since started smoking, reports that she was under a lot of stress, a family member was facing residential time.     Prediabetes  Patient's most recent A1c 6.0, 2017. She does report polyuria and polydipsia. Due to repeat lab for monitoring.     Mild hyperlipidemia  Component Value Ref Range & Units Status      Cholesterol,Tot 200 (H) 100 - 199 mg/dL Final     Triglycerides 86 0 - 149 mg/dL Final     HDL 51 >=40 mg/dL Final      (H) <100 mg/dL Final     Most recent labs as listed above. Patient is now diagnosed with prediabetes, A1c 6.0. Patient does admit to poor diet.    Chronic non-seasonal allergic rhinitis  This is a chronic problem well controlled with cetirizine 10 mg daily, she requests a refill today.     Decreased hearing  of both ears  This is a new problem. Patient reports at times she has trouble hearing someone who is yelling for her in the next room, she requests a referral for hearing testing today.          Current medicines (including changes today)  Current Outpatient Prescriptions   Medication Sig Dispense Refill   • hydrOXYzine HCl (ATARAX) 50 MG Tab Take 1 Tab by mouth 3 times a day as needed for Itching or Anxiety. 90 Tab 1   • cetirizine (ZYRTEC) 10 MG Tab TAKE ONE TABLET BY MOUTH ONE TIME A DAY 90 Tab 2   • amitriptyline (ELAVIL) 50 MG Tab Take 2 Tabs by mouth at bedtime as needed. 60 Tab 0   • meloxicam (MOBIC) 15 MG tablet TAKE ONE TABLET BY MOUTH DAILY 30 Tab 1   • Esomeprazole Magnesium (NEXIUM) 20 MG Pack Take 20 mg by mouth every day. 90 Each 1   • hydrocodone/acetaminophen (NORCO)  MG Tab Take 1-2 Tabs by mouth every 6 hours as needed.     • citalopram (CELEXA) 40 MG Tab Take 1 Tab by mouth every day. 30 Tab 11   • lisinopril-hydrochlorothiazide (PRINZIDE, ZESTORETIC) 20-12.5 MG per tablet Take 1 Tab by mouth every day. 30 Tab 11   • albuterol 108 (90 Base) MCG/ACT Aero Soln inhalation aerosol Inhale 2 Puffs by mouth every 6 hours as needed for Shortness of Breath. 8.5 g 2     No current facility-administered medications for this visit.        She  has a past medical history of Anxiety; Deep vein blood clot of right lower extremity (CMS-HCC); Depression; Hypertension; and Insomnia.    She  has a past surgical history that includes tonsillectomy and abdominal exploration.     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 Positive for decreased hearing, reportedly. Positive for depression, anxiety. No SI/HI.   No fever, no chest pain, no shortness of breath, no abdominal pain, no rashes    All other systems reviewed and are negative.        Objective:     Blood pressure 136/78, pulse 90, temperature 36 °C (96.8 °F), resp. rate 20, height 1.6 m (5' 3\"), weight 111.1 kg (245 lb), SpO2 93 %. Body mass index is 43.4 kg/m².    Physical Exam:   Constitutional: Alert, no distress. Obese.   Eye: Equal, round and reactive, conjunctiva clear, lids normal.   ENMT: Lips without lesions, oropharynx clear. TMs normal. Normal whisper testing.   Neck: Trachea midline, no masses, no thyromegaly. No cervical or supraclavicular lymphadenopathy  Respiratory: Unlabored respiratory effort, lungs clear to auscultation, no wheezes, no ronchi.  Cardiovascular: Normal S1, S2, no murmur, no edema.   Abdomen: Soft, non-tender, no masses, no hepatosplenomegaly. Normal bowel sounds.   Skin: Warm, dry, good turgor, no rashes in visible areas.  Neuro:  normal sensation in extremities.   Psych: Alert and oriented x3, normal affect and mood.        Assessment and Plan:   The following treatment plan was discussed    1. Pain management contract agreement  Continue with pain management at [facility], they prescribe her controlled opiates.     2. Mild hyperlipidemia  Advised obtain fasting labs, will contact with results.   - LIPID PROFILE; Future    3. Prediabetes  Advised to repeat fasting labs, will notify with results. If worsening, may consider Metformin.   - HEMOGLOBIN A1C; Future  - COMP METABOLIC PANEL; Future  - TSH; Future    4. Tobacco abuse  Encouraged " cessation.     5. Chronic non-seasonal allergic rhinitis, unspecified trigger  Stable.   - cetirizine (ZYRTEC) 10 MG Tab; TAKE ONE TABLET BY MOUTH ONE TIME A DAY  Dispense: 90 Tab; Refill: 2    6. Moderate episode of recurrent major depressive disorder (CMS-HCC)  Appears stable despite report, continue with Celexa 40 mg daily. Discussed s/s to seek emergent care.     7. Anxiety  Advised will not prescribe Benzos, she needs to see Psych.   - hydrOXYzine HCl (ATARAX) 50 MG Tab; Take 1 Tab by mouth 3 times a day as needed for Itching or Anxiety.  Dispense: 90 Tab; Refill: 1    8. Decreased hearing of both ears  - REFERRAL TO AUDIOLOGY    9. Morbid obesity with BMI of 40.0-44.9, adult ([facility])  Discussed diet changes.   - Patient identified as having weight management issue.  Appropriate orders and counseling given.    10. Screening for breast cancer  Encouraged to obtain Mammo testing.   - [id number]; Future    11. Need for vaccination  - INFLUENZA VACCINE QUAD INJ >3Y(PF)       Reviewed indication, dosage, usage and potential adverse effects of prescribed medications. Patient appears to understand, verbalizes understanding and is willing to try medications as prescribed.      Reviewed risks and benefits of treatment plan. Patient verbally agrees to plan of care.       Followup: Return in about 3 months (around 4/5/2018) for Lab follow up.    TORI Acevedo.     PLEASE NOTE: This dictation was created using voice recognition software. I have made every reasonable attempt to correct obvious errors, but I expect that there may be errors of grammar and possibly content that I did not discover prior finalizing this note.

## 2018-01-05 NOTE — ASSESSMENT & PLAN NOTE
This is a new problem. Patient reports at times she has trouble hearing someone who is yelling for her in the next room, she requests a referral for hearing testing today.

## 2018-01-05 NOTE — PATIENT INSTRUCTIONS
[facility]  [address]  [facility] [address]  [phone]    Please obtain fasting labs prior to your next appointment. You may report to our clinic here in [place] Monday-Friday from 7:00am - 9:00am.     Please arrive fasting. Please do not eat or drink anything other than water for 8 hours prior to your arrival for labs.      Your medical care was provided today by: GELY Treviño    Thank You for the opportunity to serve you.    You may receive a brief survey in the mail shortly regarding your visit today. Please take a few moments to complete the survey and return it; no postage is necessary. We are working to serve our patient population better, improve customer service and our patients overall experience and your input can help us to accomplish this. We thank you for your help and for the opportunity to serve you today and in the future.     Special Instructions:  Always call 9-1-1 immediately if you develop a life threatening emergency.    Unless told otherwise please take all medications as directed and complete prescription therapies.     Watch for the following signs that require additional evaluation: progressive lethargy or unresponsiveness, localized pain (chest, abdomen), shortness of breath, painful breathing, progressive vomiting with weakness, bloody stools, or new rash.     If you are prescribed pain medication or any other medication that is sedating, do not take medication before or while operating a vehicle or heavy machinery or equipment due to potential side effects such as drowsiness and/or dizziness.

## 2018-01-05 NOTE — ASSESSMENT & PLAN NOTE
Patient denies SI/HI. She has not been seen by psych. Reports that hydroxyzine is not controlling her anxiety as well as lorazepam would but she is coping well enough per her report. She does continue with Celexa 40 mg daily. She is here with her granddaughter who she reports helps her mood tremendously.

## 2018-01-05 NOTE — ASSESSMENT & PLAN NOTE
Patient did quit 3 years ago but she has since started smoking, reports that she was under a lot of stress, a family member was facing [employer] time.

## 2018-01-05 NOTE — ASSESSMENT & PLAN NOTE
This is a chronic problem well controlled with cetirizine 10 mg daily, she requests a refill today.

## 2018-01-05 NOTE — ASSESSMENT & PLAN NOTE
Patient's most recent A1c 6.0, 2017. She does report polyuria and polydipsia. Due to repeat lab for monitoring.

## 2018-01-25 DIAGNOSIS — F51.01 PRIMARY INSOMNIA: ICD-10-CM

## 2018-01-25 RX ORDER — MELOXICAM 15 MG/1
TABLET ORAL
Qty: 30 TAB | Refills: 1 | Status: SHIPPED | OUTPATIENT
Start: 2018-01-25 | End: 2018-03-23 | Stop reason: SDUPTHER

## 2018-01-25 RX ORDER — AMITRIPTYLINE HYDROCHLORIDE 50 MG/1
100 TABLET, FILM COATED ORAL NIGHTLY PRN
Qty: 60 TAB | Refills: 2 | Status: SHIPPED | OUTPATIENT
Start: 2018-01-25 | End: 2018-05-07 | Stop reason: SDUPTHER

## 2018-02-02 DIAGNOSIS — I10 ESSENTIAL HYPERTENSION: ICD-10-CM

## 2018-02-02 RX ORDER — LISINOPRIL AND HYDROCHLOROTHIAZIDE 20; 12.5 MG/1; MG/1
1 TABLET ORAL DAILY
Qty: 90 TAB | Refills: 2 | Status: SHIPPED | OUTPATIENT
Start: 2018-02-02 | End: 2018-04-10 | Stop reason: SDUPTHER

## 2018-03-23 DIAGNOSIS — R52 PAIN: ICD-10-CM

## 2018-03-24 RX ORDER — MELOXICAM 15 MG/1
TABLET ORAL
Qty: 30 TAB | Refills: 1 | Status: SHIPPED | OUTPATIENT
Start: 2018-03-24 | End: 2018-04-10 | Stop reason: SDUPTHER

## 2018-04-10 ENCOUNTER — OFFICE VISIT (OUTPATIENT)
Dept: MEDICAL GROUP | Facility: CLINIC | Age: 58
End: 2018-04-10
Payer: MEDICAID

## 2018-04-10 VITALS
SYSTOLIC BLOOD PRESSURE: 132 MMHG | TEMPERATURE: 97.7 F | DIASTOLIC BLOOD PRESSURE: 84 MMHG | BODY MASS INDEX: 42.52 KG/M2 | OXYGEN SATURATION: 94 % | HEART RATE: 96 BPM | HEIGHT: 63 IN | WEIGHT: 240 LBS | RESPIRATION RATE: 18 BRPM

## 2018-04-10 DIAGNOSIS — Z02.89 PAIN MANAGEMENT CONTRACT AGREEMENT: ICD-10-CM

## 2018-04-10 DIAGNOSIS — E78.5 MILD HYPERLIPIDEMIA: ICD-10-CM

## 2018-04-10 DIAGNOSIS — F33.1 MODERATE EPISODE OF RECURRENT MAJOR DEPRESSIVE DISORDER (HCC): ICD-10-CM

## 2018-04-10 DIAGNOSIS — E66.01 MORBID OBESITY WITH BMI OF 40.0-44.9, ADULT (HCC): ICD-10-CM

## 2018-04-10 DIAGNOSIS — F11.20 UNCOMPLICATED OPIOID DEPENDENCE (HCC): ICD-10-CM

## 2018-04-10 DIAGNOSIS — F17.210 CIGARETTE NICOTINE DEPENDENCE WITHOUT COMPLICATION: ICD-10-CM

## 2018-04-10 DIAGNOSIS — R73.03 PREDIABETES: ICD-10-CM

## 2018-04-10 DIAGNOSIS — I10 ESSENTIAL HYPERTENSION: ICD-10-CM

## 2018-04-10 DIAGNOSIS — R60.0 BILATERAL LOWER EXTREMITY EDEMA: ICD-10-CM

## 2018-04-10 DIAGNOSIS — F43.9 STRESS AT HOME: ICD-10-CM

## 2018-04-10 DIAGNOSIS — N95.1 HOT FLASHES DUE TO MENOPAUSE: ICD-10-CM

## 2018-04-10 DIAGNOSIS — R94.31 ABNORMAL EKG: ICD-10-CM

## 2018-04-10 DIAGNOSIS — F15.91 HISTORY OF METHAMPHETAMINE USE: ICD-10-CM

## 2018-04-10 PROBLEM — R21 RASH AND NONSPECIFIC SKIN ERUPTION: Status: RESOLVED | Noted: 2017-03-30 | Resolved: 2018-04-10

## 2018-04-10 PROCEDURE — 93000 ELECTROCARDIOGRAM COMPLETE: CPT | Performed by: NURSE PRACTITIONER

## 2018-04-10 PROCEDURE — 99214 OFFICE O/P EST MOD 30 MIN: CPT | Performed by: NURSE PRACTITIONER

## 2018-04-10 RX ORDER — NICOTINE 21 MG/24HR
1 PATCH, TRANSDERMAL 24 HOURS TRANSDERMAL EVERY 24 HOURS
Qty: 30 PATCH | Refills: 0 | Status: SHIPPED | OUTPATIENT
Start: 2018-04-10 | End: 2018-06-12

## 2018-04-10 RX ORDER — ALBUTEROL SULFATE 90 UG/1
2 AEROSOL, METERED RESPIRATORY (INHALATION) EVERY 6 HOURS PRN
Qty: 8.5 G | Refills: 2 | Status: SHIPPED | OUTPATIENT
Start: 2018-04-10 | End: 2018-08-10 | Stop reason: SDUPTHER

## 2018-04-10 RX ORDER — MELOXICAM 15 MG/1
TABLET ORAL
Qty: 30 TAB | Refills: 2 | Status: SHIPPED | OUTPATIENT
Start: 2018-04-10 | End: 2022-11-11

## 2018-04-10 RX ORDER — LISINOPRIL AND HYDROCHLOROTHIAZIDE 20; 12.5 MG/1; MG/1
1 TABLET ORAL DAILY
Qty: 90 TAB | Refills: 2 | Status: SHIPPED | OUTPATIENT
Start: 2018-04-10 | End: 2019-04-15 | Stop reason: SDUPTHER

## 2018-04-10 RX ORDER — CITALOPRAM 40 MG/1
40 TABLET ORAL DAILY
Qty: 90 TAB | Refills: 1 | Status: SHIPPED | OUTPATIENT
Start: 2018-04-10 | End: 2018-06-12 | Stop reason: SDUPTHER

## 2018-04-10 NOTE — ASSESSMENT & PLAN NOTE
Patient's most recent A1c 6.0, 2017. She does report polyuria and polydipsia. Due to repeat lab for monitoring but she did not complete these, she plans to do so Friday.

## 2018-04-10 NOTE — ASSESSMENT & PLAN NOTE
This is a chronic problem. Patient denies SI/HI. She has not been seen by psych. Reports that hydroxyzine is not controlling her anxiety as well as lorazepam would but she is coping well enough per her report. She does continue with Celexa 40 mg daily.   She does inquire today re: Wellbutrin, which she has been prescribed in the past to help quit smoking. Reports no AE with use with Celexa and amitriptyline.

## 2018-04-10 NOTE — ASSESSMENT & PLAN NOTE
This is a chronic problem. May also be related to opiate use and withdrawal, especially at nighttime?

## 2018-04-10 NOTE — NON-PROVIDER
"Subjective:    Chief complaint:  Health maintenance and \"wants to quit smoking \".      History of present illness:  Pt is 56 y/o female who presents for regular health maintenance and states she wants to quit smoking.  States she would like to try Wellbutrin, she has tried other mood stabilizers in the past but none of them has worked for her like Wellbutrin works for her.  Also c/o sweating a lot and has had swelling in both her legs.     Past medical history:   Past Medical History:   Diagnosis Date   • Anxiety    • Deep vein blood clot of right lower extremity (CMS-HCC)     2003 due to birth control patch.   • Depression    • Hypertension    • Insomnia        Social history:     Objective:   Physical exam  General: Pt is a 56 y/o pleasant obese female.  Appears hyper, fidegity, sitting comfortably, and upright.  Speech is clear.  No signs of pain or distress. /84   Pulse 96   Temp 36.5 °C (97.7 °F)   Resp 18   Ht 1.6 m (5' 3\")   Wt 108.9 kg (240 lb)   SpO2 94%   BMI 42.51 kg/m²   Skin:  No rash noted.  Numerous old small scabbed and healed abrasions on bilat forearms and bilat calves.   HEENT: Symmetry of facial structure, expression, and muscle movement noted.  No facial trauma noted.  External examination of eye structures is normal.  Pupils are equal and round.  External ears are normal no inflammation or lesions.  Nasal passages are patent and no discharge noted. Buccal mucosa is pink, no masses, or lesions noted.  Gums pink no inflammation noted. Tongue is midline.  Oropharnyx is without lesions or exudate. Tonsils are +1.   Lymph: Cervical lymph nodes soft, non-tender, non-palpable.   Cardiac:  No murmurs, gallops, rubs, heard in all heart areas.   Pulmonary:  No respiratory distress noted. Breath sounds are clear throughout.  Gi: Abdomen is obese, soft, non-tender.  No masses palpated.   Musckoskeletal: Gait is independent, smooth, and well-coordinated.    Vascular:  Slight swelling BLE <1 cm. "

## 2018-04-10 NOTE — ASSESSMENT & PLAN NOTE
Patient did quit 3 years ago but she has since started smoking, reports that she was under a lot of stress, a family member was facing [employer] time. She is now down to 6-10 cigarettes/day, she reports she would like to quit today.

## 2018-04-10 NOTE — ASSESSMENT & PLAN NOTE
This is a chronic problem which has been well controlled with Lisiopril/HTCZ. Patient denies chest pain, SOB, palpitations. She does report sometimes she has noticed bilateral LE edema which is resolved overnight while she is sleeping. Denies unilateral swelling or pain.

## 2018-04-10 NOTE — PATIENT INSTRUCTIONS
Please obtain fasting labs prior to your next appointment. You may report to our clinic here in [place] Monday-Friday from 7:00am - 9:00am.     Please arrive fasting. Please do not eat or drink anything other than water for 8 hours prior to your arrival for labs.      ECHO    Audiology   [facility]EE[facility], FRANCOIS S   [address]  [facility] [address]  [phone]         [facility]  [address]  [facility] [address]  [phone]    Your medical care was provided today by: GELY Treviño    Thank You for the opportunity to serve you.    You may receive a brief survey in the mail shortly regarding your visit today. Please take a few moments to complete the survey and return it; no postage is necessary. We are working to serve our patient population better, improve customer service and our patients overall experience and your input can help us to accomplish this. We thank you for your help and for the opportunity to serve you today and in the future.     Special Instructions:  Always call 9-1-1 immediately if you develop a life threatening emergency.    Unless told otherwise please take all medications as directed and complete prescription therapies.     Watch for the following signs that require additional evaluation: progressive lethargy or unresponsiveness, localized pain (chest, abdomen), shortness of breath, painful breathing, progressive vomiting with weakness, bloody stools, or new rash.     If you are prescribed pain medication or any other medication that is sedating, do not take medication before or while operating a vehicle or heavy machinery or equipment due to potential side effects such as drowsiness and/or dizziness.

## 2018-04-10 NOTE — PROGRESS NOTES
Subjective:     Denisse Ramirez is a 57 y.o. female here today for evaluation and management of the following:     Uncomplicated opioid dependence (CMS-HCC)  Followed by [facility] who prescribes Norco  She has a history of abnormal drug screen + Oxy       Stress at home  Patient reports that she is often stressed at home, she'll start her . She currently is taking care of her grandchildren, her son was high on drugs and went to assisted recently.     Prediabetes  Patient's most recent A1c 6.0, 2017. She does report polyuria and polydipsia. Due to repeat lab for monitoring but she did not complete these, she plans to do so Friday.     Pain management contract agreement  [facility]   Prescribes Norco, Meloxicam, Methocarbamol   MRI Cervical completed there, per patient, normal other than DDD       Moderate episode of recurrent major depressive disorder (CMS-[facility])  This is a chronic problem. Patient denies SI/HI. She has not been seen by psych. Reports that hydroxyzine is not controlling her anxiety as well as lorazepam would but she is coping well enough per her report. She does continue with Celexa 40 mg daily.   She does inquire today re: Wellbutrin, which she has been prescribed in the past to help quit smoking. Reports no AE with use with Celexa and amitriptyline.     Mild hyperlipidemia  Component Value Ref Range & Units Status      Cholesterol,Tot 200 (H) 100 - 199 mg/dL Final     Triglycerides 86 0 - 149 mg/dL Final     HDL 51 >=40 mg/dL Final      (H) <100 mg/dL Final     Most recent labs as listed above. Patient is now diagnosed with prediabetes, A1c 6.0. Patient does admit to poor diet. She was due for fasting labs which she did not complete.     Hot flashes due to menopause  This is a chronic problem. May also be related to opiate use and withdrawal, especially at nighttime?     Essential hypertension  This is a chronic problem which has been well controlled with Lisiopril/HTCZ. Patient denies  chest pain, SOB, palpitations. She does report sometimes she has noticed bilateral LE edema which is resolved overnight while she is sleeping. Denies unilateral swelling or pain.     Cigarette nicotine dependence without complication  Patient did quit 3 years ago but she has since started smoking, reports that she was under a lot of stress, a family member was facing prison time. She is now down to 6-10 cigarettes/day, she reports she would like to quit today.    Bilateral lower extremity edema  See HTN note    Abnormal EKG  In clinic 4/10/2018  Possible lung disease?   Will order chest xray, ECHO, stress testing and advised to complete labs as ordered.        Current medicines (including changes today)  Current Outpatient Prescriptions   Medication Sig Dispense Refill   • nicotine (NICODERM) 14 MG/24HR PATCH 24 HR Apply 1 Patch to skin as directed every 24 hours. 30 Patch 0   • nicotine (NICODERM) 7 MG/24HR PATCH 24 HR Apply 1 Patch to skin as directed every 24 hours. 30 Patch 0   • meloxicam (MOBIC) 15 MG tablet TAKE ONE TABLET BY MOUTH DAILY 30 Tab 2   • albuterol 108 (90 Base) MCG/ACT Aero Soln inhalation aerosol Inhale 2 Puffs by mouth every 6 hours as needed for Shortness of Breath. 8.5 g 2   • lisinopril-hydrochlorothiazide (PRINZIDE, ZESTORETIC) 20-12.5 MG per tablet Take 1 Tab by mouth every day. 90 Tab 2   • citalopram (CELEXA) 40 MG Tab Take 1 Tab by mouth every day. 90 Tab 1   • amitriptyline (ELAVIL) 50 MG Tab Take 2 Tabs by mouth at bedtime as needed. 60 Tab 2   • hydrOXYzine HCl (ATARAX) 50 MG Tab Take 1 Tab by mouth 3 times a day as needed for Itching or Anxiety. 90 Tab 1   • cetirizine (ZYRTEC) 10 MG Tab TAKE ONE TABLET BY MOUTH ONE TIME A DAY 90 Tab 2   • Esomeprazole Magnesium (NEXIUM) 20 MG Pack Take 20 mg by mouth every day. 90 Each 1   • hydrocodone/acetaminophen (NORCO)  MG Tab Take 1-2 Tabs by mouth every 6 hours as needed.       No current facility-administered medications for this  "visit.        She  has a past medical history of Anxiety; Deep vein blood clot of right lower extremity (CMS-HCC); Depression; Hypertension; and Insomnia.    She  has a past surgical history that includes tonsillectomy and abdominal exploration.     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   No fever, no chest pain, no shortness of breath, no abdominal pain, no rashes    All other systems reviewed and are negative.        Objective:     Blood pressure 132/84, pulse 96, temperature 36.5 °C (97.7 °F), resp. rate 18, height 1.6 m (5' 3\"), weight 108.9 kg (240 lb), SpO2 94 %. Body mass index is 42.51 kg/m².    Physical Exam:   Constitutional: Alert, no distress.  Eye: Equal, round and reactive, conjunctiva clear, lids normal.   ENMT: Lips without lesions, oropharynx clear.   Neck: Trachea midline, no masses, no thyromegaly.   Respiratory: Unlabored respiratory effort, lungs clear to auscultation, no wheezes, no ronchi.  Cardiovascular: Normal S1, S2, no murmur, no edema.   Abdomen: Soft, non-tender, no masses, no hepatosplenomegaly. Normal bowel sounds.   Skin: Warm, dry, good turgor, several healed " scabs in LE. No s/s of infection.   Psych: Alert and oriented x3, normal affect and mood.        Assessment and Plan:   The following treatment plan was discussed    1. Morbid obesity with BMI of 40.0-44.9, adult ([facility])    2. Pain management contract agreement  Advised to continue f/u with [facility].     3. Mild hyperlipidemia  Advised to obtain fasting labs, will contact with results.     4. Stress at home    5. Prediabetes  Advised to obtain fasting labs, will contact with results.     6. Essential hypertension  Based on results, will advise to complete stress testing, ECHO and chest xray. I did advise she should quit smoking. Discussed s/s to seek emergent care. BP is stable.   - EKG    7. Cigarette nicotine dependence without complication  Will trial patches. We did discuss Wellbutrin if needed, but discussed the risks with amitriptyline.   - nicotine (NICODERM) 14 MG/24HR PATCH 24 HR; Apply 1 Patch to skin as directed every 24 hours.  Dispense: 30 Patch; Refill: 0  - nicotine (NICODERM) 7 MG/24HR PATCH 24 HR; Apply 1 Patch to skin as directed every 24 hours.  Dispense: 30 Patch; Refill: 0    8. Moderate episode of recurrent major depressive disorder (CMS-HCC)  Stable    9. Hot flashes due to menopause  Advised may also be related to opiate withdrawal?     10. Bilateral lower extremity edema  Discussed s/s to seek emergent care.   - ECHOCARDIOGRAM COMP W/O CONT; Future  - B TYPE NATRIURETIC    11. History of methamphetamine use    12. Uncomplicated opioid dependence (CMS-HCC)    13. Abnormal EKG  - NM-CARDIAC STRESS TEST; Future  - DX-CHEST-2 VIEWS; Future      Reviewed indication, dosage, usage and potential adverse effects of prescribed medications. Patient appears to understand, verbalizes understanding and is willing to try medications as prescribed.      Reviewed risks and benefits of treatment plan. Patient verbally agrees to plan of care.       Followup: Return in about 2 months (around 6/10/2018) for Lab  follow up, ECHO results .    TORI Acevedo.     PLEASE NOTE: This dictation was created using voice recognition software. I have made every reasonable attempt to correct obvious errors, but I expect that there may be errors of grammar and possibly content that I did not discover prior finalizing this note.

## 2018-04-10 NOTE — ASSESSMENT & PLAN NOTE
Patient reports that she is often stressed at home, she'll start her . She currently is taking care of her grandchildren, her son was high on drugs and went to skilled nursing recently.

## 2018-04-10 NOTE — ASSESSMENT & PLAN NOTE
In clinic 4/10/2018  Possible lung disease?   Will order chest xray, ECHO, stress testing and advised to complete labs as ordered.

## 2018-04-10 NOTE — ASSESSMENT & PLAN NOTE
Component Value Ref Range & Units Status      Cholesterol,Tot 200 (H) 100 - 199 mg/dL Final     Triglycerides 86 0 - 149 mg/dL Final     HDL 51 >=40 mg/dL Final      (H) <100 mg/dL Final     Most recent labs as listed above. Patient is now diagnosed with prediabetes, A1c 6.0. Patient does admit to poor diet. She was due for fasting labs which she did not complete.

## 2018-04-12 RX ORDER — ESOMEPRAZOLE MAGNESIUM 20 MG/1
20 GRANULE, DELAYED RELEASE ORAL DAILY
Qty: 90 EACH | Refills: 3 | Status: SHIPPED | OUTPATIENT
Start: 2018-04-12 | End: 2019-04-15 | Stop reason: SDUPTHER

## 2018-05-02 DIAGNOSIS — F41.9 ANXIETY: ICD-10-CM

## 2018-05-02 RX ORDER — HYDROXYZINE 50 MG/1
50 TABLET, FILM COATED ORAL 3 TIMES DAILY PRN
Qty: 90 TAB | Refills: 2 | Status: SHIPPED | OUTPATIENT
Start: 2018-05-02 | End: 2019-04-15

## 2018-05-07 DIAGNOSIS — F51.01 PRIMARY INSOMNIA: ICD-10-CM

## 2018-05-07 RX ORDER — AMITRIPTYLINE HYDROCHLORIDE 50 MG/1
100 TABLET, FILM COATED ORAL NIGHTLY PRN
Qty: 180 TAB | Refills: 0 | Status: SHIPPED | OUTPATIENT
Start: 2018-05-07 | End: 2018-06-14 | Stop reason: SDUPTHER

## 2018-06-12 ENCOUNTER — OFFICE VISIT (OUTPATIENT)
Dept: MEDICAL GROUP | Facility: CLINIC | Age: 58
End: 2018-06-12
Payer: MEDICAID

## 2018-06-12 VITALS
DIASTOLIC BLOOD PRESSURE: 82 MMHG | HEART RATE: 86 BPM | RESPIRATION RATE: 18 BRPM | HEIGHT: 63 IN | SYSTOLIC BLOOD PRESSURE: 134 MMHG | WEIGHT: 248 LBS | BODY MASS INDEX: 43.94 KG/M2 | OXYGEN SATURATION: 90 % | TEMPERATURE: 97.8 F

## 2018-06-12 DIAGNOSIS — Z02.89 PAIN MANAGEMENT CONTRACT AGREEMENT: ICD-10-CM

## 2018-06-12 DIAGNOSIS — Z12.11 COLON CANCER SCREENING: ICD-10-CM

## 2018-06-12 DIAGNOSIS — I10 ESSENTIAL HYPERTENSION: ICD-10-CM

## 2018-06-12 DIAGNOSIS — R73.03 PREDIABETES: ICD-10-CM

## 2018-06-12 DIAGNOSIS — N95.1 HOT FLASHES DUE TO MENOPAUSE: ICD-10-CM

## 2018-06-12 DIAGNOSIS — F17.210 CIGARETTE NICOTINE DEPENDENCE WITHOUT COMPLICATION: ICD-10-CM

## 2018-06-12 DIAGNOSIS — F33.1 MODERATE EPISODE OF RECURRENT MAJOR DEPRESSIVE DISORDER (HCC): ICD-10-CM

## 2018-06-12 DIAGNOSIS — R21 RASH: ICD-10-CM

## 2018-06-12 PROCEDURE — 99214 OFFICE O/P EST MOD 30 MIN: CPT | Performed by: NURSE PRACTITIONER

## 2018-06-12 RX ORDER — GABAPENTIN 300 MG/1
300 CAPSULE ORAL 3 TIMES DAILY
COMMUNITY
End: 2019-04-15

## 2018-06-12 RX ORDER — BENZOCAINE/MENTHOL 6 MG-10 MG
LOZENGE MUCOUS MEMBRANE
Qty: 1 TUBE | Refills: 2 | Status: SHIPPED | OUTPATIENT
Start: 2018-06-12 | End: 2018-09-28 | Stop reason: SDUPTHER

## 2018-06-12 RX ORDER — CITALOPRAM 40 MG/1
40 TABLET ORAL DAILY
Qty: 90 TAB | Refills: 1 | Status: SHIPPED | OUTPATIENT
Start: 2018-06-12 | End: 2019-04-15

## 2018-06-12 NOTE — PATIENT INSTRUCTIONS
Your medical care was provided today by: GELY Treviño    Thank You for the opportunity to serve you.    You may receive a brief survey in the mail shortly regarding your visit today. Please take a few moments to complete the survey and return it; no postage is necessary. We are working to serve our patient population better, improve customer service and our patients overall experience and your input can help us to accomplish this. We thank you for your help and for the opportunity to serve you today and in the future.     Special Instructions:  Always call 9-1-1 immediately if you develop a life threatening emergency.    Unless told otherwise please take all medications as directed and complete prescription therapies.     Watch for the following signs that require additional evaluation: progressive lethargy or unresponsiveness, localized pain (chest, abdomen), shortness of breath, painful breathing, progressive vomiting with weakness, bloody stools, or new rash.     If you are prescribed pain medication or any other medication that is sedating, do not take medication before or while operating a vehicle or heavy machinery or equipment due to potential side effects such as drowsiness and/or dizziness.

## 2018-06-14 DIAGNOSIS — F51.01 PRIMARY INSOMNIA: ICD-10-CM

## 2018-06-14 RX ORDER — AMITRIPTYLINE HYDROCHLORIDE 50 MG/1
100 TABLET, FILM COATED ORAL NIGHTLY PRN
Qty: 180 TAB | Refills: 0 | Status: SHIPPED | OUTPATIENT
Start: 2018-06-14 | End: 2018-08-06 | Stop reason: SDUPTHER

## 2018-06-14 NOTE — ASSESSMENT & PLAN NOTE
This is a chronic problem. Patient denies SI/HI. She has not been seen by psych. Reports that hydroxyzine is not controlling her anxiety as well as lorazepam still but understands we will not prescribe this medication, especially considering she is now followed by [facility] who prescribes daily opiates for pain. She does continue with Celexa 40 mg daily.  Reports no AE with use with Celexa and amitriptyline. She does report she has been under a little more stress, her  it currently at an assisted living, but she reports at times it is now less stressful since she does not have to take care of him, she plans to take a trip to go see her Grandchildren.

## 2018-06-14 NOTE — PROGRESS NOTES
Subjective:     Denisse Ramirez is a 57 y.o. female here today for evaluation and management of the following:     Cigarette nicotine dependence without complication  Approx 35 pack year history, she has quit a few times, most recently about 4 years ago but she had restarted, she now reports she has not smoked in about 2 months.     Essential hypertension  This is a chronic problem which has been well controlled with Lisiopril/HTCZ. Patient denies chest pain, SOB, palpitations. She reports since last visit she has not had much lower leg swelling at all. Denies unilateral swelling or pain.     Hot flashes due to menopause  This is a chronic problem which patient reports is most at nighttime.    Moderate episode of recurrent major depressive disorder (CMS-HCC)  This is a chronic problem. Patient denies SI/HI. She has not been seen by psych. Reports that hydroxyzine is not controlling her anxiety as well as lorazepam still but understands we will not prescribe this medication, especially considering she is now followed by [facility] who prescribes daily opiates for pain. She does continue with Celexa 40 mg daily.  Reports no AE with use with Celexa and amitriptyline. She does report she has been under a little more stress, her  it currently at an assisted living, but she reports at times it is now less stressful since she does not have to take care of him, she plans to take a trip to go see her Grandchildren.     Pain management contract agreement  [facility]   Prescribes Norco, Meloxicam, Methocarbamol   MRI Cervical completed there, per patient, normal other than DDD       Prediabetes  Patient's most recent A1c 6.0, 2017. She does report polyuria and polydipsia at times. Due to repeat lab for monitoring but she did not complete these, still even since our last visit. She does inquire re: weight loss today, reports she cannot lose weight, although she admits to poor diet, drinking Mountain Dew today in clinic.       Rash  Patient reports continued healing scabs on UE. 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 She has been using some topical triple abx ointment which she says does help. Denies any fever, chills. She does admit she does pick at her arms. She is most concerned this gets worse when she is sweating.          Current medicines (including changes today)  Current Outpatient Prescriptions   Medication Sig Dispense Refill   • gabapentin (NEURONTIN) 300 MG Cap Take 300 mg by mouth 3 times a day.     • hydrocortisone 1 % Cream Apply to affect area twice daily as needed for 10 days 1 Tube 2   • citalopram (CELEXA) 40 MG Tab Take 1 Tab by mouth every day. 90 Tab 1   • amitriptyline (ELAVIL) 50 MG Tab Take 2 Tabs by mouth at bedtime as needed. 180 Tab 0   • hydrOXYzine HCl (ATARAX) 50 MG Tab Take 1 Tab by mouth 3 times a day as needed for Anxiety. 90 Tab 2   • Esomeprazole Magnesium (NEXIUM) 20 MG Pack Take 20 mg by mouth every day. 90 Each 3   • meloxicam (MOBIC) 15 MG tablet TAKE ONE TABLET BY MOUTH DAILY 30 Tab 2   • albuterol 108 (90 Base) MCG/ACT Aero Soln inhalation aerosol Inhale 2 Puffs by mouth every 6 hours as needed for Shortness of Breath. 8.5 g 2   • lisinopril-hydrochlorothiazide (PRINZIDE, ZESTORETIC) 20-12.5 MG per tablet Take 1 Tab by mouth every day. 90 Tab 2   • cetirizine (ZYRTEC) 10 MG Tab TAKE ONE TABLET BY MOUTH ONE TIME A DAY 90 Tab 2   • hydrocodone/acetaminophen (NORCO)  MG Tab Take 1-2 Tabs by mouth every 6 hours as needed.       No current facility-administered medications for this visit.        She  has a past medical history of Anxiety; Deep vein blood clot of right lower extremity (HCC); Depression; Hypertension; and Insomnia.    She  has a past surgical history that includes tonsillectomy and abdominal exploration.    Social History     Social History   • Marital status:      Spouse name: N/A   • Number of children: N/A   • Years of education: N/A  "    Occupational History   • Not on file.     Social History Main Topics   • Smoking status: Former Smoker     Packs/day: 1.00     Years: 35.00     Types: Cigarettes     Quit date: 4/17/2018   • Smokeless tobacco: Never Used      Comment: reports she hasnt smoked in two months   • Alcohol use 3.5 oz/week     7 Glasses of wine per week      Comment: 1 glass wine/mo   • Drug use: No      Comment: 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   • Sexual activity: Not on file     Other Topics Concern   • Not on file     Social History Narrative   • No narrative on file       Tetanus toxoids      ROS  Positive for night sweats. Positive for scabs/rash in UE, unchanged since last visit.   No fever, no vision changes, no headache, no chest pain, no edema, no shortness of breath, no abdominal pain, no rashes, no sores     All other systems reviewed and are negative.        Objective:     Blood pressure 134/82, pulse 86, temperature 36.6 °C (97.8 °F), resp. rate 18, height 1.6 m (5' 3\"), weight 112.5 kg (248 lb), SpO2 90 %. Body mass index is 43.93 kg/m².    Physical Exam:   Constitutional: Alert, no distress. Obese  Eye: Equal, round and reactive, conjunctiva clear, lids normal.   ENMT: Lips without lesions, oropharynx clear.   Neck: Trachea midline, no masses, no thyromegaly.   Respiratory: Unlabored respiratory effort, lungs clear to auscultation, no wheezes, no ronchi.  Cardiovascular: Normal S1, S2, no murmur, no edema.   Abdomen: Soft, non-tender, no masses, no hepatosplenomegaly. Normal bowel sounds.   Skin: Warm, dry, good turgor, several healing scabs on UE and LE. No s/s of infection.   Psych: Alert and oriented x3, normal affect and mood.      Assessment and Plan:   The following treatment plan was discussed    1. Essential hypertension  Stable. Advised to complete labs as ordered so we can f/u and discuss.     2. Hot flashes due to menopause  May also be related to opiate withdrawal at nighttime? She is already taking an " SSRI. We did discuss HRT but discussed the risks and at this time will continue with only conservative treatment.     3. Pain management contract agreement  She will continue f/u with [facility].     4. Cigarette nicotine dependence without complication  Encouraged continued cessation.     5. Prediabetes  Advised to complete labs. Discussed we may consider metformin which may aid in weight loss as well.     6. Rash  Discussed s/s to seek emergent care.   - hydrocortisone 1 % Cream; Apply to affect area twice daily as needed for 10 days  Dispense: 1 Tube; Refill: 2    7. Moderate episode of recurrent major depressive disorder (HCC)  Stable.   - citalopram (CELEXA) 40 MG Tab; Take 1 Tab by mouth every day.  Dispense: 90 Tab; Refill: 1    8. Colon cancer screening  - OCCULT BLOOD FECES IMMUNOASSAY (FIT); Future      Discussed diet, exercise, disease management and weight loss goals      Reviewed indication, dosage, usage and potential adverse effects of prescribed medications. Patient appears to understand, verbalizes understanding and is willing to try medications as prescribed.      Reviewed risks and benefits of treatment plan. Patient verbally agrees to plan of care.       Followup: Return in about 1 month (around 7/12/2018) for Lab follow up.    ALICIA [email or web address].     PLEASE NOTE: This dictation was created using voice recognition software. I have made every reasonable attempt to correct obvious errors, but I expect that there may be errors of grammar and possibly content that I did not discover prior finalizing this note.

## 2018-06-14 NOTE — ASSESSMENT & PLAN NOTE
Patient reports continued healing scabs on UE. 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 She has been using some topical triple abx ointment which she says does help. Denies any fever, chills. She does admit she does pick at her arms. She is most concerned this gets worse when she is sweating.

## 2018-06-14 NOTE — ASSESSMENT & PLAN NOTE
Patient's most recent A1c 6.0, 2017. She does report polyuria and polydipsia at times. Due to repeat lab for monitoring but she did not complete these, still even since our last visit. She does inquire re: weight loss today, reports she cannot lose weight, although she admits to poor diet, drinking Mountain Dew today in clinic.

## 2018-06-14 NOTE — ASSESSMENT & PLAN NOTE
Approx 35 pack year history, she has quit a few times, most recently about 4 years ago but she had restarted, she now reports she has not smoked in about 2 months.

## 2018-06-14 NOTE — ASSESSMENT & PLAN NOTE
This is a chronic problem which has been well controlled with Lisiopril/HTCZ. Patient denies chest pain, SOB, palpitations. She reports since last visit she has not had much lower leg swelling at all. Denies unilateral swelling or pain.

## 2018-07-09 ENCOUNTER — NON-PROVIDER VISIT (OUTPATIENT)
Dept: MEDICAL GROUP | Facility: CLINIC | Age: 58
End: 2018-07-09
Payer: MEDICAID

## 2018-07-09 ENCOUNTER — HOSPITAL ENCOUNTER (OUTPATIENT)
Facility: MEDICAL CENTER | Age: 58
End: 2018-07-09
Attending: NURSE PRACTITIONER
Payer: MEDICAID

## 2018-07-09 DIAGNOSIS — R73.03 PREDIABETES: ICD-10-CM

## 2018-07-09 DIAGNOSIS — E78.5 MILD HYPERLIPIDEMIA: ICD-10-CM

## 2018-07-09 LAB
ALBUMIN SERPL BCP-MCNC: 4.3 G/DL (ref 3.2–4.9)
ALBUMIN/GLOB SERPL: 1.5 G/DL
ALP SERPL-CCNC: 70 U/L (ref 30–99)
ALT SERPL-CCNC: 20 U/L (ref 2–50)
ANION GAP SERPL CALC-SCNC: 6 MMOL/L (ref 0–11.9)
AST SERPL-CCNC: 20 U/L (ref 12–45)
BILIRUB SERPL-MCNC: 0.3 MG/DL (ref 0.1–1.5)
BUN SERPL-MCNC: 18 MG/DL (ref 8–22)
CALCIUM SERPL-MCNC: 9.6 MG/DL (ref 8.5–10.5)
CHLORIDE SERPL-SCNC: 102 MMOL/L (ref 96–112)
CHOLEST SERPL-MCNC: 174 MG/DL (ref 100–199)
CO2 SERPL-SCNC: 32 MMOL/L (ref 20–33)
CREAT SERPL-MCNC: 0.87 MG/DL (ref 0.5–1.4)
EST. AVERAGE GLUCOSE BLD GHB EST-MCNC: 128 MG/DL
GLOBULIN SER CALC-MCNC: 2.8 G/DL (ref 1.9–3.5)
GLUCOSE SERPL-MCNC: 117 MG/DL (ref 65–99)
HBA1C MFR BLD: 6.1 % (ref 0–5.6)
HDLC SERPL-MCNC: 46 MG/DL
LDLC SERPL CALC-MCNC: 93 MG/DL
POTASSIUM SERPL-SCNC: 4.2 MMOL/L (ref 3.6–5.5)
PROT SERPL-MCNC: 7.1 G/DL (ref 6–8.2)
SODIUM SERPL-SCNC: 140 MMOL/L (ref 135–145)
TRIGL SERPL-MCNC: 175 MG/DL (ref 0–149)
TSH SERPL DL<=0.005 MIU/L-ACNC: 2.93 UIU/ML (ref 0.38–5.33)

## 2018-07-09 PROCEDURE — 80053 COMPREHEN METABOLIC PANEL: CPT

## 2018-07-09 PROCEDURE — 99000 SPECIMEN HANDLING OFFICE-LAB: CPT | Performed by: PHYSICIAN ASSISTANT

## 2018-07-09 PROCEDURE — 83036 HEMOGLOBIN GLYCOSYLATED A1C: CPT

## 2018-07-09 PROCEDURE — 80061 LIPID PANEL: CPT

## 2018-07-09 PROCEDURE — 84443 ASSAY THYROID STIM HORMONE: CPT

## 2018-07-09 PROCEDURE — 36415 COLL VENOUS BLD VENIPUNCTURE: CPT | Performed by: PHYSICIAN ASSISTANT

## 2018-07-31 ENCOUNTER — OFFICE VISIT (OUTPATIENT)
Dept: MEDICAL GROUP | Facility: CLINIC | Age: 58
End: 2018-07-31
Payer: MEDICAID

## 2018-07-31 VITALS
SYSTOLIC BLOOD PRESSURE: 132 MMHG | BODY MASS INDEX: 43.05 KG/M2 | RESPIRATION RATE: 18 BRPM | WEIGHT: 243 LBS | TEMPERATURE: 97.2 F | HEART RATE: 100 BPM | OXYGEN SATURATION: 91 % | DIASTOLIC BLOOD PRESSURE: 74 MMHG

## 2018-07-31 DIAGNOSIS — Z87.891 HISTORY OF TOBACCO USE DISORDER: ICD-10-CM

## 2018-07-31 DIAGNOSIS — Z12.11 SCREENING FOR COLON CANCER: ICD-10-CM

## 2018-07-31 DIAGNOSIS — E66.01 MORBID OBESITY WITH BMI OF 40.0-44.9, ADULT (HCC): ICD-10-CM

## 2018-07-31 DIAGNOSIS — R73.03 PREDIABETES: ICD-10-CM

## 2018-07-31 DIAGNOSIS — E78.5 MILD HYPERLIPIDEMIA: ICD-10-CM

## 2018-07-31 DIAGNOSIS — Z23 NEED FOR SHINGLES VACCINE: ICD-10-CM

## 2018-07-31 PROCEDURE — 90750 HZV VACC RECOMBINANT IM: CPT | Performed by: NURSE PRACTITIONER

## 2018-07-31 PROCEDURE — 90471 IMMUNIZATION ADMIN: CPT | Performed by: NURSE PRACTITIONER

## 2018-07-31 PROCEDURE — 99213 OFFICE O/P EST LOW 20 MIN: CPT | Mod: 25 | Performed by: NURSE PRACTITIONER

## 2018-08-02 NOTE — ASSESSMENT & PLAN NOTE
Cholesterol,Tot 174  100 - 199 mg/dL Final   Triglycerides 175   0 - 149 mg/dL Final   HDL 46  >=40 mg/dL Final   LDL 93  <100 mg/dL Final     This is a chronic problem, most recent labs as listed above are improved since last check. This is due to her weight loss and change in diet.

## 2018-08-02 NOTE — ASSESSMENT & PLAN NOTE
Patient's most recent A1c 6.1, which is stable since 2017. She does report polyuria and polydipsia at times. She reports that she has been working on her diet and so the symptoms have decreased, she has lost 5 lbs since our last visit.

## 2018-08-02 NOTE — PROGRESS NOTES
Subjective:     Denisse Ramirez is a 57 y.o. female here today for lab follow up:     History of tobacco use disorder  Approx 35 pack year history.   She reports that she quit smoking in April 2018 and has not restarted, she does admit that the temptation is still there to smoke since her partner still smokes.     Prediabetes  Patient's most recent A1c 6.1, which is stable since 2017. She does report polyuria and polydipsia at times. She reports that she has been working on her diet and so the symptoms have decreased, she has lost 5 lbs since our last visit.      Mild hyperlipidemia  Cholesterol,Tot 174  100 - 199 mg/dL Final   Triglycerides 175   0 - 149 mg/dL Final   HDL 46  >=40 mg/dL Final   LDL 93  <100 mg/dL Final     This is a chronic problem, most recent labs as listed above are improved since last check. This is due to her weight loss and change in diet.          Current medicines (including changes today)  Current Outpatient Prescriptions   Medication Sig Dispense Refill   • amitriptyline (ELAVIL) 50 MG Tab Take 2 Tabs by mouth at bedtime as needed. 180 Tab 0   • gabapentin (NEURONTIN) 300 MG Cap Take 300 mg by mouth 3 times a day.     • hydrocortisone 1 % Cream Apply to affect area twice daily as needed for 10 days 1 Tube 2   • citalopram (CELEXA) 40 MG Tab Take 1 Tab by mouth every day. 90 Tab 1   • hydrOXYzine HCl (ATARAX) 50 MG Tab Take 1 Tab by mouth 3 times a day as needed for Anxiety. 90 Tab 2   • Esomeprazole Magnesium (NEXIUM) 20 MG Pack Take 20 mg by mouth every day. 90 Each 3   • meloxicam (MOBIC) 15 MG tablet TAKE ONE TABLET BY MOUTH DAILY 30 Tab 2   • albuterol 108 (90 Base) MCG/ACT Aero Soln inhalation aerosol Inhale 2 Puffs by mouth every 6 hours as needed for Shortness of Breath. 8.5 g 2   • lisinopril-hydrochlorothiazide (PRINZIDE, ZESTORETIC) 20-12.5 MG per tablet Take 1 Tab by mouth every day. 90 Tab 2   • cetirizine (ZYRTEC) 10 MG Tab TAKE ONE TABLET BY MOUTH ONE TIME A DAY 90 Tab 2    • hydrocodone/acetaminophen (NORCO)  MG Tab Take 1-2 Tabs by mouth every 6 hours as needed.       No current facility-administered medications for this visit.        She  has a past medical history of Anxiety; Deep vein blood clot of right lower extremity (HCC); Depression; Hypertension; and Insomnia.    She  has a past surgical history that includes tonsillectomy and abdominal exploration.     Social History     Social History   • Marital status:      Spouse name: N/A   • Number of children: N/A   • Years of education: N/A     Social History Main Topics   • Smoking status: Former Smoker     Packs/day: 1.00     Years: 35.00     Types: Cigarettes     Quit date: 4/17/2018   • Smokeless tobacco: Never Used      Comment: reports she hasnt smoked in three months   • Alcohol use Yes      Comment: 1 glass wine/mo   • Drug use: No      Comment: 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   • Sexual activity: Not on file     Other Topics Concern   • Not on file     Social History Narrative   • No narrative on file       Family History   Problem Relation Age of Onset   • Lung Disease Mother    • Cancer Father    • Heart Disease Father    • Lung Disease Brother    • Lung Disease Brother          ROS  No fever, no chest pain, no shortness of breath, no abdominal pain, no rashes    All other systems reviewed and are negative.        Objective:     Blood pressure 132/74, pulse 100, temperature 36.2 °C (97.2 °F), resp. rate 18, weight 110.2 kg (243 lb), SpO2 91 %. Body mass index is 43.05 kg/m².    Physical Exam:   Constitutional: Alert, no distress. Obese.   Eye: Equal, round and reactive, conjunctiva clear, lids normal.   Respiratory: Unlabored respiratory effort, lungs clear to auscultation, no wheezes, no ronchi.  Cardiovascular: Normal S1, S2, no murmur, no edema.   Abdomen: Soft, non-tender, no masses, no hepatosplenomegaly. Normal bowel sounds.   Skin: Warm, dry, good turgor, no rashes in visible areas.   Psych:  Alert and oriented x3, normal affect and mood.        Assessment and Plan:   The following treatment plan was discussed    1. Need for shingles vaccine  - Shingles Vaccine (Shingrix)    2. Prediabetes  Stable, will continue to monitor, encouraged continued dietary changes. Discussed s/s to seek care.     3. History of tobacco use disorder  Encouraged continued cessation.     4. Mild hyperlipidemia  Improved, encouraged continued positive dietary change.     5. Screening for colon cancer  - OCCULT BLOOD FECES IMMUNOASSAY; Future    6. Morbid obesity with BMI of 40.0-44.9, adult (HCC)  - Patient identified as having weight management issue.  Appropriate orders and counseling given.    Reviewed risks and benefits of treatment plan. Patient verbally agrees to plan of care.       Followup: Return in about 6 months (around 1/31/2019).    TORI Acevedo.     PLEASE NOTE: This dictation was created using voice recognition software. I have made every reasonable attempt to correct obvious errors, but I expect that there may be errors of grammar and possibly content that I did not discover prior finalizing this note.

## 2018-08-02 NOTE — ASSESSMENT & PLAN NOTE
Approx 35 pack year history.   She reports that she quit smoking in April 2018 and has not restarted, she does admit that the temptation is still there to smoke since her partner still smokes.

## 2018-10-04 DIAGNOSIS — J30.89 CHRONIC NON-SEASONAL ALLERGIC RHINITIS: ICD-10-CM

## 2018-10-04 RX ORDER — CETIRIZINE HYDROCHLORIDE 10 MG/1
TABLET ORAL
Qty: 90 TAB | Refills: 2 | Status: SHIPPED | OUTPATIENT
Start: 2018-10-04 | End: 2019-04-16 | Stop reason: SDUPTHER

## 2018-10-04 NOTE — TELEPHONE ENCOUNTER
Was the patient seen in the last year in this department? Yes    Does patient have an active prescription for medications requested? Yes    Received Request Via: Patient   Last Appointment 7/31/18  Last Labs 7/9/18

## 2018-10-30 ENCOUNTER — TELEPHONE (OUTPATIENT)
Dept: MEDICAL GROUP | Facility: PHYSICIAN GROUP | Age: 58
End: 2018-10-30

## 2018-10-30 DIAGNOSIS — H91.90 DECREASED HEARING, UNSPECIFIED LATERALITY: ICD-10-CM

## 2018-10-30 NOTE — TELEPHONE ENCOUNTER
1. Caller Name: Piero                                         Call Back Number: [phone] (home)         Patient approves a detailed voicemail message: N\A    2. SPECIFIC Action To Be Taken: Orders pending, please sign.    3. Diagnosis/Clinical Reason for Request: hearing test    4. Specialty & Provider Name/Lab/Imaging Location: hearing center on [facility] [place]    5. Is appointment scheduled for requested order/referral: no    Patient was not informed they will receive a return phone call from the office ONLY if there are any questions before processing their request. Advised to call back if they haven't received a call from the referral department in 5 days.

## 2018-12-07 DIAGNOSIS — I10 ESSENTIAL HYPERTENSION: ICD-10-CM

## 2018-12-07 RX ORDER — LISINOPRIL AND HYDROCHLOROTHIAZIDE 20; 12.5 MG/1; MG/1
TABLET ORAL
Qty: 90 TAB | Refills: 1 | Status: SHIPPED | OUTPATIENT
Start: 2018-12-07 | End: 2019-04-15

## 2018-12-07 NOTE — TELEPHONE ENCOUNTER
Refill completed. She has no showed or canceled several times, please encourage to make an appt. Thank you.   TORI Acevedo.

## 2018-12-07 NOTE — TELEPHONE ENCOUNTER
Was the patient seen in the last year in this department? Yes 7/31/18    Does patient have an active prescription for medications requested? Yes    Received Request Via: Patient     Last Lab:  Component      Latest Ref Rng & Units 7/9/2018   Sodium      135 - 145 mmol/L 140   Potassium      3.6 - 5.5 mmol/L 4.2   Chloride      96 - 112 mmol/L 102   Co2      20 - 33 mmol/L 32   Anion Gap      0.0 - 11.9 6.0   Glucose      65 - 99 mg/dL 117 (H)   Bun      8 - 22 mg/dL 18   Creatinine      0.50 - 1.40 mg/dL 0.87   Calcium      8.5 - 10.5 mg/dL 9.6   AST(SGOT)      12 - 45 U/L 20   ALT(SGPT)      2 - 50 U/L 20   Alkaline Phosphatase      30 - 99 U/L 70   Total Bilirubin      0.1 - 1.5 mg/dL 0.3   Albumin      3.2 - 4.9 g/dL 4.3   Total Protein      6.0 - 8.2 g/dL 7.1   Globulin      1.9 - 3.5 g/dL 2.8   A-G Ratio      g/dL 1.5   Cholesterol,Tot      100 - 199 mg/dL 174   Triglycerides      0 - 149 mg/dL 175 (H)   HDL      >=40 mg/dL 46   LDL      <100 mg/dL 93   Glycohemoglobin      0.0 - 5.6 % 6.1 (H)   Estim. Avg Glu      mg/dL 128   GFR If African American      >60 mL/min/1.73 m 2 >60   GFR If Non African American      >60 mL/min/1.73 m 2 >60   TSH      0.380 - 5.330 uIU/mL 2.930

## 2019-04-15 ENCOUNTER — OFFICE VISIT (OUTPATIENT)
Dept: MEDICAL GROUP | Facility: CLINIC | Age: 59
End: 2019-04-15
Payer: MEDICAID

## 2019-04-15 VITALS
OXYGEN SATURATION: 92 % | SYSTOLIC BLOOD PRESSURE: 140 MMHG | BODY MASS INDEX: 42.51 KG/M2 | HEIGHT: 64 IN | TEMPERATURE: 97.3 F | HEART RATE: 85 BPM | RESPIRATION RATE: 16 BRPM | WEIGHT: 249 LBS | DIASTOLIC BLOOD PRESSURE: 82 MMHG

## 2019-04-15 DIAGNOSIS — Z12.11 SCREENING FOR COLON CANCER: ICD-10-CM

## 2019-04-15 DIAGNOSIS — R05.9 COUGH: ICD-10-CM

## 2019-04-15 DIAGNOSIS — R73.03 PREDIABETES: ICD-10-CM

## 2019-04-15 DIAGNOSIS — F31.81 BIPOLAR 2 DISORDER, MAJOR DEPRESSIVE EPISODE (HCC): ICD-10-CM

## 2019-04-15 DIAGNOSIS — E78.5 MILD HYPERLIPIDEMIA: ICD-10-CM

## 2019-04-15 DIAGNOSIS — Z12.39 SCREENING FOR BREAST CANCER: ICD-10-CM

## 2019-04-15 LAB
FLUAV+FLUBV AG SPEC QL IA: NORMAL
INT CON NEG: NEGATIVE
INT CON POS: POSITIVE

## 2019-04-15 PROCEDURE — 99214 OFFICE O/P EST MOD 30 MIN: CPT | Performed by: NURSE PRACTITIONER

## 2019-04-15 PROCEDURE — 87804 INFLUENZA ASSAY W/OPTIC: CPT | Performed by: NURSE PRACTITIONER

## 2019-04-15 RX ORDER — ECHINACEA PURPUREA EXTRACT 125 MG
1 TABLET ORAL PRN
Qty: 1 BOTTLE | Refills: 3 | Status: SHIPPED | OUTPATIENT
Start: 2019-04-15 | End: 2019-08-13

## 2019-04-15 RX ORDER — BENZONATATE 100 MG/1
100 CAPSULE ORAL 3 TIMES DAILY PRN
Qty: 60 CAP | Refills: 0 | Status: SHIPPED | OUTPATIENT
Start: 2019-04-15 | End: 2019-05-06

## 2019-04-15 RX ORDER — MIRTAZAPINE 15 MG/1
TABLET, FILM COATED ORAL
Refills: 0 | COMMUNITY
Start: 2019-03-29 | End: 2019-09-10

## 2019-04-15 RX ORDER — LAMOTRIGINE 300 MG/1
TABLET, EXTENDED RELEASE ORAL
COMMUNITY
End: 2019-09-10

## 2019-04-15 RX ORDER — CYCLOBENZAPRINE HCL 10 MG
TABLET ORAL
Refills: 2 | COMMUNITY
Start: 2019-03-22 | End: 2022-06-29

## 2019-04-15 RX ORDER — DOCUSATE SODIUM 250 MG/1
CAPSULE, LIQUID FILLED ORAL
Refills: 5 | COMMUNITY
Start: 2019-03-22 | End: 2021-10-28

## 2019-04-15 RX ORDER — ESOMEPRAZOLE MAGNESIUM 20 MG/1
20 GRANULE, DELAYED RELEASE ORAL DAILY
Qty: 90 EACH | Refills: 3 | Status: SHIPPED | OUTPATIENT
Start: 2019-04-15 | End: 2020-09-02

## 2019-04-15 RX ORDER — BENZOCAINE/MENTHOL 6 MG-10 MG
LOZENGE MUCOUS MEMBRANE
Qty: 30 G | Refills: 3 | Status: SHIPPED | OUTPATIENT
Start: 2019-04-15 | End: 2019-09-10 | Stop reason: SDUPTHER

## 2019-04-15 RX ORDER — ALBUTEROL SULFATE 90 UG/1
2 AEROSOL, METERED RESPIRATORY (INHALATION) EVERY 6 HOURS PRN
Qty: 6.7 G | Refills: 2 | Status: SHIPPED | OUTPATIENT
Start: 2019-04-15 | End: 2019-08-13 | Stop reason: SDUPTHER

## 2019-04-15 RX ORDER — BUSPIRONE HYDROCHLORIDE 10 MG/1
TABLET ORAL
Refills: 0 | COMMUNITY
Start: 2019-03-29

## 2019-04-15 RX ORDER — LISINOPRIL AND HYDROCHLOROTHIAZIDE 20; 12.5 MG/1; MG/1
1 TABLET ORAL DAILY
Qty: 90 TAB | Refills: 2 | Status: SHIPPED | OUTPATIENT
Start: 2019-04-15 | End: 2019-09-10

## 2019-04-15 NOTE — ASSESSMENT & PLAN NOTE
This is a chronic problem.  Patient denies SI/HI.  She has been seeing [facility].  Recently had her medications changed, diagnosis of bipolar 2 to major depressive disorder.  She is now currently taking BuSpar and Lamictal.  Patient reports stable mood.  She does need a therapist, requests a referral.

## 2019-04-15 NOTE — ASSESSMENT & PLAN NOTE
This is a chronic problem.  In the past she has not taken medication for control, she is due for repeat labs.

## 2019-04-15 NOTE — ASSESSMENT & PLAN NOTE
This is a chronic problem, patient's most recent A1c of 6.1.  She denies at this time any polyuria or polydipsia.  She has been working on her diet.  Due for repeat labs.

## 2019-04-15 NOTE — PATIENT INSTRUCTIONS
Your medical care was provided today by: GELY Treviño    Thank You for the opportunity to serve you.    You may receive a brief survey in the mail shortly regarding your visit today. Please take a few moments to complete the survey and return it; no postage is necessary. We are working to serve our patient population better, improve customer service and our patients overall experience and your input can help us to accomplish this. We thank you for your help and for the opportunity to serve you today and in the future.     Labs and Diagnostic Testing   Please note that if we have ordered labs or diagnostic testing, those results may be released to you on [employer] prior to my review. While we do our best to review your results as soon as possible, there are times when you may see your results prior to provider review. Of course, if you ever have any questions or concerns about your results, please contact our clinic.     Special Instructions:  Always call 9-1-1 immediately if you develop a life threatening emergency.    Unless told otherwise please take all medications as directed and complete prescription therapies.     Watch for the following signs that require additional evaluation: progressive lethargy or unresponsiveness, localized pain (chest, abdomen), shortness of breath, painful breathing, progressive vomiting with weakness, bloody stools, or new rash.     If you are prescribed pain medication or any other medication that is sedating, do not take medication before or while operating a vehicle or heavy machinery or equipment due to potential side effects such as drowsiness and/or dizziness.

## 2019-04-16 DIAGNOSIS — J30.89 CHRONIC NON-SEASONAL ALLERGIC RHINITIS: ICD-10-CM

## 2019-04-16 NOTE — TELEPHONE ENCOUNTER
Was the patient seen in the last year in this department? Yes 4/15/19    Does patient have an active prescription for medications requested? Yes on the zyrtec and no the nasal spray    Received Request Via: Patient     Pt called and left a VM said she need her zyrtec and nasal spray sent over to the Pharmacy. Try to call and see what nasal spray she was asking for but Pt did not answer ans VM not Set up.

## 2019-04-17 RX ORDER — CETIRIZINE HYDROCHLORIDE 10 MG/1
10 TABLET ORAL
Qty: 90 TAB | Refills: 2 | Status: SHIPPED | OUTPATIENT
Start: 2019-04-17 | End: 2020-01-09

## 2019-05-06 ENCOUNTER — OFFICE VISIT (OUTPATIENT)
Dept: MEDICAL GROUP | Facility: CLINIC | Age: 59
End: 2019-05-06
Payer: MEDICAID

## 2019-05-06 VITALS
TEMPERATURE: 97.5 F | DIASTOLIC BLOOD PRESSURE: 82 MMHG | OXYGEN SATURATION: 89 % | HEIGHT: 64 IN | WEIGHT: 243 LBS | BODY MASS INDEX: 41.48 KG/M2 | SYSTOLIC BLOOD PRESSURE: 136 MMHG | HEART RATE: 105 BPM | RESPIRATION RATE: 14 BRPM

## 2019-05-06 DIAGNOSIS — J22 LOWER RESP. TRACT INFECTION: ICD-10-CM

## 2019-05-06 DIAGNOSIS — R05.9 COUGH: ICD-10-CM

## 2019-05-06 DIAGNOSIS — G47.00 INSOMNIA, UNSPECIFIED TYPE: ICD-10-CM

## 2019-05-06 DIAGNOSIS — E66.01 MORBID OBESITY WITH BMI OF 40.0-44.9, ADULT (HCC): ICD-10-CM

## 2019-05-06 PROCEDURE — 99214 OFFICE O/P EST MOD 30 MIN: CPT | Performed by: NURSE PRACTITIONER

## 2019-05-06 RX ORDER — DOXYCYCLINE HYCLATE 100 MG
100 TABLET ORAL 2 TIMES DAILY
Qty: 10 TAB | Refills: 0 | Status: SHIPPED | OUTPATIENT
Start: 2019-05-06 | End: 2019-05-11

## 2019-05-06 RX ORDER — METHYLPREDNISOLONE 4 MG/1
TABLET ORAL
Qty: 21 TAB | Refills: 0 | Status: SHIPPED | OUTPATIENT
Start: 2019-05-06 | End: 2019-08-13

## 2019-05-06 RX ORDER — BENZONATATE 100 MG/1
100 CAPSULE ORAL 3 TIMES DAILY PRN
Qty: 60 CAP | Refills: 0 | Status: SHIPPED
Start: 2019-05-06 | End: 2020-01-07

## 2019-05-06 RX ORDER — AMOXICILLIN AND CLAVULANATE POTASSIUM 875; 125 MG/1; MG/1
1 TABLET, FILM COATED ORAL 2 TIMES DAILY
Qty: 14 TAB | Refills: 0 | Status: SHIPPED | OUTPATIENT
Start: 2019-05-06 | End: 2019-05-13

## 2019-05-06 NOTE — PATIENT INSTRUCTIONS
Your medical care was provided today by: GELY Treviño    Thank You for the opportunity to serve you.    You may receive a brief survey in the mail shortly regarding your visit today. Please take a few moments to complete the survey and return it; no postage is necessary. We are working to serve our patient population better, improve customer service and our patients overall experience and your input can help us to accomplish this. We thank you for your help and for the opportunity to serve you today and in the future.     Labs and Diagnostic Testing   Please note that if we have ordered labs or diagnostic testing, those results may be released to you on [employer] prior to my review. While we do our best to review your results as soon as possible, there are times when you may see your results prior to provider review. Of course, if you ever have any questions or concerns about your results, please contact our clinic.     Special Instructions:  Always call 9-1-1 immediately if you develop a life threatening emergency.    Unless told otherwise please take all medications as directed and complete prescription therapies.     Watch for the following signs that require additional evaluation: progressive lethargy or unresponsiveness, localized pain (chest, abdomen), shortness of breath, painful breathing, progressive vomiting with weakness, bloody stools, or new rash.     If you are prescribed pain medication or any other medication that is sedating, do not take medication before or while operating a vehicle or heavy machinery or equipment due to potential side effects such as drowsiness and/or dizziness.

## 2019-05-06 NOTE — PROGRESS NOTES
Subjective:     Denisse Ramirez is a 58 y.o. female here today for new onset upper respiratory symptoms     This is a new problem.   Symptoms include   Cough   Congestion   Sore throat   Sinus pressure   Ear pain        Patient denies the following symptoms:  SOB   Nausea/Vomiting   Diarrhea   Chills/body aches     Onset 3-4 weeks    Progression since onset: worsening    Fever: No   Treatments tried: nasal solution    Family members/coworker sick with similar symptoms: no    Smoker status:   History   Smoking Status   • Former Smoker   • Packs/day: 1.00   • Years: 35.00   • Types: Cigarettes   • Quit date: 4/17/2018   Smokeless Tobacco   • Never Used       Insomnia  This is a chronic problem that is not well controlled at this time. Patient is taking amitriptyline 100 mg at bedtime. She states she continues to have difficulty staying asleep.  She has never had a sleep study.       Current medicines (including changes today)  Current Outpatient Prescriptions   Medication Sig Dispense Refill   • MethylPREDNISolone (MEDROL DOSEPAK) 4 MG Tablet Therapy Pack As directed on the packaging label. 21 Tab 0   • amoxicillin-clavulanate (AUGMENTIN) 875-125 MG Tab Take 1 Tab by mouth 2 times a day for 7 days. 14 Tab 0   • doxycycline (VIBRAMYCIN) 100 MG Tab Take 1 Tab by mouth 2 times a day for 5 days. 10 Tab 0   • benzonatate (TESSALON) 100 MG Cap Take 1 Cap by mouth 3 times a day as needed for Cough. 60 Cap 0   • cetirizine (ZYRTEC) 10 MG Tab Take 1 Tab by mouth every day. 90 Tab 2   • cyclobenzaprine (FLEXERIL) 10 MG Tab   2   •  MG capsule   5   • mirtazapine (REMERON) 15 MG Tab   0   • busPIRone (BUSPAR) 10 MG Tab tablet   0   • LamoTRIgine 300 MG TABLET SR 24 HR Take  by mouth.     • lisinopril-hydrochlorothiazide (PRINZIDE, ZESTORETIC) 20-12.5 MG per tablet Take 1 Tab by mouth every day. 90 Tab 2   • Esomeprazole Magnesium (NEXIUM) 20 MG Pack Take 20 mg by mouth every day. 90 Each 3   • hydrocortisone 1 %  Cream APPLY TOPICALLY TO AFFECTED AREAS TWO TIMES A DAY FOR 10 DAYS 30 g 3   • albuterol 108 (90 Base) MCG/ACT Aero Soln inhalation aerosol Inhale 2 Puffs by mouth every 6 hours as needed for Shortness of Breath. 6.7 g 2   • sodium chloride (OCEAN) 0.65 % Solution Spray 1 Spray in nose as needed for Congestion. 1 Bottle 3   • meloxicam (MOBIC) 15 MG tablet TAKE ONE TABLET BY MOUTH DAILY 30 Tab 2   • hydrocodone/acetaminophen (NORCO)  MG Tab Take 1-2 Tabs by mouth every 6 hours as needed.       No current facility-administered medications for this visit.        She  has a past medical history of Anxiety; Deep vein blood clot of right lower extremity (HCC); Depression; Hypertension; and Insomnia.    She  has a past surgical history that includes tonsillectomy and abdominal exploration.     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   No rash.   No eye redness, eye pain.   Negative for SOB, wheezing.    No chest pain, palpitations, dizziness.   No abdominal pain.     All other systems reviewed and are negative.        Objective:     /82 (BP Location: Left arm, Patient Position: Sitting, BP Cuff Size:  "Large adult)   Pulse (!) 105   Temp 36.4 °C (97.5 °F)   Resp 14   Ht 1.626 m (5' 4\")   Wt 110.2 kg (243 lb)   SpO2 89%  Body mass index is 41.71 kg/m².    Physical Exam:   Constitutional: Alert, no distress. Patient not toxic or lethargic.   Eye: Equal, round and reactive, conjunctiva clear, lids normal.   ENMT: Lips without lesions, oropharynx clear.   TMs normal, without erythema.   No nasal drainage, normal appearance of external nose.   mild pain with palpation of sinuses   Neck: Trachea midline, no masses. No cervical or supraclavicular lymphadenopathy  Respiratory: Unlabored respiratory effort, lungs clear to auscultation, no wheezes, no ronchi.  Cardiovascular: Normal S1, S2, no murmur, no edema.   Abdomen: Soft, non-tender, no masses, no hepatosplenomegaly. Normal bowel sounds.   Skin: Warm, dry, good turgor, no rashes in visible areas.  Psych: Alert and oriented x3, normal affect and mood.        Assessment and Plan:   The following treatment plan was discussed    1. Lower resp. tract infection  Patient has been complaining of upper respiratory symptoms for quite some time.  Her oxygen level is mildly decreased today.  I have offered her in the past and even now chest x-ray, unfortunately she reports she is unable to do so due to lack of transportation to [place].  Given her presentation today, will treat with steroid, and antibiotics which would provide coverage for sinus infection and/or lower respiratory tract infection.  I discussed with patient very strict emergency room precautions.  She will follow-up with me in 1 week to ensure some resolution.  - MethylPREDNISolone (MEDROL DOSEPAK) 4 MG Tablet Therapy Pack; As directed on the packaging label.  Dispense: 21 Tab; Refill: 0  - amoxicillin-clavulanate (AUGMENTIN) 875-125 MG Tab; Take 1 Tab by mouth 2 times a day for 7 days.  Dispense: 14 Tab; Refill: 0  - doxycycline (VIBRAMYCIN) 100 MG Tab; Take 1 Tab by mouth 2 times a day for 5 days.  " Dispense: 10 Tab; Refill: 0    2. Cough  Patient complains of cough at night, unable to sleep.  She is followed by pain management so advised I will not prescribe codeine, at this time will use Tessalon.  She is amenable to this plan.  - benzonatate (TESSALON) 100 MG Cap; Take 1 Cap by mouth 3 times a day as needed for Cough.  Dispense: 60 Cap; Refill: 0    3. Morbid obesity with BMI of 40.0-44.9, adult ([facility])  - REFERRAL TO SLEEP STUDIES    4. Insomnia, unspecified type  Given her history of lack of sleep, and also her most recent consult with [facility], will order sleep study.  - REFERRAL TO SLEEP STUDIES       Advised patient to rest, increase fluids   Sinus rinse as needed for congestion   Salt water gargle for sore throat as needed   Discussed s/s to seek emergent care.  RTC if symptoms worsen or do not resolve.      Discussed with patient to follow-up with me next week, I did advise patient of my pending departure from clinic.  She is aware that beyond next week likely she will need to follow-up with a new primary care provider.    Reviewed indication, dosage, usage and potential adverse effects of prescribed medications. Patient appears to understand, verbalizes understanding and is willing to try medications as prescribed.      Reviewed risks and benefits of treatment plan. Patient verbally agrees to plan of care.       Followup: Return in about 1 week (around 5/13/2019), or if symptoms worsen or fail to improve.    TORI Acevedo.     PLEASE NOTE: This dictation was created using voice recognition software. I have made every reasonable attempt to correct obvious errors, but I expect that there may be errors of grammar and possibly content that I did not discover prior finalizing this note.

## 2019-05-06 NOTE — ASSESSMENT & PLAN NOTE
This is a chronic problem that is not well controlled at this time. Patient is taking amitriptyline 100 mg at bedtime. She states she continues to have difficulty staying asleep.  She has never had a sleep study.

## 2019-06-04 ENCOUNTER — OFFICE VISIT (OUTPATIENT)
Dept: MEDICAL GROUP | Facility: CLINIC | Age: 59
End: 2019-06-04
Payer: MEDICAID

## 2019-06-04 VITALS
HEIGHT: 65 IN | OXYGEN SATURATION: 96 % | DIASTOLIC BLOOD PRESSURE: 78 MMHG | TEMPERATURE: 97.4 F | SYSTOLIC BLOOD PRESSURE: 132 MMHG | RESPIRATION RATE: 16 BRPM | BODY MASS INDEX: 40.48 KG/M2 | WEIGHT: 243 LBS | HEART RATE: 109 BPM

## 2019-06-04 DIAGNOSIS — G47.00 INSOMNIA, UNSPECIFIED TYPE: ICD-10-CM

## 2019-06-04 DIAGNOSIS — I10 ESSENTIAL HYPERTENSION: ICD-10-CM

## 2019-06-04 DIAGNOSIS — R00.2 PALPITATIONS: ICD-10-CM

## 2019-06-04 PROBLEM — R05.9 COUGH: Status: RESOLVED | Noted: 2019-04-15 | Resolved: 2019-06-04

## 2019-06-04 PROCEDURE — 99214 OFFICE O/P EST MOD 30 MIN: CPT | Performed by: NURSE PRACTITIONER

## 2019-06-04 NOTE — ASSESSMENT & PLAN NOTE
This is a chronic problem which has been well controlled with Lisiopril/HTCZ. Patient denies chest pain, SOB, palpitations. Denies unilateral swelling or pain.     Patient reports over the last month or so she has had an increased pulse.  She comes today to request a referral to cardiology, with José Miguel Doan.  Denies any chest pain.    It does appear that in April 2018, patient had a minimally abnormal EKG, at the time a echo and stress test were ordered but unfortunately it appears that patient did not follow through with that referral?

## 2019-06-04 NOTE — PATIENT INSTRUCTIONS
Your medical care was provided today by: GELY Treviño    Thank You for the opportunity to serve you.    You may receive a brief survey in the mail shortly regarding your visit today. Please take a few moments to complete the survey and return it; no postage is necessary. We are working to serve our patient population better, improve customer service and our patients overall experience and your input can help us to accomplish this. We thank you for your help and for the opportunity to serve you today and in the future.     Labs and Diagnostic Testing   Please note that if we have ordered labs or diagnostic testing, those results may be released to you on writewitht prior to my review. While we do our best to review your results as soon as possible, there are times when you may see your results prior to provider review. Of course, if you ever have any questions or concerns about your results, please contact our clinic.     Special Instructions:  Always call 9-1-1 immediately if you develop a life threatening emergency.    Unless told otherwise please take all medications as directed and complete prescription therapies.     Watch for the following signs that require additional evaluation: progressive lethargy or unresponsiveness, localized pain (chest, abdomen), shortness of breath, painful breathing, progressive vomiting with weakness, bloody stools, or new rash.     If you are prescribed pain medication or any other medication that is sedating, do not take medication before or while operating a vehicle or heavy machinery or equipment due to potential side effects such as drowsiness and/or dizziness.

## 2019-06-04 NOTE — ASSESSMENT & PLAN NOTE
This is a chronic problem that is not well controlled at this time. Patient is taking amitriptyline 100 mg at bedtime. She states she continues to have difficulty staying asleep.  She has never had a sleep study.    Patient reports since her last visit she is currently scheduled with sleep medicine via telemedicine next week.

## 2019-06-04 NOTE — PROGRESS NOTES
Subjective:     Denisse Ramirez is a 58 y.o. female here today for evaluation and management of the following:    Denisse reports since her last visit regarding her lower respiratory infection she has felt much better although still minimally fatigued.  Reports she still has some ear congestion but overall feeling better.    Essential hypertension  This is a chronic problem which has been well controlled with Lisiopril/HTCZ. Patient denies chest pain, SOB, palpitations. Denies unilateral swelling or pain.     Patient reports over the last month or so she has had an increased pulse.  She comes today to request a referral to cardiology, with José Miguel Doan.  Denies any chest pain.    It does appear that in April 2018, patient had a minimally abnormal EKG, at the time a echo and stress test were ordered but unfortunately it appears that patient did not follow through with that referral?    Insomnia  This is a chronic problem that is not well controlled at this time. Patient is taking amitriptyline 100 mg at bedtime. She states she continues to have difficulty staying asleep.  She has never had a sleep study.    Patient reports since her last visit she is currently scheduled with sleep medicine via telemedicine next week.       Current medicines (including changes today)  Current Outpatient Prescriptions   Medication Sig Dispense Refill   • MethylPREDNISolone (MEDROL DOSEPAK) 4 MG Tablet Therapy Pack As directed on the packaging label. 21 Tab 0   • benzonatate (TESSALON) 100 MG Cap Take 1 Cap by mouth 3 times a day as needed for Cough. 60 Cap 0   • cetirizine (ZYRTEC) 10 MG Tab Take 1 Tab by mouth every day. 90 Tab 2   • cyclobenzaprine (FLEXERIL) 10 MG Tab   2   •  MG capsule   5   • mirtazapine (REMERON) 15 MG Tab   0   • busPIRone (BUSPAR) 10 MG Tab tablet   0   • LamoTRIgine 300 MG TABLET SR 24 HR Take  by mouth.     • lisinopril-hydrochlorothiazide (PRINZIDE, ZESTORETIC) 20-12.5 MG per tablet Take 1 Tab by  "mouth every day. 90 Tab 2   • Esomeprazole Magnesium (NEXIUM) 20 MG Pack Take 20 mg by mouth every day. 90 Each 3   • hydrocortisone 1 % Cream APPLY TOPICALLY TO AFFECTED AREAS TWO TIMES A DAY FOR 10 DAYS 30 g 3   • albuterol 108 (90 Base) MCG/ACT Aero Soln inhalation aerosol Inhale 2 Puffs by mouth every 6 hours as needed for Shortness of Breath. 6.7 g 2   • sodium chloride (OCEAN) 0.65 % Solution Spray 1 Spray in nose as needed for Congestion. 1 Bottle 3   • meloxicam (MOBIC) 15 MG tablet TAKE ONE TABLET BY MOUTH DAILY 30 Tab 2   • hydrocodone/acetaminophen (NORCO)  MG Tab Take 1-2 Tabs by mouth every 6 hours as needed.       No current facility-administered medications for this visit.        She  has a past medical history of Anxiety; Deep vein blood clot of right lower extremity (HCC); Depression; Hypertension; and Insomnia.    She  has a past surgical history that includes tonsillectomy and abdominal exploration.    Tetanus toxoids      ROS  Positive for bilateral ear congestion, no drainage.  Positive for intermittent palpitations.  None in clinic today.  No fever, no vision changes, no headache, no dizziness, no chest pain, no edema, no shortness of breath, no abdominal pain, no rashes, no sores     All other systems reviewed and are negative.        Objective:     /78 (BP Location: Left arm, Patient Position: Sitting, BP Cuff Size: Large adult)   Pulse (!) 109   Temp 36.3 °C (97.4 °F) (Temporal)   Resp 16   Ht 1.638 m (5' 4.5\")   Wt 110.2 kg (243 lb)   SpO2 96%  Body mass index is 41.07 kg/m².    Physical Exam:   Constitutional: Alert, no distress.  Eye: Equal, round and reactive, conjunctiva clear, lids normal.  ENMT: Lips without lesions, poor dentition, oropharynx clear, no exudate.  TMs appear with minimal congestion, no signs or symptoms or infection.  Neck: Trachea midline, no masses, no thyromegaly. No cervical or supraclavicular lymphadenopathy  Respiratory: Unlabored respiratory " effort, lungs clear to auscultation, no wheezes, no ronchi.  Cardiovascular: Normal S1, S2, no murmur, no edema.   Abdomen: Soft, non-tender, no masses, no hepatosplenomegaly.  Skin: Warm, dry, good turgor, no rashes in visible areas.  Neuro: normal sensation in extremities.   Psych: Alert and oriented x3, normal affect and mood.      Assessment and Plan:   The following treatment plan was discussed    1. Essential hypertension  Discussed with patient the importance of follow-up and she will follow-up with cardiology, I placed a referral for her today.  I did discuss with patient signs or symptoms to seek more emergent care.  She is aware that she will follow-up with a new primary care provider.    2. Palpitations  - REFERRAL TO CARDIOLOGY    3. Insomnia, unspecified type  Encouraged to keep her sleep medicine appointment, and she will follow-up post her appointment with a new primary care provider to discuss follow-up.    Encourage patient to stay adequately hydrated.  Reviewed risks and benefits of treatment plan. Patient verbally agrees to plan of care.       Followup: Return in about 2 months (around 8/4/2019) for follow up with new PCP .    TORI Acevedo.     PLEASE NOTE: This dictation was created using voice recognition software. I have made every reasonable attempt to correct obvious errors, but I expect that there may be errors of grammar and possibly content that I did not discover prior finalizing this note.

## 2019-07-29 NOTE — PROGRESS NOTES
CC: ***    HPI:    Ms. Denisse Ramirez is a 58-year-old female from [place] kindly referred by GELY Merino for evaluation of sleep complaints.      Significant comorbidities modifying factors include morbid obesity, hypertension, bipolar, prediabetes, former smoker,        Symptom Summary:  Snoring: +  Very loud snoring: +  Witnessed apneas: +  Resuscitative snorts: +  Nocturnal shortness of breath: +  Non-restorative sleep: +  Insomnia: +  Nocturnal awakenings: +  Nocturia: +  EDS: +  Fatigue: +  Tiredness: +  Falls asleep accidentally: +   Napping or returning to bed after arising: +  Restless legs: -  Limb movements during sleep: +  Nocturnal headaches: +    The patient's sleep questionnaire, sleep diary, ESS, and spousal report were all personally reviewed.    Significant comorbidities and modifying factors include ***.    Location, quality, severity, duration, timing, context, modifying factors, and associated signs/symptoms.      Patient Active Problem List    Diagnosis Date Noted   • Essential hypertension 04/10/2018   • Hot flashes due to menopause 04/10/2018   • Bilateral lower extremity edema 04/10/2018   • History of methamphetamine use 04/10/2018   • Uncomplicated opioid dependence ([facility]) 04/10/2018   • Abnormal EKG 04/10/2018   • Pain management contract agreement 01/05/2018   • Chronic non-seasonal allergic rhinitis 01/05/2018   • Decreased hearing of both ears 01/05/2018   • Vaginal pain 06/27/2017   • Prediabetes 04/20/2017   • Mild hyperlipidemia 04/20/2017   • Abnormal drug screen 04/20/2017   • Stress at home 04/20/2017   • Morbid obesity with BMI of 40.0-44.9, adult ([facility]) 03/30/2017   • History of tobacco use disorder 03/30/2017   • Rash 03/30/2017   • Pain of both hip joints 02/01/2017   • Chronic right shoulder pain 02/01/2017   • Bipolar 2 disorder, major depressive episode ([facility]) 05/03/2016   • Insomnia 05/03/2016   • Bilateral low back pain with right-sided sciatica  03/04/2016   • Anxiety 03/04/2016       Past Medical History:   Diagnosis Date   • Anxiety    • Deep vein blood clot of right lower extremity (HCC)     2003 due to birth control patch.   • Depression    • Hypertension    • Insomnia         Past Surgical History:   Procedure Laterality Date   • ABDOMINAL EXPLORATION     • TONSILLECTOMY         Family History   Problem Relation Age of Onset   • Lung Disease Mother    • Cancer Father    • Heart Disease Father    • Lung Disease Brother    • Lung Disease Brother        Social History     Social History   • Marital status:      Spouse name: N/A   • Number of children: N/A   • Years of education: N/A     Occupational History   • Not on file.     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 21 Tab 0   • benzonatate (TESSALON) 100 MG Cap Take 1 Cap by mouth 3 times a day as needed for Cough. 60 Cap 0   • cetirizine (ZYRTEC) 10 MG Tab Take 1 Tab by mouth every day. 90 Tab 2   • cyclobenzaprine (FLEXERIL) 10 MG Tab   2   •  MG capsule   5   • mirtazapine (REMERON) 15 MG Tab   0   • busPIRone (BUSPAR) 10 MG Tab tablet   0   • LamoTRIgine 300 MG TABLET SR 24 HR Take  by mouth.     • lisinopril-hydrochlorothiazide (PRINZIDE, ZESTORETIC) 20-12.5 MG per tablet Take 1 Tab by mouth every day. 90 Tab 2   • Esomeprazole Magnesium (NEXIUM) 20 MG Pack Take 20 mg by mouth every day. 90 Each 3   • hydrocortisone 1 % Cream APPLY  "TOPICALLY TO AFFECTED AREAS TWO TIMES A DAY FOR 10 DAYS 30 g 3   • albuterol 108 (90 Base) MCG/ACT Aero Soln inhalation aerosol Inhale 2 Puffs by mouth every 6 hours as needed for Shortness of Breath. 6.7 g 2   • sodium chloride (OCEAN) 0.65 % Solution Spray 1 Spray in nose as needed for Congestion. 1 Bottle 3   • meloxicam (MOBIC) 15 MG tablet TAKE ONE TABLET BY MOUTH DAILY 30 Tab 2   • hydrocodone/acetaminophen (NORCO)  MG Tab Take 1-2 Tabs by mouth every 6 hours as needed.       No current facility-administered medications for this visit.     \"CURRENT RX\"    ALLERGIES: Tetanus toxoids    ROS  ***  Constitutional: Denies fever, chills, sweats,  weight loss, fatigue.  Eyes: Denies vision loss, pain, drainage, double vision, glasses.  Ears/Nose/Mouth/Throat: Denies earache, difficulty hearing, rhinitis/nasal congestion, injury, recurrent sore throat, persistent hoarseness, decayed teeth/toothaches, ringing or buzzing in the ears.  Cardiovascular: Denies chest pain, tightness, palpitations, swelling in legs/feet, fainting, difficulty breathing when lying down but gets better when sitting up.   Respiratory: Denies shortness of breath, cough, sputum, wheezing, painful breathing, coughing up blood.   Sleep: per HPI  Gastrointestinal: Denies  difficulty swallowing, nausea, abdominal pain, diarrhea, constipation, heartburn.  Genitourinary: Denies  blood in urine, discharge, frequent urination.   Musculoskeletal: Denies painful joints, sore muscles, back pain.   Integumentary: Denies rashes, lumps, color changes.   Neurological: Denies frequent headaches,weakness, dizziness.    PHYSICAL EXAM  ***    There were no vitals taken for this visit.  Appearance: Well-nourished, well-developed, no acute distress  Eyes:  PERRLA, EOMI  ENMT: without lesions, deformities;hearing grossly intact; tongue normal, posterior pharynx without erythema or exudate; Mallampati classification:   Neck: Supple, trachea midline, no " masses  Respiratory effort:  No intercostal retractions or use of accessory muscles  Lung auscultation:  No wheezes rhonchi rubs or rales  Cardiac: No murmurs, rubs, or gallops; regular rhythm, normal rate; no edema  Abdomen:  No tenderness, no organomegaly  Musculoskeletal:  Grossly normal; gait and station normal; digits and nails normal  Skin:  No rashes, petechiae, cyanosis  Neurologic: without focal signs; oriented to person, time, place, and purpose; judgement intact  Psychiatric:  No depression, anxiety, agitation        PROBLEMS:  ***  There are no diagnoses linked to this encounter.    PLAN:   The patient has signs and symptoms consistent with obstructive sleep apnea hypopnea syndrome. Will schedule to have a nocturnal polysomnogram using zolpidem to assist with sleep onset and maintenance should the need arise. Will return after the results are available to determine further diagnostic needs and/or treatment options.    The risks of untreated sleep apnea were discussed with the patient at length. Patients with SHANTI are at increased risk of cardiovascular disease including coronary artery disease, systemic arterial hypertension, pulmonary arterial hypertension, cardiac arrythmias, and stroke. SHANTI patients have an increased risk of motor vehicle accidents, type 2 diabetes, chronic kidney disease, and non-alcoholic liver disease. The patient was advised to avoid driving a motor vehicle when drowsy.    Positive airway pressure, such as CPAP, is considered first-line and preferred therapy for sleep apnea and may reverse both symptoms and risks.    Have advised the patient to follow up with the appropriate healthcare practitioners for all other medical problems and issues.        ***  No Follow-up on file.

## 2019-07-30 ENCOUNTER — APPOINTMENT (OUTPATIENT)
Dept: SCHEDULING | Facility: IMAGING CENTER | Age: 59
End: 2019-07-30
Payer: MEDICAID

## 2019-08-13 ENCOUNTER — OFFICE VISIT (OUTPATIENT)
Dept: MEDICAL GROUP | Facility: CLINIC | Age: 59
End: 2019-08-13
Payer: MEDICAID

## 2019-08-13 VITALS
BODY MASS INDEX: 39.32 KG/M2 | OXYGEN SATURATION: 97 % | DIASTOLIC BLOOD PRESSURE: 90 MMHG | HEIGHT: 65 IN | TEMPERATURE: 97.5 F | WEIGHT: 236 LBS | SYSTOLIC BLOOD PRESSURE: 142 MMHG | RESPIRATION RATE: 12 BRPM | HEART RATE: 100 BPM

## 2019-08-13 DIAGNOSIS — E66.9 OBESITY (BMI 30-39.9): ICD-10-CM

## 2019-08-13 DIAGNOSIS — I10 ESSENTIAL HYPERTENSION: ICD-10-CM

## 2019-08-13 DIAGNOSIS — R94.31 ABNORMAL EKG: ICD-10-CM

## 2019-08-13 DIAGNOSIS — Z28.20 VACCINE REFUSED BY PATIENT: ICD-10-CM

## 2019-08-13 DIAGNOSIS — L03.115 CELLULITIS OF RIGHT LOWER EXTREMITY: ICD-10-CM

## 2019-08-13 DIAGNOSIS — F43.9 STRESS AT HOME: ICD-10-CM

## 2019-08-13 DIAGNOSIS — F42.4 PICKING OWN SKIN: ICD-10-CM

## 2019-08-13 DIAGNOSIS — Z12.11 SCREENING FOR COLON CANCER: ICD-10-CM

## 2019-08-13 PROBLEM — E66.01 MORBID OBESITY WITH BMI OF 40.0-44.9, ADULT (HCC): Status: RESOLVED | Noted: 2017-03-30 | Resolved: 2019-08-13

## 2019-08-13 PROCEDURE — 99214 OFFICE O/P EST MOD 30 MIN: CPT | Performed by: NURSE PRACTITIONER

## 2019-08-13 RX ORDER — HYDROXYZINE HYDROCHLORIDE 25 MG/1
TABLET, FILM COATED ORAL
Refills: 2 | COMMUNITY
Start: 2019-07-05 | End: 2019-08-13 | Stop reason: SDUPTHER

## 2019-08-13 RX ORDER — ALBUTEROL SULFATE 90 UG/1
2 AEROSOL, METERED RESPIRATORY (INHALATION) EVERY 6 HOURS PRN
Qty: 6.7 G | Refills: 2 | Status: SHIPPED | OUTPATIENT
Start: 2019-08-13 | End: 2020-09-02 | Stop reason: SDUPTHER

## 2019-08-13 RX ORDER — CEPHALEXIN 500 MG/1
500 CAPSULE ORAL 3 TIMES DAILY
Qty: 21 CAP | Refills: 0 | Status: SHIPPED | OUTPATIENT
Start: 2019-08-13 | End: 2019-08-20

## 2019-08-13 RX ORDER — HYDROXYZINE HYDROCHLORIDE 25 MG/1
25-50 TABLET, FILM COATED ORAL 3 TIMES DAILY PRN
Qty: 90 TAB | Refills: 2 | Status: SHIPPED | OUTPATIENT
Start: 2019-08-13 | End: 2019-11-03 | Stop reason: SDUPTHER

## 2019-08-13 NOTE — ASSESSMENT & PLAN NOTE
Nephew committed suicide a couple months ago  She is starting to see a therapist at [facility] next week

## 2019-08-13 NOTE — PROGRESS NOTES
"Subjective:     Denisse Ramirez is a 58 y.o. female here today for evaluation and management of the following:    Abnormal EKG  Patient was seen by [facility], stress testing was normal, awaiting ECHO testing.  Per her report.     Stress at home  Nephew committed suicide a couple months ago  She is starting to see a therapist at [facility] next week     Cellulitis of right lower extremity  This is a new problem.  Patient continues to complain of all over itching and picking.  She does have a history of methamphetamine abuse, and also history of chronic opiate dependence.  Patient has a new area on the back right part of her lower extremity which has some redness, reports minimal pain, no swelling.  No drainage.    Picking own skin  This is a chronic problem.  Patient continually complains that she is being \"bitten by something.  \"She is followed by psychiatry.  Patient also takes hydroxyzine 25 mg up to 3 times daily for anxiety although she is not really taken much in the past for itching although she does report it does help.       Current medicines (including changes today)  Current Outpatient Medications   Medication Sig Dispense Refill   • hydrOXYzine HCl (ATARAX) 25 MG Tab Take 1-2 Tabs by mouth 3 times a day as needed for Itching or Anxiety. 90 Tab 2   • albuterol 108 (90 Base) MCG/ACT Aero Soln inhalation aerosol Inhale 2 Puffs by mouth every 6 hours as needed for Shortness of Breath. 6.7 g 2   • cephALEXin (KEFLEX) 500 MG Cap Take 1 Cap by mouth 3 times a day for 7 days. 21 Cap 0   • benzonatate (TESSALON) 100 MG Cap Take 1 Cap by mouth 3 times a day as needed for Cough. 60 Cap 0   • cetirizine (ZYRTEC) 10 MG Tab Take 1 Tab by mouth every day. 90 Tab 2   • cyclobenzaprine (FLEXERIL) 10 MG Tab   2   •  MG capsule   5   • mirtazapine (REMERON) 15 MG Tab   0   • busPIRone (BUSPAR) 10 MG Tab tablet   0   • LamoTRIgine 300 MG TABLET SR 24 HR Take  by mouth.     • " lisinopril-hydrochlorothiazide (PRINZIDE, ZESTORETIC) 20-12.5 MG per tablet Take 1 Tab by mouth every day. 90 Tab 2   • Esomeprazole Magnesium (NEXIUM) 20 MG Pack Take 20 mg by mouth every day. 90 Each 3   • hydrocortisone 1 % Cream APPLY TOPICALLY TO AFFECTED AREAS TWO TIMES A DAY FOR 10 DAYS 30 g 3   • meloxicam (MOBIC) 15 MG tablet TAKE ONE TABLET BY MOUTH DAILY 30 Tab 2   • hydrocodone/acetaminophen (NORCO)  MG Tab Take 1-2 Tabs by mouth every 6 hours as needed.       No current facility-administered medications for this visit.        She  has a past medical history of Anxiety, Deep vein blood clot of right lower extremity (HCC), Depression, Hypertension, and Insomnia.    She  has a past surgical history that includes tonsillectomy and abdominal exploration.     Social History     Socioeconomic History   • Marital status:      Spouse name: Not on file   • Number of children: Not on file   • Years of education: Not on file   • Highest education level: Not on file   Occupational History   • Not on file   Social Needs   • Financial resource strain: Not on file   • Food insecurity:     Worry: Not on file     Inability: Not on file   • Transportation needs:     Medical: Not on file     Non-medical: Not on file   Tobacco Use   • Smoking status: Former Smoker     Packs/day: 1.00     Years: 35.00     Pack years: 35.00     Types: Cigarettes     Last attempt to quit: 2018     Years since quittin.3   • Smokeless tobacco: Never Used   Substance and Sexual Activity   • Alcohol use: Yes     Comment: 1 glass wine/mo   • Drug use: No     Comment: 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   • Sexual activity: Not on file   Lifestyle   • Physical activity:     Days per week: Not on file     Minutes per session: Not on file   • Stress: Not on file   Relationships   • Social connections:     Talks on phone: Not on file     Gets together: Not on file     Attends Hoah[employer]ism service: Not on file     Active member of  "club or organization: Not on file     Attends meetings of clubs or organizations: Not on file     Relationship status: Not on file   • Intimate partner violence:     Fear of current or ex partner: Not on file     Emotionally abused: Not on file     Physically abused: Not on file     Forced sexual activity: Not on file   Other Topics Concern   • Not on file   Social History Narrative   • Not on file       Family History   Problem Relation Age of Onset   • Lung Disease Mother    • Cancer Father    • Heart Disease Father    • Lung Disease Brother    • Lung Disease Brother          ROS  Positive for picking at skin, positive for skin infection right lower extremity.  No fever, no chest pain, no shortness of breath, no abdominal pain, no rashes    All other systems reviewed and are negative.        Objective:     /90 (BP Location: Left arm, Patient Position: Sitting, BP Cuff Size: Large adult)   Pulse 100   Temp 36.4 °C (97.5 °F) (Temporal)   Resp 12   Ht 1.638 m (5' 4.5\")   Wt 107 kg (236 lb)   SpO2 97%  Body mass index is 39.88 kg/m².    Physical Exam:   Constitutional: Alert, no distress.  Gait is normal, no assistive device.  Eye: Equal, round and reactive, conjunctiva clear, lids normal.   ENMT: Lips without lesions, oropharynx clear.   Neck: Trachea midline, no masses, no thyromegaly  Respiratory: Unlabored respiratory effort, lungs clear to auscultation, no wheezes, no ronchi.  Cardiovascular: Normal S1, S2, no murmur, no edema.   Abdomen: Soft, non-tender, no masses, no hepatosplenomegaly. Normal bowel sounds.   Skin: There is a small area on the back of her right lower extremity that does appear cellulitic, no drainage, minimal redness, approximately 2 x 1 inches in diameter.  Appears to be borderline cellulitic although patient has a history of cellulitis due to picking at one on skin.  Denies any fever or chills.  Neuro:  normal sensation in extremities.   Psych: Alert and oriented x3, normal " affect and mood.        Assessment and Plan:   The following treatment plan was discussed    1. Essential hypertension  Minimally elevated today, patient reports she is under a lot of stress.  She will follow-up in 3 weeks or sooner as needed, will plan for have a blood pressure check.  She has been stable on her current medication regime, lisinopril-hydrochlorothiazide 20-12.5 mg daily for some time.  She is currently being evaluated by cardiology, she has a echocardiogram upcoming, her EKG was minimally abnormal although her stress testing was normal.  Encourage patient to continue follow-up with cardiology.  We will follow-up here in 3 weeks for blood pressure check.    2. Abnormal EKG  See above    3. Stress at home  Provided reassurance, encouraged to see therapy next week as planned.    4. Cellulitis of right lower extremity  Discussed with patient signs or symptoms to seek emergent care. Advise please complete course of antibiotics unless otherwise directed by a healthcare professional. Take with food and adequate fluid intake.    - cephALEXin (KEFLEX) 500 MG Cap; Take 1 Cap by mouth 3 times a day for 7 days.  Dispense: 21 Cap; Refill: 0    5. Obesity (BMI 30-39.9)  Patient has lost a little bit of weight, advised patient to continue with diet and exercise change.  - Patient identified as having weight management issue.  Appropriate orders and counseling given.    6. Screening for colon cancer  Encourage patient to complete, she does still plan to complete her mammogram although she has not been able to schedule  - OCCULT BLOOD FECES IMMUNOASSAY (FIT); Future    7. Vaccine refused by patient  Declines all recommended vaccines    8. Picking own skin  See above, I have discussed with patient may take hydroxyzine 25 to 50 mg up to 3 times a day.  We did discuss the risks and benefits of this, encouraged to speak with her psychiatrist as well.  This may help with her itching, particularly at bedtime.  Discussed  possible side effects, to include increased sedation.  She verbalized understanding, has appointment with psychiatry within the month.      Reviewed indication, dosage, usage and potential adverse effects of prescribed medications. Patient appears to understand, verbalizes understanding and is willing to try medications as prescribed.      Reviewed risks and benefits of treatment plan. Patient verbally agrees to plan of care.       Followup: Return in about 3 weeks (around 9/3/2019) for BP follow up .    TORI Acevedo.     PLEASE NOTE: This dictation was created using voice recognition software. I have made every reasonable attempt to correct obvious errors, but I expect that there may be errors of grammar and possibly content that I did not discover prior finalizing this note.

## 2019-08-13 NOTE — ASSESSMENT & PLAN NOTE
This is a new problem.  Patient continues to complain of all over itching and picking.  She does have a history of methamphetamine abuse, and also history of chronic opiate dependence.  Patient has a new area on the back right part of her lower extremity which has some redness, reports minimal pain, no swelling.  No drainage.

## 2019-08-13 NOTE — PATIENT INSTRUCTIONS
Your medical care was provided today by: GELY Treviño    Thank You for the opportunity to serve you.    You may receive a brief survey in the mail shortly regarding your visit today. Please take a few moments to complete the survey and return it; no postage is necessary. We are working to serve our patient population better, improve customer service and our patients overall experience and your input can help us to accomplish this. We thank you for your help and for the opportunity to serve you today and in the future.     Labs and Diagnostic Testing   Please note that if we have ordered labs or diagnostic testing, those results may be released to you on [employer] prior to my review. While we do our best to review your results as soon as possible, there are times when you may see your results prior to provider review. Of course, if you ever have any questions or concerns about your results, please contact our clinic.     Special Instructions:  Always call 9-1-1 immediately if you develop a life threatening emergency.    Unless told otherwise please take all medications as directed and complete prescription therapies.     Watch for the following signs that require additional evaluation: progressive lethargy or unresponsiveness, localized pain (chest, abdomen), shortness of breath, painful breathing, progressive vomiting with weakness, bloody stools, or new rash.     If you are prescribed pain medication or any other medication that is sedating, do not take medication before or while operating a vehicle or heavy machinery or equipment due to potential side effects such as drowsiness and/or dizziness.

## 2019-08-13 NOTE — ASSESSMENT & PLAN NOTE
Patient was seen by Dr. Elias[facility], stress testing was normal, awaiting ECHO testing.  Per her report.

## 2019-08-13 NOTE — ASSESSMENT & PLAN NOTE
"This is a chronic problem.  Patient continually complains that she is being \"bitten by something.  \"She is followed by psychiatry.  Patient also takes hydroxyzine 25 mg up to 3 times daily for anxiety although she is not really taken much in the past for itching although she does report it does help.  "

## 2019-09-10 ENCOUNTER — OFFICE VISIT (OUTPATIENT)
Dept: MEDICAL GROUP | Facility: CLINIC | Age: 59
End: 2019-09-10
Payer: MEDICAID

## 2019-09-10 ENCOUNTER — HOSPITAL ENCOUNTER (OUTPATIENT)
Facility: MEDICAL CENTER | Age: 59
End: 2019-09-10
Attending: NURSE PRACTITIONER
Payer: MEDICAID

## 2019-09-10 VITALS
BODY MASS INDEX: 39.49 KG/M2 | TEMPERATURE: 97.8 F | DIASTOLIC BLOOD PRESSURE: 90 MMHG | HEIGHT: 65 IN | HEART RATE: 93 BPM | SYSTOLIC BLOOD PRESSURE: 144 MMHG | OXYGEN SATURATION: 94 % | RESPIRATION RATE: 16 BRPM | WEIGHT: 237 LBS

## 2019-09-10 DIAGNOSIS — N39.46 MIXED STRESS AND URGE URINARY INCONTINENCE: ICD-10-CM

## 2019-09-10 DIAGNOSIS — H91.93 DECREASED HEARING OF BOTH EARS: ICD-10-CM

## 2019-09-10 DIAGNOSIS — R10.2 VAGINAL PAIN: ICD-10-CM

## 2019-09-10 DIAGNOSIS — R39.89 SENSATION OF PRESSURE IN BLADDER AREA: ICD-10-CM

## 2019-09-10 DIAGNOSIS — I10 ESSENTIAL HYPERTENSION: ICD-10-CM

## 2019-09-10 DIAGNOSIS — Z11.59 ENCOUNTER FOR HEPATITIS C SCREENING TEST FOR LOW RISK PATIENT: ICD-10-CM

## 2019-09-10 DIAGNOSIS — Z28.20 VACCINE REFUSED BY PATIENT: ICD-10-CM

## 2019-09-10 DIAGNOSIS — F31.81 BIPOLAR 2 DISORDER, MAJOR DEPRESSIVE EPISODE (HCC): ICD-10-CM

## 2019-09-10 PROBLEM — L03.115 CELLULITIS OF RIGHT LOWER EXTREMITY: Status: RESOLVED | Noted: 2017-03-30 | Resolved: 2019-09-10

## 2019-09-10 LAB
APPEARANCE UR: CLEAR
BILIRUB UR STRIP-MCNC: NEGATIVE MG/DL
COLOR UR AUTO: YELLOW
GLUCOSE UR STRIP.AUTO-MCNC: NEGATIVE MG/DL
KETONES UR STRIP.AUTO-MCNC: NEGATIVE MG/DL
LEUKOCYTE ESTERASE UR QL STRIP.AUTO: NEGATIVE
NITRITE UR QL STRIP.AUTO: NEGATIVE
PH UR STRIP.AUTO: 6.5 [PH] (ref 5–8)
PROT UR QL STRIP: NEGATIVE MG/DL
RBC UR QL AUTO: NEGATIVE
SP GR UR STRIP.AUTO: 1.01
UROBILINOGEN UR STRIP-MCNC: 0.2 MG/DL

## 2019-09-10 PROCEDURE — 81002 URINALYSIS NONAUTO W/O SCOPE: CPT | Performed by: NURSE PRACTITIONER

## 2019-09-10 PROCEDURE — 87086 URINE CULTURE/COLONY COUNT: CPT

## 2019-09-10 PROCEDURE — 99214 OFFICE O/P EST MOD 30 MIN: CPT | Mod: 25 | Performed by: NURSE PRACTITIONER

## 2019-09-10 RX ORDER — LAMOTRIGINE 100 MG/1
TABLET ORAL
COMMUNITY
Start: 2019-09-02 | End: 2020-09-02

## 2019-09-10 RX ORDER — BENZOCAINE/MENTHOL 6 MG-10 MG
LOZENGE MUCOUS MEMBRANE
Qty: 30 G | Refills: 3 | Status: SHIPPED | OUTPATIENT
Start: 2019-09-10 | End: 2020-03-24 | Stop reason: SDUPTHER

## 2019-09-10 RX ORDER — LISINOPRIL AND HYDROCHLOROTHIAZIDE 25; 20 MG/1; MG/1
1 TABLET ORAL DAILY
Qty: 90 TAB | Refills: 0 | Status: SHIPPED | OUTPATIENT
Start: 2019-09-10 | End: 2019-12-06 | Stop reason: SDUPTHER

## 2019-09-10 RX ORDER — MIRTAZAPINE 30 MG/1
TABLET, FILM COATED ORAL
COMMUNITY
Start: 2019-09-08 | End: 2020-01-07

## 2019-09-10 RX ORDER — ESOMEPRAZOLE MAGNESIUM 20 MG
CAPSULE,DELAYED RELEASE (ENTERIC COATED) ORAL
COMMUNITY
Start: 2019-08-01 | End: 2020-01-07

## 2019-09-10 NOTE — ASSESSMENT & PLAN NOTE
This is a chronic problem which has been well controlled with Lisiopril/HTCZ in the past. Patient denies chest pain, SOB, palpitations. Denies unilateral swelling or pain.     She does have a history of abnormal EKG, she was referred to cardiology and as such all subsequent diagnostic testing has been normal.  She has not completed fasting labs although she plans to do so.  Her blood pressure is minimally elevated again today.  She reports sometimes she is having some minimal more swelling in her lower extremities, but it does resolve.

## 2019-09-10 NOTE — ASSESSMENT & PLAN NOTE
Patient continues to report that she has chronic hearing loss.  She requests referral today for audiology hearing testing.  She was referred previously but unfortunately was unable to follow-up.

## 2019-09-10 NOTE — ASSESSMENT & PLAN NOTE
This is a chronic problem.  Patient denies SI/HI.  She has been seeing [facility].  Recently had her medications changed, diagnosis of bipolar 2 to major depressive disorder.  She is now currently taking BuSpar and Lamictal, dosing has been recently changed, she reports positive changes.  Patient reports stable mood.

## 2019-09-10 NOTE — ASSESSMENT & PLAN NOTE
"This is a chronic problem.  Patient reports that she has not had intercourse in over 30 years due to dyspareunia.  She reports sometimes she feels lumps in her vagina and when she \"penetrates her with her fingers she has vaginal pain.  \"She denies any vaginal bleeding or discharge.  She has had 2 children vaginally without any reported complication previously.  She does have a history of UTI although her urinalysis today is normal.  "

## 2019-09-10 NOTE — PROGRESS NOTES
"Subjective:     Denisse Ramirez is a 58 y.o. female here today for evaluation management the following:    Bipolar 2 disorder, major depressive episode (HCC)  This is a chronic problem.  Patient denies SI/HI.  She has been seeing [facility].  Recently had her medications changed, diagnosis of bipolar 2 to major depressive disorder.  She is now currently taking BuSpar and Lamictal, dosing has been recently changed, she reports positive changes.  Patient reports stable mood.      Decreased hearing of both ears  Patient continues to report that she has chronic hearing loss.  She requests referral today for audiology hearing testing.  She was referred previously but unfortunately was unable to follow-up.    Essential hypertension  This is a chronic problem which has been well controlled with Lisiopril/HTCZ in the past. Patient denies chest pain, SOB, palpitations. Denies unilateral swelling or pain.     She does have a history of abnormal EKG, she was referred to cardiology and as such all subsequent diagnostic testing has been normal.  She has not completed fasting labs although she plans to do so.  Her blood pressure is minimally elevated again today.  She reports sometimes she is having some minimal more swelling in her lower extremities, but it does resolve.     Mixed stress and urge urinary incontinence  This is a chronic problem which patient reports she is been wearing a pad as needed.  Denies any dysuria.  She reports that sometimes she feels like \"something is there that is not supposed to be.  \"She continues to complain also of chronic vaginal pain.  She has not been evaluated by a gynecologist.    Vaginal pain  This is a chronic problem.  Patient reports that she has not had intercourse in over 30 years due to dyspareunia.  She reports sometimes she feels lumps in her vagina and when she \"penetrates her with her fingers she has vaginal pain.  \"She denies any vaginal bleeding or discharge.  " She has had 2 children vaginally without any reported complication previously.  She does have a history of UTI although her urinalysis today is normal.         Current medicines (including changes today)  Current Outpatient Medications   Medication Sig Dispense Refill   • lisinopril-hydrochlorothiazide (PRINZIDE, ZESTORETIC) 20-25 MG per tablet Take 1 Tab by mouth every day. 90 Tab 0   • hydrocortisone 1 % Cream APPLY TOPICALLY TO AFFECTED AREAS TWO TIMES A DAY FOR 10 DAYS 30 g 3   • lamoTRIgine (LAMICTAL) 100 MG Tab      • mirtazapine (REMERON) 30 MG Tab tablet      • NEXIUM 20 MG capsule      • hydrOXYzine HCl (ATARAX) 25 MG Tab Take 1-2 Tabs by mouth 3 times a day as needed for Itching or Anxiety. 90 Tab 2   • albuterol 108 (90 Base) MCG/ACT Aero Soln inhalation aerosol Inhale 2 Puffs by mouth every 6 hours as needed for Shortness of Breath. 6.7 g 2   • benzonatate (TESSALON) 100 MG Cap Take 1 Cap by mouth 3 times a day as needed for Cough. 60 Cap 0   • cetirizine (ZYRTEC) 10 MG Tab Take 1 Tab by mouth every day. 90 Tab 2   • cyclobenzaprine (FLEXERIL) 10 MG Tab   2   •  MG capsule   5   • busPIRone (BUSPAR) 10 MG Tab tablet   0   • Esomeprazole Magnesium (NEXIUM) 20 MG Pack Take 20 mg by mouth every day. 90 Each 3   • meloxicam (MOBIC) 15 MG tablet TAKE ONE TABLET BY MOUTH DAILY 30 Tab 2   • hydrocodone/acetaminophen (NORCO)  MG Tab Take 1-2 Tabs by mouth every 6 hours as needed.       No current facility-administered medications for this visit.        She  has a past medical history of Anxiety, Deep vein blood clot of right lower extremity (HCC), Depression, Hypertension, and Insomnia.    She  has a past surgical history that includes tonsillectomy and abdominal exploration.    Tetanus toxoids      ROS  Positive for urinary incontinence, positive for chronic vaginal pain.  Positive for hearing loss.  No fever, no vision changes, no headache, no dizziness, no chest pain, no edema, no shortness of  "breath, no abdominal pain, no rashes, no sores     All other systems reviewed and are negative.        Objective:     /90 (BP Location: Left arm, Patient Position: Sitting, BP Cuff Size: Large adult)   Pulse 93   Temp 36.6 °C (97.8 °F)   Resp 16   Ht 1.638 m (5' 4.5\")   Wt 107.5 kg (237 lb)   SpO2 94%  Body mass index is 40.05 kg/m².    Physical Exam:   Constitutional: Alert, no distress.  Eye: Equal, round and reactive, conjunctiva clear, lids normal.  ENMT: TMs normal without erythema.  Lips without lesions, poor dentition, oropharynx clear.  Neck: Trachea midline, no masses, no thyromegaly. No cervical or supraclavicular lymphadenopathy  Respiratory: Unlabored respiratory effort, lungs clear to auscultation, no wheezes, no ronchi.  Cardiovascular: Normal S1, S2, no murmur, no edema.   Abdomen: Soft, non-tender, no masses, no hepatosplenomegaly. No CVAT>   : did not examine.   Skin: Warm, dry, good turgor, no rashes in visible areas.  Neuro: normal sensation in extremities.   Psych: Alert and oriented x3, normal affect and mood.      Assessment and Plan:   The following treatment plan was discussed    1. Bipolar 2 disorder, major depressive episode (HCC)  Appears stable and improving actually in mood.  She has been seeing her therapist and this appears to be helping with her key medication skills and with her relationship.    2. Decreased hearing of both ears  - REFERRAL TO AUDIOLOGY    3. Vaginal pain  Discussed options with patient, it does appear that her incontinence and vaginal pain are likely related.  She has not had a vaginal exam in some time and does prefer to be referred to gynecology.  May be concern for bladder prolapse?  Vaginal atrophy?  Her urinalysis today is within normal limits.  We will send for culture although I suspect it will not yield infection.  I did offer perhaps a ultrasound, however at this time will defer to gynecology for consult.  - REFERRAL TO GYNECOLOGY    4. Mixed " stress and urge urinary incontinence  - REFERRAL TO GYNECOLOGY  - POCT Urinalysis  - URINE CULTURE(NEW); Future    5. Sensation of pressure in bladder area  - REFERRAL TO GYNECOLOGY  - POCT Urinalysis  - URINE CULTURE(NEW); Future    6. Essential hypertension  Appears to be mildly elevated, this appears to be a pattern.  Her psychiatric provider has been increasing some of her dosing and as such this may be influencing her blood pressure.  I have strongly encourage patient to complete fasting labs.  At this time will increase her hydrochlorothiazide to 25 mg, continue with lisinopril 20 mg.  She will return to clinic in 2 weeks or sooner as needed.  Discussed her signs or symptoms to seek emergent care.  - lisinopril-hydrochlorothiazide (PRINZIDE, ZESTORETIC) 20-25 MG per tablet; Take 1 Tab by mouth every day.  Dispense: 90 Tab; Refill: 0    7. Encounter for hepatitis C screening test for low risk patient  - HEP C VIRUS ANTIBODY; Future    8. Vaccine refused by patient  Declines all recommended vaccines.       Discussed diet, exercise, disease management and weight loss goals      Reviewed indication, dosage, usage and potential adverse effects of prescribed medications. Patient appears to understand, verbalizes understanding and is willing to try medications as prescribed.      Reviewed risks and benefits of treatment plan. Patient verbally agrees to plan of care.       Followup: Return in about 2 weeks (around 9/24/2019) for BP follow up .    [email or web address].     PLEASE NOTE: This dictation was created using voice recognition software. I have made every reasonable attempt to correct obvious errors, but I expect that there may be errors of grammar and possibly content that I did not discover prior finalizing this note.

## 2019-09-10 NOTE — ASSESSMENT & PLAN NOTE
"This is a chronic problem which patient reports she is been wearing a pad as needed.  Denies any dysuria.  She reports that sometimes she feels like \"something is there that is not supposed to be.  \"She continues to complain also of chronic vaginal pain.  She has not been evaluated by a gynecologist.  "

## 2019-09-10 NOTE — PATIENT INSTRUCTIONS
Your medical care was provided today by: GELY Treviño    Thank You for the opportunity to serve you.    You may receive a brief survey in the mail shortly regarding your visit today. Please take a few moments to complete the survey and return it; no postage is necessary. We are working to serve our patient population better, improve customer service and our patients overall experience and your input can help us to accomplish this. We thank you for your help and for the opportunity to serve you today and in the future.     Labs and Diagnostic Testing   Please note that if we have ordered labs or diagnostic testing, those results may be released to you on [employer] prior to my review. While we do our best to review your results as soon as possible, there are times when you may see your results prior to provider review. Of course, if you ever have any questions or concerns about your results, please contact our clinic.     Special Instructions:  Always call 9-1-1 immediately if you develop a life threatening emergency.    Unless told otherwise please take all medications as directed and complete prescription therapies.     Watch for the following signs that require additional evaluation: progressive lethargy or unresponsiveness, localized pain (chest, abdomen), shortness of breath, painful breathing, progressive vomiting with weakness, bloody stools, or new rash.     If you are prescribed pain medication or any other medication that is sedating, do not take medication before or while operating a vehicle or heavy machinery or equipment due to potential side effects such as drowsiness and/or dizziness.

## 2019-09-13 LAB
BACTERIA UR CULT: NORMAL
SIGNIFICANT IND 70042: NORMAL
SITE SITE: NORMAL
SOURCE SOURCE: NORMAL

## 2019-09-17 ENCOUNTER — HOSPITAL ENCOUNTER (OUTPATIENT)
Facility: MEDICAL CENTER | Age: 59
End: 2019-09-17
Attending: NURSE PRACTITIONER
Payer: MEDICAID

## 2019-09-17 PROCEDURE — 82274 ASSAY TEST FOR BLOOD FECAL: CPT

## 2019-09-29 DIAGNOSIS — Z12.11 SCREENING FOR COLON CANCER: ICD-10-CM

## 2019-09-30 LAB — HEMOCCULT STL QL IA: NEGATIVE

## 2019-10-03 ENCOUNTER — OFFICE VISIT (OUTPATIENT)
Dept: MEDICAL GROUP | Facility: CLINIC | Age: 59
End: 2019-10-03
Payer: MEDICAID

## 2019-10-03 VITALS
WEIGHT: 235 LBS | SYSTOLIC BLOOD PRESSURE: 144 MMHG | HEIGHT: 68 IN | BODY MASS INDEX: 35.61 KG/M2 | DIASTOLIC BLOOD PRESSURE: 86 MMHG | RESPIRATION RATE: 16 BRPM | TEMPERATURE: 98.9 F | HEART RATE: 82 BPM | OXYGEN SATURATION: 98 %

## 2019-10-03 DIAGNOSIS — Z23 NEED FOR VACCINATION: ICD-10-CM

## 2019-10-03 DIAGNOSIS — I10 ESSENTIAL HYPERTENSION: ICD-10-CM

## 2019-10-03 PROCEDURE — 99213 OFFICE O/P EST LOW 20 MIN: CPT | Mod: 25 | Performed by: NURSE PRACTITIONER

## 2019-10-03 PROCEDURE — 90471 IMMUNIZATION ADMIN: CPT | Performed by: NURSE PRACTITIONER

## 2019-10-03 PROCEDURE — 90686 IIV4 VACC NO PRSV 0.5 ML IM: CPT | Performed by: NURSE PRACTITIONER

## 2019-10-03 RX ORDER — AMLODIPINE BESYLATE 2.5 MG/1
2.5 TABLET ORAL DAILY
Qty: 30 TAB | Refills: 1 | Status: SHIPPED | OUTPATIENT
Start: 2019-10-03 | End: 2019-11-07 | Stop reason: SDUPTHER

## 2019-10-03 NOTE — PATIENT INSTRUCTIONS
Your medical care was provided today by: GELY Treviño    Thank You for the opportunity to serve you.    You may receive a brief survey in the mail shortly regarding your visit today. Please take a few moments to complete the survey and return it; no postage is necessary. We are working to serve our patient population better, improve customer service and our patients overall experience and your input can help us to accomplish this. We thank you for your help and for the opportunity to serve you today and in the future.     Labs and Diagnostic Testing   Please note that if we have ordered labs or diagnostic testing, those results may be released to you on [employer] prior to my review. While we do our best to review your results as soon as possible, there are times when you may see your results prior to provider review. Of course, if you ever have any questions or concerns about your results, please contact our clinic.     Special Instructions:  Always call 9-1-1 immediately if you develop a life threatening emergency.    Unless told otherwise please take all medications as directed and complete prescription therapies.     Watch for the following signs that require additional evaluation: progressive lethargy or unresponsiveness, localized pain (chest, abdomen), shortness of breath, painful breathing, progressive vomiting with weakness, bloody stools, or new rash.     If you are prescribed pain medication or any other medication that is sedating, do not take medication before or while operating a vehicle or heavy machinery or equipment due to potential side effects such as drowsiness and/or dizziness.

## 2019-10-03 NOTE — ASSESSMENT & PLAN NOTE
This is a chronic problem which has been well controlled with Lisiopril/HTCZ in the past. Patient denies chest pain, SOB, palpitations. Denies unilateral swelling or pain.     She does have a history of abnormal EKG, she was referred to cardiology and as such all subsequent diagnostic testing has been normal.  She has not completed fasting labs although she plans to do so.  Her blood pressure is minimally elevated again today.  She reports sometimes she is having some minimal more swelling in her lower extremities, but it does resolve.     Since her last appointment patient reports she is monitor her blood pressure at home and it is been varying although over the bottom number has been 100 a couple of times per her report.  Her blood pressure is still borderline today.  She has had a full work-up at cardiology which has showed no concern.  She does continue to take her blood pressure regime currently which is lisinopril hydrochlorthiazide.

## 2019-10-03 NOTE — PROGRESS NOTES
Subjective:     Denisse Ramirez is a 58 y.o. female here today for follow up of Hypertension     Essential hypertension  This is a chronic problem which has been well controlled with Lisiopril/HTCZ in the past. Patient denies chest pain, SOB, palpitations. Denies unilateral swelling or pain.     She does have a history of abnormal EKG, she was referred to cardiology and as such all subsequent diagnostic testing has been normal.  She has not completed fasting labs although she plans to do so.  Her blood pressure is minimally elevated again today.  She reports sometimes she is having some minimal more swelling in her lower extremities, but it does resolve.     Since her last appointment patient reports she is monitor her blood pressure at home and it is been varying although over the bottom number has been 100 a couple of times per her report.  Her blood pressure is still borderline today.  She has had a full work-up at cardiology which has showed no concern.  She does continue to take her blood pressure regime currently which is lisinopril hydrochlorthiazide.         Current medicines (including changes today)  Current Outpatient Medications   Medication Sig Dispense Refill   • amLODIPine (NORVASC) 2.5 MG Tab Take 1 Tab by mouth every day. 30 Tab 1   • lamoTRIgine (LAMICTAL) 100 MG Tab      • mirtazapine (REMERON) 30 MG Tab tablet      • NEXIUM 20 MG capsule      • lisinopril-hydrochlorothiazide (PRINZIDE, ZESTORETIC) 20-25 MG per tablet Take 1 Tab by mouth every day. 90 Tab 0   • hydrocortisone 1 % Cream APPLY TOPICALLY TO AFFECTED AREAS TWO TIMES A DAY FOR 10 DAYS 30 g 3   • hydrOXYzine HCl (ATARAX) 25 MG Tab Take 1-2 Tabs by mouth 3 times a day as needed for Itching or Anxiety. 90 Tab 2   • albuterol 108 (90 Base) MCG/ACT Aero Soln inhalation aerosol Inhale 2 Puffs by mouth every 6 hours as needed for Shortness of Breath. 6.7 g 2   • benzonatate (TESSALON) 100 MG Cap Take 1 Cap by mouth 3 times a day as needed  "for Cough. 60 Cap 0   • cetirizine (ZYRTEC) 10 MG Tab Take 1 Tab by mouth every day. 90 Tab 2   • cyclobenzaprine (FLEXERIL) 10 MG Tab   2   •  MG capsule   5   • busPIRone (BUSPAR) 10 MG Tab tablet   0   • Esomeprazole Magnesium (NEXIUM) 20 MG Pack Take 20 mg by mouth every day. 90 Each 3   • meloxicam (MOBIC) 15 MG tablet TAKE ONE TABLET BY MOUTH DAILY 30 Tab 2   • hydrocodone/acetaminophen (NORCO)  MG Tab Take 1-2 Tabs by mouth every 6 hours as needed.       No current facility-administered medications for this visit.        She  has a past medical history of Anxiety, Deep vein blood clot of right lower extremity (HCC), Depression, Hypertension, and Insomnia.    She  has a past surgical history that includes tonsillectomy and abdominal exploration.    Tetanus toxoids      ROS  No fever, no vision changes, no headache, no dizziness, no chest pain, no edema, no shortness of breath, no abdominal pain, no rashes, no sores     All other systems reviewed and are negative.        Objective:     /86 (BP Location: Left arm, Patient Position: Sitting, BP Cuff Size: Large adult)   Pulse 82   Temp 37.2 °C (98.9 °F) (Temporal)   Resp 16   Ht 1.727 m (5' 8\")   Wt 106.6 kg (235 lb)   SpO2 98%  Body mass index is 35.73 kg/m².    Physical Exam:   Constitutional: Alert, no distress.  Eye: Equal, round and reactive, conjunctiva clear, lids normal.  ENMT: Lips without lesions, good dentition, oropharynx clear.  Neck: Trachea midline, no masses, no thyromegaly. No cervical or supraclavicular lymphadenopathy  Respiratory: Unlabored respiratory effort, lungs clear to auscultation, no wheezes, no ronchi.  Cardiovascular: Normal S1, S2, no murmur, no edema.   Abdomen: Soft, non-tender, no masses, no hepatosplenomegaly.  Skin: Warm, dry, good turgor, no rashes in visible areas.  Neuro: normal sensation in extremities.   Psych: Alert and oriented x3, normal affect and mood.      Assessment and Plan:   The following " treatment plan was discussed    1. Essential hypertension  At this time I discussed with patient her current blood pressure, risks and benefits of treatment changes.  At this time we will trial low-dose of amlodipine 2.5 mg daily.  Patient to monitor her blood pressure at home.  Discussed signs or symptoms to seek more emergent care although she will follow-up in 2 weeks.  She is amenable to this plan.    2. Need for vaccination  - Influenza Vaccine Quad Injection (PF)      Reviewed indication, dosage, usage and potential adverse effects of prescribed medications. Patient appears to understand, verbalizes understanding and is willing to try medications as prescribed.      Reviewed risks and benefits of treatment plan. Patient verbally agrees to plan of care.       Followup: Return in about 2 weeks (around 10/17/2019).    DAVI [email or web address].     PLEASE NOTE: This dictation was created using voice recognition software. I have made every reasonable attempt to correct obvious errors, but I expect that there may be errors of grammar and possibly content that I did not discover prior finalizing this note.

## 2019-10-17 ENCOUNTER — TELEPHONE (OUTPATIENT)
Dept: MEDICAL GROUP | Facility: CLINIC | Age: 59
End: 2019-10-17

## 2019-10-17 NOTE — TELEPHONE ENCOUNTER
1. Caller Name: Pt.                                         Call Back Number: [phone] (home)       Patient approves a detailed voicemail message: N\A    IMZ- Flu Question     Pt. Called and stated her daughter is anti vaccines, however she got the flu shot  A few weeks back. Daughter doesn't want her around kids for 6 weeks because 'she sheds flu vaccine for 6 weeks'     She wanted me to ask you if you know what that means and if there is any harm of her being around her grand kinds as she got flu shot and grand kids don't have vaccines.

## 2019-10-17 NOTE — TELEPHONE ENCOUNTER
Please advise patient the influenza vaccine is not a live virus. It is an inactivated form of several strains of influenza which then gives your immune system the chance to make antibodies (which help to fight virus such as the flu) before you are exposed. Your grandchild are not in any danger for you to be around because you had the flu vaccine. The best way to prevent the spread of influenza is the obtain the vaccine, practice good handwashing and to stay home if you are sick. Much of the information regarding vaccines online is not accurate; you may refer to the CDC web site for further information or the vaccine information sheet.     GELY Treviño

## 2019-11-07 ENCOUNTER — OFFICE VISIT (OUTPATIENT)
Dept: MEDICAL GROUP | Facility: CLINIC | Age: 59
End: 2019-11-07
Payer: MEDICAID

## 2019-11-07 VITALS
DIASTOLIC BLOOD PRESSURE: 84 MMHG | TEMPERATURE: 97.2 F | HEART RATE: 71 BPM | OXYGEN SATURATION: 93 % | RESPIRATION RATE: 16 BRPM | HEIGHT: 68 IN | BODY MASS INDEX: 35.77 KG/M2 | WEIGHT: 236 LBS | SYSTOLIC BLOOD PRESSURE: 132 MMHG

## 2019-11-07 DIAGNOSIS — I10 ESSENTIAL HYPERTENSION: ICD-10-CM

## 2019-11-07 PROCEDURE — 99213 OFFICE O/P EST LOW 20 MIN: CPT | Performed by: NURSE PRACTITIONER

## 2019-11-07 RX ORDER — AMLODIPINE BESYLATE 2.5 MG/1
2.5 TABLET ORAL DAILY
Qty: 90 TAB | Refills: 1 | Status: SHIPPED | OUTPATIENT
Start: 2019-11-07 | End: 2020-03-24 | Stop reason: SDUPTHER

## 2019-11-07 NOTE — PATIENT INSTRUCTIONS
Your medical care was provided today by: GELY Treviño    Thank You for the opportunity to serve you.    You may receive a brief survey in the mail shortly regarding your visit today. Please take a few moments to complete the survey and return it; no postage is necessary. We are working to serve our patient population better, improve customer service and our patients overall experience and your input can help us to accomplish this. We thank you for your help and for the opportunity to serve you today and in the future.     Labs and Diagnostic Testing   Please note that if we have ordered labs or diagnostic testing, those results may be released to you on [employer] prior to my review. While we do our best to review your results as soon as possible, there are times when you may see your results prior to provider review. Of course, if you ever have any questions or concerns about your results, please contact our clinic.     Special Instructions:  Always call 9-1-1 immediately if you develop a life threatening emergency.    Unless told otherwise please take all medications as directed and complete prescription therapies.     Watch for the following signs that require additional evaluation: progressive lethargy or unresponsiveness, localized pain (chest, abdomen), shortness of breath, painful breathing, progressive vomiting with weakness, bloody stools, or new rash.     If you are prescribed pain medication or any other medication that is sedating, do not take medication before or while operating a vehicle or heavy machinery or equipment due to potential side effects such as drowsiness and/or dizziness.

## 2019-11-07 NOTE — ASSESSMENT & PLAN NOTE
This is a chronic problem which has been well controlled with Lisiopril/HTCZ in the past. Patient denies chest pain, SOB, palpitations. Denies unilateral swelling or pain.     She does have a history of abnormal EKG, she was referred to cardiology and as such all subsequent diagnostic testing has been normal.  She has not completed fasting labs although she plans to do so.  Her blood pressure is minimally elevated again today.  She reports sometimes she is having some minimal more swelling in her lower extremities, but it does resolve.     Since her last appointment patient has started amlodipine 2.5 mg daily.  Blood pressure is much improved today.  Patient denies any side effects from medication use.

## 2019-11-07 NOTE — PROGRESS NOTES
Subjective:     Denisse Ramirez is a 59 y.o. female here today for follow up of Hypertension     Essential hypertension  This is a chronic problem which has been well controlled with Lisiopril/HTCZ in the past. Patient denies chest pain, SOB, palpitations. Denies unilateral swelling or pain.     She does have a history of abnormal EKG, she was referred to cardiology and as such all subsequent diagnostic testing has been normal.  She has not completed fasting labs although she plans to do so.  Her blood pressure is minimally elevated again today.  She reports sometimes she is having some minimal more swelling in her lower extremities, but it does resolve.     Since her last appointment patient has started amlodipine 2.5 mg daily.  Blood pressure is much improved today.  Patient denies any side effects from medication use.       Current medicines (including changes today)  Current Outpatient Medications   Medication Sig Dispense Refill   • amLODIPine (NORVASC) 2.5 MG Tab Take 1 Tab by mouth every day. 90 Tab 1   • hydrOXYzine HCl (ATARAX) 25 MG Tab TAKE ONE TO TWO TABLETS BY MOUTH THREE TIMES A DAY AS NEEDED FOR ITCHING OR ANXIETY 90 Tab 2   • lamoTRIgine (LAMICTAL) 100 MG Tab      • NEXIUM 20 MG capsule      • lisinopril-hydrochlorothiazide (PRINZIDE, ZESTORETIC) 20-25 MG per tablet Take 1 Tab by mouth every day. 90 Tab 0   • hydrocortisone 1 % Cream APPLY TOPICALLY TO AFFECTED AREAS TWO TIMES A DAY FOR 10 DAYS 30 g 3   • albuterol 108 (90 Base) MCG/ACT Aero Soln inhalation aerosol Inhale 2 Puffs by mouth every 6 hours as needed for Shortness of Breath. 6.7 g 2   • benzonatate (TESSALON) 100 MG Cap Take 1 Cap by mouth 3 times a day as needed for Cough. 60 Cap 0   • cetirizine (ZYRTEC) 10 MG Tab Take 1 Tab by mouth every day. 90 Tab 2   • cyclobenzaprine (FLEXERIL) 10 MG Tab   2   • busPIRone (BUSPAR) 10 MG Tab tablet   0   • Esomeprazole Magnesium (NEXIUM) 20 MG Pack Take 20 mg by mouth every day. 90 Each 3   •  "meloxicam (MOBIC) 15 MG tablet TAKE ONE TABLET BY MOUTH DAILY 30 Tab 2   • hydrocodone/acetaminophen (NORCO)  MG Tab Take 1-2 Tabs by mouth every 6 hours as needed.     • mirtazapine (REMERON) 30 MG Tab tablet      •  MG capsule   5     No current facility-administered medications for this visit.        She  has a past medical history of Anxiety, Deep vein blood clot of right lower extremity (HCC), Depression, Hypertension, and Insomnia.    She  has a past surgical history that includes tonsillectomy and abdominal exploration.    Tetanus toxoids      ROS  No fever, no vision changes, no headache, no dizziness, no chest pain, no edema, no shortness of breath, no abdominal pain, no rashes, no sores     All other systems reviewed and are negative.        Objective:     /84 (BP Location: Left arm, Patient Position: Sitting, BP Cuff Size: Large adult)   Pulse 71   Temp 36.2 °C (97.2 °F) (Temporal)   Resp 16   Ht 1.727 m (5' 8\")   Wt 107 kg (236 lb)   SpO2 93%  Body mass index is 35.88 kg/m².    Physical Exam:   Constitutional: Alert, no distress.  Neck: Trachea midline, no masses, no thyromegaly. No cervical or supraclavicular lymphadenopathy  Respiratory: Unlabored respiratory effort, lungs clear to auscultation, no wheezes, no ronchi.  Cardiovascular: Normal S1, S2, no murmur, no edema.   Skin: Warm, dry, good turgor, no rashes in visible areas.  Neuro: normal sensation in extremities.   Psych: Alert and oriented x3, normal affect and mood.      Assessment and Plan:   The following treatment plan was discussed    1. Essential hypertension  Patient's blood pressure appears much improved with addition of amlodipine.  Patient will continue medication, continue to monitor blood pressure 1-2 times weekly at home.  She will follow-up in 2 weeks or sooner as needed.  Patient amenable to this plan.  She will continue the rest of her medication regime as well.      Discussed diet, exercise, disease " management and weight loss goals    Reviewed indication, dosage, usage and potential adverse effects of prescribed medications. Patient appears to understand, verbalizes understanding and is willing to try medications as prescribed.      Reviewed risks and benefits of treatment plan. Patient verbally agrees to plan of care.       Followup: Return in about 2 months (around 1/7/2020).    TORI Acevedo.     PLEASE NOTE: This dictation was created using voice recognition software. I have made every reasonable attempt to correct obvious errors, but I expect that there may be errors of grammar and possibly content that I did not discover prior finalizing this note.

## 2020-01-07 ENCOUNTER — OFFICE VISIT (OUTPATIENT)
Dept: MEDICAL GROUP | Facility: CLINIC | Age: 60
End: 2020-01-07
Payer: MEDICAID

## 2020-01-07 VITALS
OXYGEN SATURATION: 94 % | HEART RATE: 104 BPM | HEIGHT: 65 IN | WEIGHT: 240.4 LBS | SYSTOLIC BLOOD PRESSURE: 128 MMHG | BODY MASS INDEX: 40.05 KG/M2 | RESPIRATION RATE: 14 BRPM | DIASTOLIC BLOOD PRESSURE: 72 MMHG | TEMPERATURE: 97.3 F

## 2020-01-07 DIAGNOSIS — F31.81 BIPOLAR 2 DISORDER, MAJOR DEPRESSIVE EPISODE (HCC): ICD-10-CM

## 2020-01-07 DIAGNOSIS — M25.552 PAIN OF BOTH HIP JOINTS: ICD-10-CM

## 2020-01-07 DIAGNOSIS — M25.551 PAIN OF BOTH HIP JOINTS: ICD-10-CM

## 2020-01-07 DIAGNOSIS — I10 ESSENTIAL HYPERTENSION: ICD-10-CM

## 2020-01-07 PROCEDURE — 99214 OFFICE O/P EST MOD 30 MIN: CPT | Performed by: NURSE PRACTITIONER

## 2020-01-07 NOTE — PATIENT INSTRUCTIONS
Your medical care was provided today by: GELY Treviño    Thank You for the opportunity to serve you.    You may receive a brief survey in the mail shortly regarding your visit today. Please take a few moments to complete the survey and return it; no postage is necessary. We are working to serve our patient population better, improve customer service and our patients overall experience and your input can help us to accomplish this. We thank you for your help and for the opportunity to serve you today and in the future.     Labs and Diagnostic Testing   Please note that if we have ordered labs or diagnostic testing, those results may be released to you on [employer] prior to my review. While we do our best to review your results as soon as possible, there are times when you may see your results prior to provider review. Of course, if you ever have any questions or concerns about your results, please contact our clinic.     Special Instructions:  Always call 9-1-1 immediately if you develop a life threatening emergency.    Unless told otherwise please take all medications as directed and complete prescription therapies.     Watch for the following signs that require additional evaluation: progressive lethargy or unresponsiveness, localized pain (chest, abdomen), shortness of breath, painful breathing, progressive vomiting with weakness, bloody stools, or new rash.     If you are prescribed pain medication or any other medication that is sedating, do not take medication before or while operating a vehicle or heavy machinery or equipment due to potential side effects such as drowsiness and/or dizziness.

## 2020-01-08 NOTE — PROGRESS NOTES
Subjective:     Denisse Ramirez is a 59 y.o. female here today for follow up of Hypertension and evaluation management the following:    Pain of both hip joints  This is a chronic problem for which patient has suffered for several years.  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  She does often ambulate with a cane.  She has been seen by orthopedics.  She currently has permanent disability plates although she reports she needs a disabled placard for when she is not within her vehicle.  She has brought the paperwork with her today.    Essential hypertension  This is a chronic problem which is now much better controlled with her current medication regime to include the addition of amlodipine to her previously prescribed lisinopril-hydrochlorothiazide.  Blood pressure is within normal limits today.  She has a history of abnormal EKG although she has been referred to cardiology and all subsequent testing is been normal.  She unfortunately has not completed any fasting labs in some time although they have been ordered.  She admits that she will try to do so in the future.  Denies any chest pain, shortness of breath, palpitations.  Appears to be stable.    Bipolar 2 disorder, major depressive episode (HCC)  This is a chronic problem.  Patient denies SI/HI.  She has been seeing [facility].  Recently had her medications changed, diagnosis of bipolar 2 to major depressive disorder.  She does report some of her medications were recently changed by her mental health provider, she does not have her medications with her today.  I have encouraged patient to do so in the future so we can be sure to reflect our accurate medication list.        Current medicines (including changes today)  Current Outpatient Medications   Medication Sig Dispense Refill   • lisinopril-hydrochlorothiazide (PRINZIDE) 20-25 MG per tablet TAKE ONE TABLET BY MOUTH DAILY 90 Tab 1   •  "amLODIPine (NORVASC) 2.5 MG Tab Take 1 Tab by mouth every day. 90 Tab 1   • hydrOXYzine HCl (ATARAX) 25 MG Tab TAKE ONE TO TWO TABLETS BY MOUTH THREE TIMES A DAY AS NEEDED FOR ITCHING OR ANXIETY 90 Tab 2   • lamoTRIgine (LAMICTAL) 100 MG Tab      • hydrocortisone 1 % Cream APPLY TOPICALLY TO AFFECTED AREAS TWO TIMES A DAY FOR 10 DAYS 30 g 3   • albuterol 108 (90 Base) MCG/ACT Aero Soln inhalation aerosol Inhale 2 Puffs by mouth every 6 hours as needed for Shortness of Breath. 6.7 g 2   • cetirizine (ZYRTEC) 10 MG Tab Take 1 Tab by mouth every day. 90 Tab 2   • cyclobenzaprine (FLEXERIL) 10 MG Tab   2   •  MG capsule   5   • busPIRone (BUSPAR) 10 MG Tab tablet   0   • Esomeprazole Magnesium (NEXIUM) 20 MG Pack Take 20 mg by mouth every day. 90 Each 3   • meloxicam (MOBIC) 15 MG tablet TAKE ONE TABLET BY MOUTH DAILY 30 Tab 2   • hydrocodone/acetaminophen (NORCO)  MG Tab Take 1-2 Tabs by mouth every 6 hours as needed.       No current facility-administered medications for this visit.        She  has a past medical history of Anxiety, Deep vein blood clot of right lower extremity (HCC), Depression, Hypertension, and Insomnia.    She  has a past surgical history that includes tonsillectomy and abdominal exploration.    Tetanus toxoids      ROS  Positive for chronic hip pain.  No fever, no vision changes, no headache, no dizziness, no chest pain, no edema, no shortness of breath, no abdominal pain, no rashes, no sores     All other systems reviewed and are negative.        Objective:     /72   Pulse (!) 104   Temp 36.3 °C (97.3 °F) (Temporal)   Resp 14   Ht 1.638 m (5' 4.5\")   Wt 109 kg (240 lb 6.4 oz)   SpO2 94%  Body mass index is 40.63 kg/m².    Physical Exam:   Constitutional: Alert, no distress.  Slow and cautious gait.  Eye: Equal, round and reactive, conjunctiva clear, lids normal.  ENMT: Lips without lesions, good dentition, oropharynx clear.  Neck: Trachea midline, no masses, no " thyromegaly. No cervical or supraclavicular lymphadenopathy  Respiratory: Unlabored respiratory effort, lungs clear to auscultation, no wheezes, no ronchi.  Cardiovascular: Normal S1, S2, no murmur, no edema.   Skin: Warm, dry, good turgor, no rashes in visible areas.  Neuro: normal sensation in extremities.   Psych: Alert and oriented x3, normal affect and mood.      Assessment and Plan:   The following treatment plan was discussed    1. Essential hypertension  Stable continue with current medication regime.    2. Pain of both hip joints  Completed paperwork for patient as requested.    3. Bipolar 2 disorder, major depressive episode (HCC)  Advised patient to bring all her medication bottles with her to her next appointment.  She does verbalize understanding.  Strongly encourage patient to complete fasting labs as they have been ordered for some time and she continues to forget and have noncompliance in regards to obtaining them.  She does admit she will try to do so in the next 2 to 3 months.      Reviewed indication, dosage, usage and potential adverse effects of prescribed medications. Patient appears to understand, verbalizes understanding and is willing to try medications as prescribed.      Reviewed risks and benefits of treatment plan. Patient verbally agrees to plan of care.       Followup: Return in about 3 months (around 4/7/2020).    ALICIA [email or web address].[email or web address].     PLEASE NOTE: This dictation was created using voice recognition software. I have made every reasonable attempt to correct obvious errors, but I expect that there may be errors of grammar and possibly content that I did not discover prior finalizing this note.

## 2020-01-08 NOTE — ASSESSMENT & PLAN NOTE
This is a chronic problem for which patient has suffered for several years.  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  She does often ambulate with a cane.  She has been seen by orthopedics.  She currently has permanent disability plates although she reports she needs a disabled placard for when she is not within her vehicle.  She has brought the paperwork with her today.

## 2020-01-08 NOTE — ASSESSMENT & PLAN NOTE
This is a chronic problem.  Patient denies SI/HI.  She has been seeing [place] [facility].  Recently had her medications changed, diagnosis of bipolar 2 to major depressive disorder.  She does report some of her medications were recently changed by her mental health provider, she does not have her medications with her today.  I have encouraged patient to do so in the future so we can be sure to reflect our accurate medication list.

## 2020-01-08 NOTE — ASSESSMENT & PLAN NOTE
This is a chronic problem which is now much better controlled with her current medication regime to include the addition of amlodipine to her previously prescribed lisinopril-hydrochlorothiazide.  Blood pressure is within normal limits today.  She has a history of abnormal EKG although she has been referred to cardiology and all subsequent testing is been normal.  She unfortunately has not completed any fasting labs in some time although they have been ordered.  She admits that she will try to do so in the future.  Denies any chest pain, shortness of breath, palpitations.  Appears to be stable.

## 2020-03-05 ENCOUNTER — TELEPHONE (OUTPATIENT)
Dept: MEDICAL GROUP | Facility: CLINIC | Age: 60
End: 2020-03-05

## 2020-03-05 ENCOUNTER — GYNECOLOGY VISIT (OUTPATIENT)
Dept: OBGYN | Facility: CLINIC | Age: 60
End: 2020-03-05
Payer: MEDICAID

## 2020-03-05 VITALS — DIASTOLIC BLOOD PRESSURE: 88 MMHG | WEIGHT: 245 LBS | BODY MASS INDEX: 41.4 KG/M2 | SYSTOLIC BLOOD PRESSURE: 140 MMHG

## 2020-03-05 DIAGNOSIS — R32 URINARY INCONTINENCE, UNSPECIFIED TYPE: ICD-10-CM

## 2020-03-05 PROCEDURE — 99203 OFFICE O/P NEW LOW 30 MIN: CPT | Performed by: OBSTETRICS & GYNECOLOGY

## 2020-03-05 RX ORDER — OXYBUTYNIN CHLORIDE 5 MG/1
5 TABLET, EXTENDED RELEASE ORAL DAILY
Qty: 30 TAB | Refills: 3 | Status: SHIPPED | OUTPATIENT
Start: 2020-03-05 | End: 2020-06-21

## 2020-03-05 SDOH — HEALTH STABILITY: MENTAL HEALTH: HOW OFTEN DO YOU HAVE A DRINK CONTAINING ALCOHOL?: MONTHLY OR LESS

## 2020-03-05 NOTE — TELEPHONE ENCOUNTER
Pt came in and stated that Medicaid will not pay for Nexium would like to know if you could please send in a script for something different

## 2020-03-06 DIAGNOSIS — K21.9 GASTROESOPHAGEAL REFLUX DISEASE WITHOUT ESOPHAGITIS: ICD-10-CM

## 2020-03-06 RX ORDER — FAMOTIDINE 20 MG/1
20 TABLET, FILM COATED ORAL 2 TIMES DAILY
Qty: 60 TAB | Refills: 2 | Status: SHIPPED | OUTPATIENT
Start: 2020-03-06 | End: 2020-06-01

## 2020-03-06 NOTE — PROGRESS NOTES
59 y.o.     Female presents as new patient for evaluation of urinary  Urgency   Patient has had bladder issues , but > 8 months now with frquency / urge and small amounts   Subjective:Pain:  Pain:  Denies dysuria , or pelvic pain   diarrhea :  No          Vaginal discharge: no discharge   Vaginal Bleeding: none  Dysmenorrhea:negative, Dyspareunia:negative  Problems with contraception: negative     LMP:  No LMP recorded.  Patient reports no menses x 10 years , no bleeding ROS:  Neurological:Denies, headaches, paresthesias back pain , considering surgery   Breast:{Denies breast tenderness, mass, discharge, changes in size or contour, or abnormal cyclic discomfort.  GastroIntestinalNegative for abdominal discomfort, blood in stools or black stools  Genito-urinary:{Positive for frequency   incontinence  sometimes with cough   decreased stream small amounts after urgency to get their   urgency yes   Menstrual: DENIES, bleeding after menopause  All other systems reviewed : and are Negative  PMH:  Past Medical History:   Diagnosis Date   • Anxiety    • Arthritis    • Deep vein blood clot of right lower extremity (HCC)      due to birth control patch.   • Depression    • Hypertension    • Insomnia    • Muscle disorder      Past Surgical History:   Procedure Laterality Date   • ABDOMINAL EXPLORATION     • TONSILLECTOMY       None  Family History   Problem Relation Age of Onset   • Lung Disease Mother    • Cancer Father    • Heart Disease Father    • Lung Disease Brother    • Lung Disease Brother      Social History     Socioeconomic History   • Marital status:      Spouse name: Not on file   • Number of children: Not on file   • Years of education: Not on file   • Highest education level: Not on file   Occupational History   • Not on file   Social Needs   • Financial resource strain: Not on file   • Food insecurity     Worry: Not on file     Inability: Not on file   • Transportation needs     Medical: Not  on file     Non-medical: Not on file   Tobacco Use   • Smoking status: Former Smoker     Packs/day: 1.00     Years: 35.00     Pack years: 35.00     Types: Cigarettes     Last attempt to quit: 2018     Years since quittin.8   • Smokeless tobacco: Never Used   Substance and Sexual Activity   • Alcohol use: Yes     Frequency: Monthly or less     Comment: 1 glass wine/mo   • Drug use: No     Comment: 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   • Sexual activity: Not Currently     Partners: Male   Lifestyle   • Physical activity     Days per week: Not on file     Minutes per session: Not on file   • Stress: Not on file   Relationships   • Social connections     Talks on phone: Not on file     Gets together: Not on file     Attends Yazdanism service: Not on file     Active member of club or organization: Not on file     Attends meetings of clubs or organizations: Not on file     Relationship status: Not on file   • Intimate partner violence     Fear of current or ex partner: Not on file     Emotionally abused: Not on file     Physically abused: Not on file     Forced sexual activity: Not on file   Other Topics Concern   • Not on file   Social History Narrative   • Not on file       Current Outpatient Medications on File Prior to Visit   Medication Sig Dispense Refill   • cetirizine (ZYRTEC) 10 MG Tab TAKE ONE TABLET BY MOUTH ONE TIME A DAY 90 Tab 2   • lisinopril-hydrochlorothiazide (PRINZIDE) 20-25 MG per tablet TAKE ONE TABLET BY MOUTH DAILY 90 Tab 1   • amLODIPine (NORVASC) 2.5 MG Tab Take 1 Tab by mouth every day. 90 Tab 1   • hydrOXYzine HCl (ATARAX) 25 MG Tab TAKE ONE TO TWO TABLETS BY MOUTH THREE TIMES A DAY AS NEEDED FOR ITCHING OR ANXIETY 90 Tab 2   • lamoTRIgine (LAMICTAL) 100 MG Tab      • hydrocortisone 1 % Cream APPLY TOPICALLY TO AFFECTED AREAS TWO TIMES A DAY FOR 10 DAYS 30 g 3   • albuterol 108 (90 Base) MCG/ACT Aero Soln inhalation aerosol Inhale 2 Puffs by mouth every 6 hours as needed for Shortness  of Breath. 6.7 g 2   • cyclobenzaprine (FLEXERIL) 10 MG Tab   2   •  MG capsule   5   • Esomeprazole Magnesium (NEXIUM) 20 MG Pack Take 20 mg by mouth every day. 90 Each 3   • meloxicam (MOBIC) 15 MG tablet TAKE ONE TABLET BY MOUTH DAILY 30 Tab 2   • hydrocodone/acetaminophen (NORCO)  MG Tab Take 1-2 Tabs by mouth every 6 hours as needed.     • busPIRone (BUSPAR) 10 MG Tab tablet   0     No current facility-administered medications on file prior to visit.        Summary of Allergies:  Tetanus toxoids  /88 (BP Location: Right arm, Patient Position: Sitting)   Wt 111.1 kg (245 lb)   BMI 41.40 kg/m²   Exam:  Skin: Warm and dry with no lesions or rashes.  Lymph: no inguinal nodes  Neuro: Awake, alert and oriented x 3  ENT:Normal  Eyes: corneas clear  BREAST: negative  Cor:  RRR No M  LUNGS:Normal breath sounds  Abdominal :   positive findings: obese  Genito-Urinary:Perineum and external genitalia normal atrophic vaginal vault , cerrix , is fusing to back wall and cuff at apex , bladder suspended , + cough after 3 times ,   Extremities:no cyanosis, clubbing or edema present    Psych: normal affect  LAB:  Lab:No results found for this or any previous visit (from the past 336 hour(s)).       Ass:  Urgency   Hx and exam c/w multifactorial etiology   Vaginal atrophy and  OAB, with probable low volume threshold     P.  1. Not candidate for Estrogen Rx  ( patient reports question of Right DVT 2004, but not Treated )  2. Ditropan  ER 5 mg   3. Increase water / urine acidification

## 2020-03-06 NOTE — NON-PROVIDER
Pt here today for bladder problems.  C/o feels like she has to urinate all the time, and feels something poking out of the urethra.  Phone # [phone]  Pharmacy verified.

## 2020-03-24 ENCOUNTER — TELEPHONE (OUTPATIENT)
Dept: MEDICAL GROUP | Facility: CLINIC | Age: 60
End: 2020-03-24

## 2020-03-24 DIAGNOSIS — G89.29 CHRONIC BILATERAL LOW BACK PAIN WITH RIGHT-SIDED SCIATICA: ICD-10-CM

## 2020-03-24 DIAGNOSIS — I10 ESSENTIAL HYPERTENSION: ICD-10-CM

## 2020-03-24 DIAGNOSIS — M54.41 CHRONIC BILATERAL LOW BACK PAIN WITH RIGHT-SIDED SCIATICA: ICD-10-CM

## 2020-03-24 RX ORDER — ECHINACEA PURPUREA EXTRACT 125 MG
1 TABLET ORAL PRN
Qty: 1 BOTTLE | Refills: 0 | Status: SHIPPED | OUTPATIENT
Start: 2020-03-24 | End: 2020-09-02

## 2020-03-24 RX ORDER — AMLODIPINE BESYLATE 2.5 MG/1
2.5 TABLET ORAL DAILY
Qty: 90 TAB | Refills: 0 | Status: SHIPPED | OUTPATIENT
Start: 2020-03-24 | End: 2020-08-26 | Stop reason: SDUPTHER

## 2020-03-24 RX ORDER — BENZOCAINE/MENTHOL 6 MG-10 MG
LOZENGE MUCOUS MEMBRANE
Qty: 30 G | Refills: 0 | Status: SHIPPED | OUTPATIENT
Start: 2020-03-24 | End: 2020-05-04

## 2020-03-24 RX ORDER — LISINOPRIL AND HYDROCHLOROTHIAZIDE 25; 20 MG/1; MG/1
TABLET ORAL
Qty: 90 TAB | Refills: 0 | Status: SHIPPED | OUTPATIENT
Start: 2020-03-24 | End: 2020-09-02 | Stop reason: SDUPTHER

## 2020-03-24 NOTE — TELEPHONE ENCOUNTER
Pt called and would stated that she would like a second opinion as [facility] wants to do surgery. Would like to know if you could please put in a referral in for [place] Fracture.

## 2020-03-24 NOTE — TELEPHONE ENCOUNTER
Was the patient seen in the last year in this department? Yes    Does patient have an active prescription for medications requested? Yes    Received Request Via: Pharmacy    Hospital Outpatient Visit on 09/17/2019   Component Date Value   • Occult Blood, [place] 09/17/2019 Negative    Hospital Outpatient Visit on 09/10/2019   Component Date Value   • Significant Indicator 09/10/2019 NEG    • Source 09/10/2019 UR    • Site 09/10/2019 URINE, CLEAN CATCH    • Culture Result 09/10/2019 No growth at 48 hours.    Office Visit on 09/10/2019   Component Date Value   • POC Color 09/10/2019 yellow    • POC Appearance 09/10/2019 clear    • POC Leukocyte Esterase 09/10/2019 negative    • POC Nitrites 09/10/2019 negative    • POC Urobiligen 09/10/2019 0.2    • POC Protein 09/10/2019 negative    • POC Urine PH 09/10/2019 6.5    • POC Blood 09/10/2019 negative    • POC Specific Gravity 09/10/2019 1.010    • POC Ketones 09/10/2019 negative    • POC Bilirubin 09/10/2019 negative    • POC Glucose 09/10/2019 negative    Office Visit on 04/15/2019   Component Date Value   • Rapid Influenza A-B 04/15/2019 Neg A, Neg B    • Internal Control Positive 04/15/2019 Positive    • Internal Control Negative 04/15/2019 Negative    ]

## 2020-06-01 RX ORDER — BENZOCAINE/MENTHOL 6 MG-10 MG
LOZENGE MUCOUS MEMBRANE
Qty: 1 TUBE | Refills: 0 | Status: SHIPPED | OUTPATIENT
Start: 2020-06-01 | End: 2020-06-30

## 2020-06-21 RX ORDER — OXYBUTYNIN CHLORIDE 5 MG/1
TABLET, EXTENDED RELEASE ORAL
Qty: 30 TAB | Refills: 2 | Status: SHIPPED | OUTPATIENT
Start: 2020-06-21 | End: 2020-10-08

## 2020-06-29 DIAGNOSIS — R21 RASH: ICD-10-CM

## 2020-06-30 RX ORDER — BENZOCAINE/MENTHOL 6 MG-10 MG
LOZENGE MUCOUS MEMBRANE
Qty: 1 TUBE | Refills: 0 | Status: SHIPPED | OUTPATIENT
Start: 2020-06-30 | End: 2020-09-02 | Stop reason: SDUPTHER

## 2020-07-15 ENCOUNTER — TELEPHONE (OUTPATIENT)
Dept: MEDICAL GROUP | Facility: CLINIC | Age: 60
End: 2020-07-15

## 2020-07-15 NOTE — TELEPHONE ENCOUNTER
Message from pharmacy stating that Famotidine is unavailable please send in a different prescription    Problem: Patient Care Overview  Goal: Plan of Care Review  Outcome: Ongoing (interventions implemented as appropriate)   09/04/19 0448   Coping/Psychosocial   Plan of Care Reviewed With patient   Plan of Care Review   Progress improving   OTHER   Outcome Summary VSS. U/1, firm, light bleeding. Breastfeeding not going well, offered to help get baby latched. Patient declined. Pain controlled with meds.     Goal: Individualization and Mutuality  Outcome: Ongoing (interventions implemented as appropriate)    Goal: Discharge Needs Assessment  Outcome: Ongoing (interventions implemented as appropriate)    Goal: Interprofessional Rounds/Family Conf  Outcome: Ongoing (interventions implemented as appropriate)      Problem: Breastfeeding (Adult,Obstetrics,Pediatric)  Goal: Signs and Symptoms of Listed Potential Problems Will be Absent, Minimized or Managed (Breastfeeding)  Outcome: Ongoing (interventions implemented as appropriate)      Problem: Postpartum (Vaginal Delivery) (Adult,Obstetrics,Pediatric)  Goal: Signs and Symptoms of Listed Potential Problems Will be Absent, Minimized or Managed (Postpartum)  Outcome: Ongoing (interventions implemented as appropriate)

## 2020-07-16 RX ORDER — CIMETIDINE 400 MG/1
200-400 TABLET, FILM COATED ORAL 2 TIMES DAILY PRN
Qty: 60 TAB | Refills: 0 | Status: SHIPPED
Start: 2020-07-16 | End: 2020-09-02

## 2020-07-24 ENCOUNTER — TELEPHONE (OUTPATIENT)
Dept: MEDICAL GROUP | Facility: CLINIC | Age: 60
End: 2020-07-24

## 2020-08-26 RX ORDER — AMLODIPINE BESYLATE 2.5 MG/1
2.5 TABLET ORAL DAILY
Qty: 90 TAB | Refills: 0 | Status: SHIPPED | OUTPATIENT
Start: 2020-08-26 | End: 2020-09-02 | Stop reason: SDUPTHER

## 2020-08-26 NOTE — TELEPHONE ENCOUNTER
Was the patient seen in the last year in this department? Yes    Does patient have an active prescription for medications requested? Yes    Received Request Via: Pharmacy    Hospital Outpatient Visit on 09/17/2019   Component Date Value   • Occult Blood, IA 09/17/2019 Negative    Hospital Outpatient Visit on 09/10/2019   Component Date Value   • Significant Indicator 09/10/2019 NEG    • Source 09/10/2019 UR    • Site 09/10/2019 URINE, CLEAN CATCH    • Culture Result 09/10/2019 No growth at 48 hours.    Office Visit on 09/10/2019   Component Date Value   • POC Color 09/10/2019 yellow    • POC Appearance 09/10/2019 clear    • POC Leukocyte Esterase 09/10/2019 negative    • POC Nitrites 09/10/2019 negative    • POC Urobiligen 09/10/2019 0.2    • POC Protein 09/10/2019 negative    • POC Urine PH 09/10/2019 6.5    • POC Blood 09/10/2019 negative    • POC Specific Gravity 09/10/2019 1.010    • POC Ketones 09/10/2019 negative    • POC Bilirubin 09/10/2019 negative    • POC Glucose 09/10/2019 negative    ]

## 2020-09-02 ENCOUNTER — OFFICE VISIT (OUTPATIENT)
Dept: MEDICAL GROUP | Facility: CLINIC | Age: 60
End: 2020-09-02
Payer: MEDICAID

## 2020-09-02 VITALS
OXYGEN SATURATION: 95 % | SYSTOLIC BLOOD PRESSURE: 134 MMHG | DIASTOLIC BLOOD PRESSURE: 84 MMHG | BODY MASS INDEX: 39.61 KG/M2 | TEMPERATURE: 97.1 F | HEART RATE: 100 BPM | RESPIRATION RATE: 16 BRPM | HEIGHT: 64 IN | WEIGHT: 232 LBS

## 2020-09-02 DIAGNOSIS — J30.89 CHRONIC NON-SEASONAL ALLERGIC RHINITIS: ICD-10-CM

## 2020-09-02 DIAGNOSIS — J30.89 ENVIRONMENTAL AND SEASONAL ALLERGIES: ICD-10-CM

## 2020-09-02 DIAGNOSIS — F41.9 ANXIETY: ICD-10-CM

## 2020-09-02 DIAGNOSIS — F42.4 PICKING OWN SKIN: ICD-10-CM

## 2020-09-02 DIAGNOSIS — R73.03 PREDIABETES: ICD-10-CM

## 2020-09-02 DIAGNOSIS — E66.9 OBESITY (BMI 30-39.9): ICD-10-CM

## 2020-09-02 DIAGNOSIS — Z12.31 ENCOUNTER FOR SCREENING MAMMOGRAM FOR BREAST CANCER: ICD-10-CM

## 2020-09-02 DIAGNOSIS — I10 ESSENTIAL HYPERTENSION: ICD-10-CM

## 2020-09-02 DIAGNOSIS — Z00.00 HEALTHCARE MAINTENANCE: ICD-10-CM

## 2020-09-02 DIAGNOSIS — F31.81 BIPOLAR 2 DISORDER, MAJOR DEPRESSIVE EPISODE (HCC): ICD-10-CM

## 2020-09-02 DIAGNOSIS — Z12.11 SCREENING FOR COLORECTAL CANCER: ICD-10-CM

## 2020-09-02 DIAGNOSIS — Z12.12 SCREENING FOR COLORECTAL CANCER: ICD-10-CM

## 2020-09-02 DIAGNOSIS — E66.09 CLASS 2 OBESITY DUE TO EXCESS CALORIES WITHOUT SERIOUS COMORBIDITY WITH BODY MASS INDEX (BMI) OF 39.0 TO 39.9 IN ADULT: ICD-10-CM

## 2020-09-02 DIAGNOSIS — E78.5 MILD HYPERLIPIDEMIA: ICD-10-CM

## 2020-09-02 PROCEDURE — 99214 OFFICE O/P EST MOD 30 MIN: CPT | Performed by: PHYSICIAN ASSISTANT

## 2020-09-02 RX ORDER — LISINOPRIL AND HYDROCHLOROTHIAZIDE 25; 20 MG/1; MG/1
TABLET ORAL
Qty: 90 TAB | Refills: 2 | Status: SHIPPED | OUTPATIENT
Start: 2020-09-02 | End: 2021-05-28

## 2020-09-02 RX ORDER — DOXEPIN HYDROCHLORIDE 25 MG/1
2 CAPSULE ORAL
COMMUNITY
Start: 2020-08-26 | End: 2022-08-11

## 2020-09-02 RX ORDER — LAMOTRIGINE 150 MG/1
1 TABLET ORAL DAILY
COMMUNITY
Start: 2020-08-25 | End: 2021-10-28

## 2020-09-02 RX ORDER — ALBUTEROL SULFATE 90 UG/1
2 AEROSOL, METERED RESPIRATORY (INHALATION) EVERY 6 HOURS PRN
Qty: 6.7 G | Refills: 2 | Status: SHIPPED | OUTPATIENT
Start: 2020-09-02 | End: 2021-07-07 | Stop reason: SDUPTHER

## 2020-09-02 RX ORDER — CETIRIZINE HYDROCHLORIDE 10 MG/1
10 TABLET ORAL DAILY
Qty: 90 TAB | Refills: 2 | Status: SHIPPED | OUTPATIENT
Start: 2020-09-02 | End: 2021-07-07 | Stop reason: SDUPTHER

## 2020-09-02 RX ORDER — AMLODIPINE BESYLATE 2.5 MG/1
2.5 TABLET ORAL DAILY
Qty: 90 TAB | Refills: 2 | Status: SHIPPED | OUTPATIENT
Start: 2020-09-02 | End: 2021-07-07 | Stop reason: SDUPTHER

## 2020-09-02 RX ORDER — VENLAFAXINE HYDROCHLORIDE 37.5 MG/1
1 CAPSULE, EXTENDED RELEASE ORAL DAILY
COMMUNITY
Start: 2020-08-10 | End: 2020-11-25

## 2020-09-02 RX ORDER — HYDROXYZINE 50 MG/1
1 TABLET, FILM COATED ORAL 3 TIMES DAILY
COMMUNITY
Start: 2020-07-30 | End: 2023-01-11

## 2020-09-02 RX ORDER — BENZOCAINE/MENTHOL 6 MG-10 MG
LOZENGE MUCOUS MEMBRANE
Qty: 14 G | Refills: 1 | Status: SHIPPED | OUTPATIENT
Start: 2020-09-02 | End: 2021-07-07 | Stop reason: SDUPTHER

## 2020-09-02 NOTE — ASSESSMENT & PLAN NOTE
"Patient had A1c checked in 2018 was 6.1.  Has had no lab work since.  Patient states that her diet is \"not the best\" and that she really does not watch what she eats.  We have set up a nutrition consult, and we have talked about ways to add exercise to her daily routine that she can do even with chronic pain.  I have ordered labs, we will discuss results at next visit.  We will continue to follow.  "

## 2020-09-02 NOTE — ASSESSMENT & PLAN NOTE
This is a chronic condition that the patient has had since she was 15.  She currently takes cetirizine 10 mg daily, albuterol 2 puffs every 6 hours as needed.  She will continue her medications at the current dose.  We will continue to follow.

## 2020-09-02 NOTE — ASSESSMENT & PLAN NOTE
Currently followed by [facility] who manages her medications.  Buspirone 10 mg daily, hydroxyzine 25 mg 3 times daily.  They will continue to manage we will continue to follow.

## 2020-09-02 NOTE — PROGRESS NOTES
"Chief Complaint   Patient presents with   • Headache       HISTORY OF PRESENT ILLNESS: Patient is a 59 y.o. female established patient who presents today to discuss the following issues:    Essential hypertension  Chronic condition currently controlled on Prinzide 20-25 mg daily, amlodipine 2.5 mg daily.  Pressure in the office today 134/84.  Continue on current medications at current dose, I refilled prescriptions today.    Mild hyperlipidemia  Not checked since 2018 when triglycerides were high and all other results normal. No medications prescribed.  We discussed controlling this with diet and exercise as tolerated due to her other chronic conditions.  I have placed a consult for nutrition counseling for weight loss and cholesterol management.  Labs ordered today.  We will continue to follow.    Prediabetes  Patient had A1c checked in 2018 was 6.1.  Has had no lab work since.  Patient states that her diet is \"not the best\" and that she really does not watch what she eats.  We have set up a nutrition consult, and we have talked about ways to add exercise to her daily routine that she can do even with chronic pain.  I have ordered labs, we will discuss results at next visit.  We will continue to follow.    Anxiety  Currently followed by [facility] who manages her medications.  Buspirone 10 mg daily, hydroxyzine 25 mg 3 times daily.  They will continue to manage we will continue to follow.    Chronic non-seasonal allergic rhinitis  This is a chronic condition that the patient has had since she was 15.  She currently takes cetirizine 10 mg daily, albuterol 2 puffs every 6 hours as needed.  She will continue her medications at the current dose.  We will continue to follow.    Class 2 obesity due to excess calories with body mass index (BMI) of 39.0 to 39.9 in adult  This is a chronic condition for this patient due to excessive caloric intake.  Weight today 232 pounds down 12 pounds from her weight in " from January.  Nutrition consult placed.  Recommended that both her and her  attend the nutrition appointment as both do cooking in the house.  We will continue to follow.    Bipolar 2 disorder, major depressive episode (HCC)  Followed by [facility].  Currently taking Lamictal 100 mg daily, buspirone 10 mg daily, hydroxyzine 25 mg 3 times daily as needed.  They will continue to manage, we will continue to follow.    Picking own skin  Patient has chronic itchy skin due to environmental and seasonal allergies.  She scratches until she gets sores, then she picks at them.  She currently takes Atarax 50 mg 3 times daily and hydrocortisone 1% to affected areas twice daily.  Continue medications at the current dose.  We will continue to monitor.    Healthcare maintenance  Patient is due for routine lab work, colonoscopy, mammogram, second shingles shot, and Pap smear.  Referrals been placed today for mammogram and colonoscopy.  Shingles shot has been ordered. She sees Dr. Fahad Zamorano with women's health.  She will make an appointment with them for her Pap smear.  We will continue to follow her health maintenance issues.      Patient Active Problem List    Diagnosis Date Noted   • Healthcare maintenance 09/02/2020   • Environmental and seasonal allergies 09/02/2020   • Mixed stress and urge urinary incontinence 09/10/2019   • Sensation of pressure in bladder area 09/10/2019   • Class 2 obesity due to excess calories with body mass index (BMI) of 39.0 to 39.9 in adult 08/13/2019   • Picking own skin 08/13/2019   • Essential hypertension 04/10/2018   • Hot flashes due to menopause 04/10/2018   • Bilateral lower extremity edema 04/10/2018   • History of methamphetamine use 04/10/2018   • Uncomplicated opioid dependence (HCC) 04/10/2018   • Abnormal EKG 04/10/2018   • Pain management contract agreement 01/05/2018   • Chronic non-seasonal allergic rhinitis 01/05/2018   • Decreased hearing of both ears  01/05/2018   • Vaginal pain 06/27/2017   • Prediabetes 04/20/2017   • Mild hyperlipidemia 04/20/2017   • Abnormal drug screen 04/20/2017   • Stress at home 04/20/2017   • History of tobacco use disorder 03/30/2017   • Pain of both hip joints 02/01/2017   • Chronic right shoulder pain 02/01/2017   • Bipolar 2 disorder, major depressive episode (HCC) 05/03/2016   • Insomnia 05/03/2016   • Bilateral low back pain with right-sided sciatica 03/04/2016   • Anxiety 03/04/2016       Allergies:Tetanus toxoids    Current Outpatient Medications   Medication Sig Dispense Refill   • lamotrigine (LAMICTAL) 150 MG tablet Take 1 Tab by mouth every day.     • venlafaxine XR (EFFEXOR XR) 37.5 MG CAPSULE SR 24 HR Take 1 Cap by mouth every day.     • doxepin (SINEQUAN) 25 MG Cap Take 2 Caps by mouth every bedtime.     • hydrocortisone 1 % Cream Apply small amount to affected areas twice a day. 14 g 1   • cetirizine (ZYRTEC) 10 MG Tab Take 1 Tab by mouth every day. 90 Tab 2   • lisinopril-hydrochlorothiazide (PRINZIDE) 20-25 MG per tablet TAKE ONE TABLET BY MOUTH DAILY 90 Tab 2   • amLODIPine (NORVASC) 2.5 MG Tab Take 1 Tab by mouth every day. 90 Tab 2   • albuterol 108 (90 Base) MCG/ACT Aero Soln inhalation aerosol Inhale 2 Puffs by mouth every 6 hours as needed for Shortness of Breath. 6.7 g 2   • oxybutynin SR (DITROPAN-XL) 5 MG TABLET SR 24 HR TAKE ONE TABLET BY MOUTH DAILY - DITROPAN XL 30 Tab 2   • cyclobenzaprine (FLEXERIL) 10 MG Tab   2   •  MG capsule   5   • busPIRone (BUSPAR) 10 MG Tab tablet   0   • meloxicam (MOBIC) 15 MG tablet TAKE ONE TABLET BY MOUTH DAILY 30 Tab 2   • hydrocodone/acetaminophen (NORCO)  MG Tab Take 1-2 Tabs by mouth every 6 hours as needed.     • hydrOXYzine HCl (ATARAX) 50 MG Tab Take 1 Tab by mouth 3 times a day.       No current facility-administered medications for this visit.        No visits with results within 6 Month(s) from this visit.   Latest known visit with results is:    Hospital Outpatient Visit on 2019   Component Date Value Ref Range Status   • Occult Blood, IA 2019 Negative  Negative Final   ]    The 10-year ASCVD risk score (Cornelio ROSENTHAL JrIla, et al., 2013) is: 4.4%    Values used to calculate the score:      Age: 59 years      Sex: Female      Is Non- : No      Diabetic: No      Tobacco smoker: No      Systolic Blood Pressure: 134 mmHg      Is BP treated: Yes      HDL Cholesterol: 46 mg/dL      Total Cholesterol: 174 mg/dL    Social History     Tobacco Use   • Smoking status: Former Smoker     Packs/day: 1.00     Years: 35.00     Pack years: 35.00     Types: Cigarettes     Quit date: 2018     Years since quittin.3   • Smokeless tobacco: Never Used   Substance Use Topics   • Alcohol use: Yes     Frequency: Monthly or less     Comment: 1 glass wine/mo   • Drug use: No     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    Health Maintenance Summary                HEPATITIS C SCREENING Overdue 1960     MAMMOGRAM Overdue 10/29/2000     IMM ZOSTER VACCINES Overdue 2018      Done 2018 Imm Admin: Recombinant Zoster Vaccine (RZV) (Shingrix)    PAP SMEAR Overdue 2020      Done 2017 THINPREP PAP W/HPV AND CTNG     Patient has more history with this topic...    IMM INFLUENZA Overdue 2020      Done 10/3/2019 Imm Admin: Influenza Vaccine Quad Inj (Pf)     Patient has more history with this topic...    COLON CANCER SCREENING ANNUAL FIT Next Due 2020      Done 2019 OCCULT BLOOD FECES IMMUNOASSAY     Patient has more history with this topic...     "IMM HEP B VACCINE Postponed 10/3/2020 Originally 10/29/1979. Patient Refused    IMM DTaP/Tdap/Td Vaccine Postponed 10/3/2020 Originally 10/29/1979. Patient Refused           Review of Systems:   Constitutional: Negative for fever, chills, weight change, fatigue, loss of appetite.  HNT: Positive for congestion.  Negative for nosebleeds, odynophagia, sore throat or changes in taste.    Eyes: Negative for vision changes.   Ears: Negative for recent changes in hearing, pain or discharge.  Neck: Negative for pain, swelling, lumps or goiter.  Respiratory: Positive for cough and shortness of breath.  Negative for sputum production, and wheezing.    Cardiovascular: Negative for chest pain, palpitations, orthopnea and leg swelling.   Gastrointestinal: Negative for constipation, diarrhea, heartburn, dysphagia, nausea, vomiting or abdominal pain.   Genitourinary: Positive for stress incontinence.  Negative for dysuria, urgency and frequency.   Musculoskeletal: Positive for joint pain, back pain.  Negative for myalgias.  Skin: Positive for skin lesions, itching.  Negative for skin, hair or nail changes.   Neurological: Positive for sudden headaches.  Negative for dizziness, tingling, tremors, sensory change, gait/coordination changes, focal weakness.   Endo/Heme/Allergies: Does not bruise/bleed easily.   Psychiatric/Behavioral: Positive for depression, anxiety, memory loss, and insomnia.  Negative for suicidal ideas.  The patient is not nervous/anxious.        Exam:  /84 (BP Location: Right arm, Patient Position: Sitting, BP Cuff Size: Large adult)   Pulse 100   Temp 36.2 °C (97.1 °F) (Temporal)   Resp 16   Ht 1.626 m (5' 4\")   Wt 105.2 kg (232 lb)   SpO2 95%  Body mass index is 39.82 kg/m².  General:  Well nourished, obese female. No apparent distress.  Eyes: EOM intact, PERRL, conjunctiva non-injected, sclera non-icteric.  Ears: Rula pinnae, external auditory canals, TM pearly gray with normal light reflex " bilaterally.  Neck: Supple with no cervical lymphadenopathy, JVD, palpable thyroid nodules or carotid bruits.  Pulmonary: Clear to ausculation bilaterally. Normal effort. No rales, ronchi, or wheezing.  Cardiovascular: Regular rate and rhythm without murmur, rub or gallop.   Extremities: Full range of motion with significant discomfort in the hips. Warm and well perfused with no edema.  Skin: Intact with no obvious rashes or lesions.  Neuro: Cranial nerves I-XII grossly intact.  Psych: Alert and oriented x 3.  Appropriately dressed. Mood and affect appropriate.      Assessment/Plan:  1. Essential hypertension  Comp Metabolic Panel    ESTIMATED GFR    lisinopril-hydrochlorothiazide (PRINZIDE) 20-25 MG per tablet    amLODIPine (NORVASC) 2.5 MG Tab   2. Mild hyperlipidemia  Lipid Profile   3. Prediabetes  ESTIMATED GFR   4. Anxiety  VITAMIN D,25 HYDROXY   5. Chronic non-seasonal allergic rhinitis  cetirizine (ZYRTEC) 10 MG Tab   6. Obesity (BMI 30-39.9)  Patient identified as having weight management issue.  Appropriate orders and counseling given.    Nutrition (Dietary) Consult   7. Bipolar 2 disorder, major depressive episode (HCC)  VITAMIN D,25 HYDROXY   8. Healthcare maintenance  CBC WITH DIFFERENTIAL    Comp Metabolic Panel    ESTIMATED GFR    Lipid Profile    VITAMIN D,25 HYDROXY    Shingles Vaccine (Shingrix)    HCV Scrn ( [id number] 1xLife)   9. Picking own skin  hydrocortisone 1 % Cream   10. Environmental and seasonal allergies  albuterol 108 (90 Base) MCG/ACT Aero Soln inhalation aerosol   11. Encounter for screening mammogram for breast cancer  MA-SCREENING MAMMO BILAT W/TOMOSYNTHESIS W/CAD   12. Screening for colorectal cancer  REFERRAL TO GI FOR COLONOSCOPY   13. Class 2 obesity due to excess calories without serious comorbidity with body mass index (BMI) of 39.0 to 39.9 in adult         Reviewed risks and benefits of treatment plan. Patient verbally agrees to plan of care.     Return in about 2 months  (around 11/2/2020).    Please note that this dictation was created using voice recognition software. I have made every reasonable attempt to correct obvious errors, but I expect that there are errors of grammar and possibly content that I did not discover before finalizing the note.

## 2020-09-02 NOTE — ASSESSMENT & PLAN NOTE
Not checked since 2018 when triglycerides were high and all other results normal. No medications prescribed.  We discussed controlling this with diet and exercise as tolerated due to her other chronic conditions.  I have placed a consult for nutrition counseling for weight loss and cholesterol management.  Labs ordered today.  We will continue to follow.

## 2020-09-02 NOTE — ASSESSMENT & PLAN NOTE
Followed by [facility].  Currently taking Lamictal 100 mg daily, buspirone 10 mg daily, hydroxyzine 25 mg 3 times daily as needed.  They will continue to manage, we will continue to follow.

## 2020-09-02 NOTE — ASSESSMENT & PLAN NOTE
This is a chronic condition for this patient due to excessive caloric intake.  Weight today 232 pounds down 12 pounds from her weight in from January.  Nutrition consult placed.  Recommended that both her and her  attend the nutrition appointment as both do cooking in the house.  We will continue to follow.

## 2020-09-02 NOTE — ASSESSMENT & PLAN NOTE
Patient is due for routine lab work, colonoscopy, mammogram, second shingles shot, and Pap smear.  Referrals been placed today for mammogram and colonoscopy.  Shingles shot has been ordered. She sees Dr. Fahad Zamorano with [facility].  She will make an appointment with them for her Pap smear.  We will continue to follow her health maintenance issues.

## 2020-09-02 NOTE — ASSESSMENT & PLAN NOTE
Chronic condition currently controlled on Prinzide 20-25 mg daily, amlodipine 2.5 mg daily.  Pressure in the office today 134/84.  Continue on current medications at current dose, I refilled prescriptions today.

## 2020-09-02 NOTE — ASSESSMENT & PLAN NOTE
Patient has chronic itchy skin due to environmental and seasonal allergies.  She scratches until she gets sores, then she picks at them.  She currently takes Atarax 50 mg 3 times daily and hydrocortisone 1% to affected areas twice daily.  Continue medications at the current dose.  We will continue to monitor.

## 2020-10-08 RX ORDER — OXYBUTYNIN CHLORIDE 5 MG/1
TABLET, EXTENDED RELEASE ORAL
Qty: 30 TAB | Refills: 1 | Status: SHIPPED | OUTPATIENT
Start: 2020-10-08 | End: 2021-06-29

## 2020-12-17 ENCOUNTER — OFFICE VISIT (OUTPATIENT)
Dept: MEDICAL GROUP | Facility: CLINIC | Age: 60
End: 2020-12-17
Payer: MEDICAID

## 2020-12-17 VITALS
DIASTOLIC BLOOD PRESSURE: 90 MMHG | RESPIRATION RATE: 18 BRPM | HEIGHT: 64 IN | HEART RATE: 97 BPM | OXYGEN SATURATION: 96 % | SYSTOLIC BLOOD PRESSURE: 130 MMHG | WEIGHT: 233 LBS | TEMPERATURE: 98.7 F | BODY MASS INDEX: 39.78 KG/M2

## 2020-12-17 DIAGNOSIS — N39.46 MIXED STRESS AND URGE URINARY INCONTINENCE: ICD-10-CM

## 2020-12-17 DIAGNOSIS — I10 ESSENTIAL HYPERTENSION: ICD-10-CM

## 2020-12-17 DIAGNOSIS — Z23 IMMUNIZATION DUE: ICD-10-CM

## 2020-12-17 DIAGNOSIS — R73.03 PREDIABETES: ICD-10-CM

## 2020-12-17 PROBLEM — J30.89 CHRONIC NON-SEASONAL ALLERGIC RHINITIS: Status: RESOLVED | Noted: 2018-01-05 | Resolved: 2020-12-17

## 2020-12-17 PROBLEM — F43.9 STRESS AT HOME: Status: RESOLVED | Noted: 2017-04-20 | Resolved: 2020-12-17

## 2020-12-17 PROBLEM — R10.2 VAGINAL PAIN: Status: RESOLVED | Noted: 2017-06-27 | Resolved: 2020-12-17

## 2020-12-17 PROBLEM — R39.89 SENSATION OF PRESSURE IN BLADDER AREA: Status: RESOLVED | Noted: 2019-09-10 | Resolved: 2020-12-17

## 2020-12-17 PROBLEM — Z87.891 HISTORY OF TOBACCO USE DISORDER: Status: RESOLVED | Noted: 2017-03-30 | Resolved: 2020-12-17

## 2020-12-17 PROCEDURE — 99213 OFFICE O/P EST LOW 20 MIN: CPT | Mod: 25 | Performed by: PHYSICIAN ASSISTANT

## 2020-12-17 PROCEDURE — 90471 IMMUNIZATION ADMIN: CPT | Performed by: PHYSICIAN ASSISTANT

## 2020-12-17 PROCEDURE — 90686 IIV4 VACC NO PRSV 0.5 ML IM: CPT | Performed by: PHYSICIAN ASSISTANT

## 2020-12-17 RX ORDER — VENLAFAXINE HYDROCHLORIDE 37.5 MG/1
1 CAPSULE, EXTENDED RELEASE ORAL DAILY
COMMUNITY
Start: 2020-11-25 | End: 2022-11-11

## 2020-12-17 NOTE — PROGRESS NOTES
Chief Complaint   Patient presents with   • Requesting Labs     didnt get them done. needs new orders        HISTORY OF PRESENT ILLNESS: Patient is a 60 y.o. female established patient who presents today to discuss the following issues:    Prediabetes  Patient failed to have her lab work done before this visit.  She will have labs done early next week.    Essential hypertension  Chronic condition for this patient.  Currently well controlled.  She takes lisinopril-hydrochlorothiazide 20-25 mg daily, amlodipine 2.5 mg daily.  Blood pressure in the office today 130/90.  She will continue this medication at its current dose we will continue to follow.    Mixed stress and urge urinary incontinence  Patient in the office today requesting a referral to urology for evaluation and treatment.  She states that she was started on oxybutynin SR 5 mg daily.  Since that time she is having more difficulty with urination.  She states that she has to strain to urinate and that it takes her a very long time to empty her bladder.  Urgent referral has been placed to urology for evaluation.  We will follow-up after her visit with urology.      Patient Active Problem List    Diagnosis Date Noted   • Healthcare maintenance 09/02/2020   • Environmental and seasonal allergies 09/02/2020   • Mixed stress and urge urinary incontinence 09/10/2019   • Class 2 obesity due to excess calories with body mass index (BMI) of 39.0 to 39.9 in adult 08/13/2019   • Picking own skin 08/13/2019   • Essential hypertension 04/10/2018   • Hot flashes due to menopause 04/10/2018   • Bilateral lower extremity edema 04/10/2018   • History of methamphetamine use 04/10/2018   • Uncomplicated opioid dependence (HCC) 04/10/2018   • Abnormal EKG 04/10/2018   • Pain management contract agreement 01/05/2018   • Decreased hearing of both ears 01/05/2018   • Prediabetes 04/20/2017   • Mild hyperlipidemia 04/20/2017   • Abnormal drug screen 04/20/2017   • Pain of both hip  joints 02/01/2017   • Chronic right shoulder pain 02/01/2017   • Bipolar 2 disorder, major depressive episode (HCC) 05/03/2016   • Insomnia 05/03/2016   • Bilateral low back pain with right-sided sciatica 03/04/2016   • Anxiety 03/04/2016       Allergies:Tetanus toxoids    Current Outpatient Medications   Medication Sig Dispense Refill   • oxybutynin SR (DITROPAN-XL) 5 MG TABLET SR 24 HR GENERIC FOR DITROPAN XL- TAKE ONE TABLET BY MOUTH DAILY 30 Tab 1   • lamotrigine (LAMICTAL) 150 MG tablet Take 1 Tab by mouth every day.     • hydrOXYzine HCl (ATARAX) 50 MG Tab Take 1 Tab by mouth 3 times a day.     • doxepin (SINEQUAN) 25 MG Cap Take 2 Caps by mouth every bedtime.     • hydrocortisone 1 % Cream Apply small amount to affected areas twice a day. 14 g 1   • cetirizine (ZYRTEC) 10 MG Tab Take 1 Tab by mouth every day. 90 Tab 2   • lisinopril-hydrochlorothiazide (PRINZIDE) 20-25 MG per tablet TAKE ONE TABLET BY MOUTH DAILY 90 Tab 2   • amLODIPine (NORVASC) 2.5 MG Tab Take 1 Tab by mouth every day. 90 Tab 2   • albuterol 108 (90 Base) MCG/ACT Aero Soln inhalation aerosol Inhale 2 Puffs by mouth every 6 hours as needed for Shortness of Breath. 6.7 g 2   • cyclobenzaprine (FLEXERIL) 10 MG Tab   2   •  MG capsule   5   • busPIRone (BUSPAR) 10 MG Tab tablet   0   • meloxicam (MOBIC) 15 MG tablet TAKE ONE TABLET BY MOUTH DAILY 30 Tab 2   • hydrocodone/acetaminophen (NORCO)  MG Tab Take 1-2 Tabs by mouth every 6 hours as needed.     • venlafaxine XR (EFFEXOR XR) 37.5 MG CAPSULE SR 24 HR Take 1 Cap by mouth every day.       No current facility-administered medications for this visit.        No visits with results within 6 Month(s) from this visit.   Latest known visit with results is:   Hospital Outpatient Visit on 09/17/2019   Component Date Value Ref Range Status   • Occult Blood, IA 09/17/2019 Negative  Negative Final   ]    The 10-year ASCVD risk score (Guatay GALO Jr., et al., 2013) is: 4.6%    Values used to  calculate the score:      Age: 60 years      Sex: Female      Is Non- : No      Diabetic: No      Tobacco smoker: No      Systolic Blood Pressure: 130 mmHg      Is BP treated: Yes      HDL Cholesterol: 46 mg/dL      Total Cholesterol: 174 mg/dL    Social History     Tobacco Use   • Smoking status: Former Smoker     Packs/day: 1.00     Years: 35.00     Pack years: 35.00     Types: Cigarettes     Quit date: 2018     Years since quittin.6   • Smokeless tobacco: Never Used   Substance Use Topics   • Alcohol use: Yes     Frequency: Monthly or less     Comment: 1 glass wine/mo   • Drug use: No     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    Health Maintenance Summary                HEPATITIS C SCREENING Overdue 1960     IMM HEP B VACCINE Overdue 10/29/1979     IMM DTaP/Tdap/Td Vaccine Overdue 10/29/1979     MAMMOGRAM Overdue 10/29/2000     IMM ZOSTER VACCINES Overdue 2018      Done 2018 Imm Admin: Zoster Vaccine Recombinant (RZV) (SHINGRIX)    PAP SMEAR Overdue 2020      Done 2017 THINPREP PAP W/HPV AND CTNG     Patient has more history with this topic...    COLON CANCER SCREENING ANNUAL FIT Overdue 2020      Done 2019 OCCULT BLOOD FECES IMMUNOASSAY     Patient has more history with this topic...           Review of Systems:   Constitutional: Negative for fever, chills, weight change, fatigue, loss of appetite.  HNT: Negative for nosebleeds, congestion, odynophagia, sore throat or changes in taste.    Eyes: Negative for vision changes.   Ears: Negative for recent changes in hearing,  "pain or discharge.  Neck: Negative for pain, swelling, lumps or goiter.  Respiratory: Negative for cough, sputum production, shortness of breath and wheezing.    Cardiovascular: Negative for chest pain, palpitations, orthopnea and leg swelling.   Gastrointestinal: Negative for constipation, diarrhea, heartburn, dysphagia, nausea, vomiting or abdominal pain.   Genitourinary: Positive for difficulty urinating.  Negative for dysuria, urgency and frequency.   Musculoskeletal: Negative for myalgias, joint pain, and back pain.  Skin: Negative for skin, hair or nail changes, rash, itching.   Neurological: Negative for dizziness, tingling, tremors, sensory change, gait/coordination changes, focal weakness and headaches.   Endo/Heme/Allergies: Does not bruise/bleed easily.   Psychiatric/Behavioral: Positive for bipolar 2 disorder with depression, insomnia.  Negative for suicidal ideas and memory loss.  The patient is not nervous/anxious.        Exam:  /90 (BP Location: Right arm, Patient Position: Sitting, BP Cuff Size: Large adult)   Pulse 97   Temp 37.1 °C (98.7 °F) (Temporal)   Resp 18   Ht 1.626 m (5' 4\")   Wt 105.7 kg (233 lb)   SpO2 96%  Body mass index is 39.99 kg/m².  General:  Well nourished, obese female. No apparent distress.  Eyes: EOM intact, PERRL, conjunctiva non-injected, sclera non-icteric.  Ears: Rula pinnae, external auditory canals, TM pearly gray with normal light reflex bilaterally.  Neck: Supple with no cervical lymphadenopathy, JVD, palpable thyroid nodules or carotid bruits.  Pulmonary: Clear to ausculation bilaterally. Normal effort. No rales, ronchi, or wheezing.  Cardiovascular: Regular rate and rhythm without murmur, rub or gallop.   Extremities: Full range of motion. Warm and well perfused with no edema.  Skin: Intact with no obvious rashes or lesions.  Neuro: Cranial nerves I-XII intact.  Psych: Alert and oriented x 3.  Appropriately dressed. Mood and affect " appropriate.      Assessment/Plan:  1. Essential hypertension     2. Mixed stress and urge urinary incontinence  REFERRAL TO UROLOGY   3. Prediabetes  HEMOGLOBIN A1C   4. Immunization due  Influenza Vaccine Quad Injection (PF)       Reviewed risks and benefits of treatment plan. Patient verbally agrees to plan of care.     Return in about 5 weeks (around 1/21/2021).    Please note that this dictation was created using voice recognition software. I have made every reasonable attempt to correct obvious errors, but I expect that there are errors of grammar and possibly content that I did not discover before finalizing the note.

## 2020-12-18 NOTE — ASSESSMENT & PLAN NOTE
Patient in the office today requesting a referral to urology for evaluation and treatment.  She states that she was started on oxybutynin SR 5 mg daily.  Since that time she is having more difficulty with urination.  She states that she has to strain to urinate and that it takes her a very long time to empty her bladder.  Urgent referral has been placed to urology for evaluation.  We will follow-up after her visit with urology.

## 2020-12-18 NOTE — ASSESSMENT & PLAN NOTE
Patient failed to have her lab work done before this visit.  She will have labs done early next week.

## 2020-12-18 NOTE — ASSESSMENT & PLAN NOTE
Chronic condition for this patient.  Currently well controlled.  She takes lisinopril-hydrochlorothiazide 20-25 mg daily, amlodipine 2.5 mg daily.  Blood pressure in the office today 130/90.  She will continue this medication at its current dose we will continue to follow.

## 2021-01-11 ENCOUNTER — NON-PROVIDER VISIT (OUTPATIENT)
Dept: MEDICAL GROUP | Facility: CLINIC | Age: 61
End: 2021-01-11
Payer: MEDICAID

## 2021-01-11 ENCOUNTER — HOSPITAL ENCOUNTER (OUTPATIENT)
Facility: MEDICAL CENTER | Age: 61
End: 2021-01-11
Attending: PSYCHIATRY & NEUROLOGY
Payer: MEDICAID

## 2021-01-11 ENCOUNTER — HOSPITAL ENCOUNTER (OUTPATIENT)
Facility: MEDICAL CENTER | Age: 61
End: 2021-01-11
Attending: PHYSICIAN ASSISTANT
Payer: MEDICAID

## 2021-01-11 PROCEDURE — 80053 COMPREHEN METABOLIC PANEL: CPT

## 2021-01-11 PROCEDURE — 85025 COMPLETE CBC W/AUTO DIFF WBC: CPT | Mod: 91

## 2021-01-11 PROCEDURE — 80053 COMPREHEN METABOLIC PANEL: CPT | Mod: 91

## 2021-01-11 PROCEDURE — 83036 HEMOGLOBIN GLYCOSYLATED A1C: CPT

## 2021-01-11 PROCEDURE — 84443 ASSAY THYROID STIM HORMONE: CPT

## 2021-01-11 PROCEDURE — 85025 COMPLETE CBC W/AUTO DIFF WBC: CPT

## 2021-01-11 PROCEDURE — 80061 LIPID PANEL: CPT | Mod: 91

## 2021-01-11 PROCEDURE — 84439 ASSAY OF FREE THYROXINE: CPT

## 2021-01-11 PROCEDURE — G0472 HEP C SCREEN HIGH RISK/OTHER: HCPCS

## 2021-01-11 PROCEDURE — 82306 VITAMIN D 25 HYDROXY: CPT

## 2021-01-11 PROCEDURE — 80061 LIPID PANEL: CPT

## 2021-01-11 PROCEDURE — 36415 COLL VENOUS BLD VENIPUNCTURE: CPT | Performed by: PHYSICIAN ASSISTANT

## 2021-01-12 DIAGNOSIS — F31.81 BIPOLAR 2 DISORDER, MAJOR DEPRESSIVE EPISODE (HCC): ICD-10-CM

## 2021-01-12 DIAGNOSIS — E78.5 MILD HYPERLIPIDEMIA: ICD-10-CM

## 2021-01-12 DIAGNOSIS — F41.9 ANXIETY: ICD-10-CM

## 2021-01-12 DIAGNOSIS — Z00.00 HEALTHCARE MAINTENANCE: ICD-10-CM

## 2021-01-12 DIAGNOSIS — I10 ESSENTIAL HYPERTENSION: ICD-10-CM

## 2021-01-12 DIAGNOSIS — R73.03 PREDIABETES: ICD-10-CM

## 2021-01-12 LAB
25(OH)D3 SERPL-MCNC: 38 NG/ML (ref 30–100)
ALBUMIN SERPL BCP-MCNC: 4.6 G/DL (ref 3.2–4.9)
ALBUMIN SERPL BCP-MCNC: 4.6 G/DL (ref 3.2–4.9)
ALBUMIN/GLOB SERPL: 1.5 G/DL
ALBUMIN/GLOB SERPL: 1.5 G/DL
ALP SERPL-CCNC: 76 U/L (ref 30–99)
ALP SERPL-CCNC: 78 U/L (ref 30–99)
ALT SERPL-CCNC: 22 U/L (ref 2–50)
ALT SERPL-CCNC: 24 U/L (ref 2–50)
ANION GAP SERPL CALC-SCNC: 15 MMOL/L (ref 7–16)
ANION GAP SERPL CALC-SCNC: 16 MMOL/L (ref 7–16)
AST SERPL-CCNC: 21 U/L (ref 12–45)
AST SERPL-CCNC: 23 U/L (ref 12–45)
BASOPHILS # BLD AUTO: 0.5 % (ref 0–1.8)
BASOPHILS # BLD AUTO: 0.6 % (ref 0–1.8)
BASOPHILS # BLD: 0.04 K/UL (ref 0–0.12)
BASOPHILS # BLD: 0.05 K/UL (ref 0–0.12)
BILIRUB SERPL-MCNC: 0.3 MG/DL (ref 0.1–1.5)
BILIRUB SERPL-MCNC: 0.3 MG/DL (ref 0.1–1.5)
BUN SERPL-MCNC: 19 MG/DL (ref 8–22)
BUN SERPL-MCNC: 20 MG/DL (ref 8–22)
CALCIUM SERPL-MCNC: 10.1 MG/DL (ref 8.5–10.5)
CALCIUM SERPL-MCNC: 9.9 MG/DL (ref 8.5–10.5)
CHLORIDE SERPL-SCNC: 98 MMOL/L (ref 96–112)
CHLORIDE SERPL-SCNC: 99 MMOL/L (ref 96–112)
CHOLEST SERPL-MCNC: 201 MG/DL (ref 100–199)
CHOLEST SERPL-MCNC: 202 MG/DL (ref 100–199)
CO2 SERPL-SCNC: 23 MMOL/L (ref 20–33)
CO2 SERPL-SCNC: 24 MMOL/L (ref 20–33)
CREAT SERPL-MCNC: 0.9 MG/DL (ref 0.5–1.4)
CREAT SERPL-MCNC: 0.96 MG/DL (ref 0.5–1.4)
EOSINOPHIL # BLD AUTO: 0.16 K/UL (ref 0–0.51)
EOSINOPHIL # BLD AUTO: 0.18 K/UL (ref 0–0.51)
EOSINOPHIL NFR BLD: 1.8 % (ref 0–6.9)
EOSINOPHIL NFR BLD: 2.1 % (ref 0–6.9)
ERYTHROCYTE [DISTWIDTH] IN BLOOD BY AUTOMATED COUNT: 45.5 FL (ref 35.9–50)
ERYTHROCYTE [DISTWIDTH] IN BLOOD BY AUTOMATED COUNT: 46.5 FL (ref 35.9–50)
EST. AVERAGE GLUCOSE BLD GHB EST-MCNC: 111 MG/DL
GLOBULIN SER CALC-MCNC: 3.1 G/DL (ref 1.9–3.5)
GLOBULIN SER CALC-MCNC: 3.1 G/DL (ref 1.9–3.5)
GLUCOSE SERPL-MCNC: 101 MG/DL (ref 65–99)
GLUCOSE SERPL-MCNC: 104 MG/DL (ref 65–99)
HBA1C MFR BLD: 5.5 % (ref 0–5.6)
HCT VFR BLD AUTO: 44 % (ref 37–47)
HCT VFR BLD AUTO: 45.2 % (ref 37–47)
HCV AB SER QL: NORMAL
HDLC SERPL-MCNC: 50 MG/DL
HDLC SERPL-MCNC: 51 MG/DL
HGB BLD-MCNC: 14.7 G/DL (ref 12–16)
HGB BLD-MCNC: 14.8 G/DL (ref 12–16)
IMM GRANULOCYTES # BLD AUTO: 0.03 K/UL (ref 0–0.11)
IMM GRANULOCYTES # BLD AUTO: 0.04 K/UL (ref 0–0.11)
IMM GRANULOCYTES NFR BLD AUTO: 0.3 % (ref 0–0.9)
IMM GRANULOCYTES NFR BLD AUTO: 0.5 % (ref 0–0.9)
LDLC SERPL CALC-MCNC: 129 MG/DL
LDLC SERPL CALC-MCNC: 131 MG/DL
LYMPHOCYTES # BLD AUTO: 4.34 K/UL (ref 1–4.8)
LYMPHOCYTES # BLD AUTO: 4.53 K/UL (ref 1–4.8)
LYMPHOCYTES NFR BLD: 49.7 % (ref 22–41)
LYMPHOCYTES NFR BLD: 50.5 % (ref 22–41)
MCH RBC QN AUTO: 29.6 PG (ref 27–33)
MCH RBC QN AUTO: 30.3 PG (ref 27–33)
MCHC RBC AUTO-ENTMCNC: 32.5 G/DL (ref 33.6–35)
MCHC RBC AUTO-ENTMCNC: 33.6 G/DL (ref 33.6–35)
MCV RBC AUTO: 90.2 FL (ref 81.4–97.8)
MCV RBC AUTO: 90.9 FL (ref 81.4–97.8)
MONOCYTES # BLD AUTO: 0.37 K/UL (ref 0–0.85)
MONOCYTES # BLD AUTO: 0.41 K/UL (ref 0–0.85)
MONOCYTES NFR BLD AUTO: 4.1 % (ref 0–13.4)
MONOCYTES NFR BLD AUTO: 4.7 % (ref 0–13.4)
NEUTROPHILS # BLD AUTO: 3.72 K/UL (ref 2–7.15)
NEUTROPHILS # BLD AUTO: 3.83 K/UL (ref 2–7.15)
NEUTROPHILS NFR BLD: 42.5 % (ref 44–72)
NEUTROPHILS NFR BLD: 42.7 % (ref 44–72)
NRBC # BLD AUTO: 0 K/UL
NRBC # BLD AUTO: 0.02 K/UL
NRBC BLD-RTO: 0 /100 WBC
NRBC BLD-RTO: 0.2 /100 WBC
PLATELET # BLD AUTO: 323 K/UL (ref 164–446)
PLATELET # BLD AUTO: 339 K/UL (ref 164–446)
PMV BLD AUTO: 8.8 FL (ref 9–12.9)
PMV BLD AUTO: 9.1 FL (ref 9–12.9)
POTASSIUM SERPL-SCNC: 4.1 MMOL/L (ref 3.6–5.5)
POTASSIUM SERPL-SCNC: 4.8 MMOL/L (ref 3.6–5.5)
PROT SERPL-MCNC: 7.7 G/DL (ref 6–8.2)
PROT SERPL-MCNC: 7.7 G/DL (ref 6–8.2)
RBC # BLD AUTO: 4.88 M/UL (ref 4.2–5.4)
RBC # BLD AUTO: 4.97 M/UL (ref 4.2–5.4)
SODIUM SERPL-SCNC: 137 MMOL/L (ref 135–145)
SODIUM SERPL-SCNC: 138 MMOL/L (ref 135–145)
T4 FREE SERPL-MCNC: 0.96 NG/DL (ref 0.93–1.7)
TRIGL SERPL-MCNC: 104 MG/DL (ref 0–149)
TRIGL SERPL-MCNC: 107 MG/DL (ref 0–149)
TSH SERPL DL<=0.005 MIU/L-ACNC: 3.55 UIU/ML (ref 0.38–5.33)
WBC # BLD AUTO: 8.7 K/UL (ref 4.8–10.8)
WBC # BLD AUTO: 9 K/UL (ref 4.8–10.8)

## 2021-02-10 ENCOUNTER — OFFICE VISIT (OUTPATIENT)
Dept: MEDICAL GROUP | Facility: CLINIC | Age: 61
End: 2021-02-10
Payer: MEDICAID

## 2021-02-10 VITALS
OXYGEN SATURATION: 97 % | TEMPERATURE: 97.8 F | SYSTOLIC BLOOD PRESSURE: 140 MMHG | BODY MASS INDEX: 39.44 KG/M2 | RESPIRATION RATE: 18 BRPM | WEIGHT: 231 LBS | HEART RATE: 86 BPM | HEIGHT: 64 IN | DIASTOLIC BLOOD PRESSURE: 84 MMHG

## 2021-02-10 DIAGNOSIS — Z00.00 HEALTHCARE MAINTENANCE: ICD-10-CM

## 2021-02-10 DIAGNOSIS — Z12.12 SCREENING FOR COLORECTAL CANCER: ICD-10-CM

## 2021-02-10 DIAGNOSIS — E78.5 MILD HYPERLIPIDEMIA: ICD-10-CM

## 2021-02-10 DIAGNOSIS — I10 ESSENTIAL HYPERTENSION: ICD-10-CM

## 2021-02-10 DIAGNOSIS — Z23 IMMUNIZATION DUE: ICD-10-CM

## 2021-02-10 DIAGNOSIS — R73.03 PREDIABETES: ICD-10-CM

## 2021-02-10 DIAGNOSIS — K21.9 GASTROESOPHAGEAL REFLUX DISEASE WITHOUT ESOPHAGITIS: ICD-10-CM

## 2021-02-10 DIAGNOSIS — Z12.11 SCREENING FOR COLORECTAL CANCER: ICD-10-CM

## 2021-02-10 PROCEDURE — 99213 OFFICE O/P EST LOW 20 MIN: CPT | Performed by: PHYSICIAN ASSISTANT

## 2021-02-10 RX ORDER — OMEPRAZOLE 20 MG/1
20 CAPSULE, DELAYED RELEASE ORAL DAILY
Qty: 90 CAPSULE | Refills: 3 | Status: SHIPPED | OUTPATIENT
Start: 2021-02-10 | End: 2022-02-28

## 2021-02-10 ASSESSMENT — FIBROSIS 4 INDEX: FIB4 SCORE: 0.91

## 2021-02-10 NOTE — ASSESSMENT & PLAN NOTE
Patient states that she has been on cimetidine for her chronic GERD but that it is not controlling her symptoms well.  She states that before she was on famotidine 20 mg twice daily and that worked well for her but she was taken off of it.  She is asking if there is another medication that she can try to help control her symptoms.  We will start her on omeprazole 20 mg daily.  We will follow-up with her in 4 to 6 weeks to see if this is controlling her symptoms adequately.

## 2021-02-10 NOTE — PATIENT INSTRUCTIONS
Referral information sent to the following:  Urology     [facility]  [address] [address]  Ph: [phone]  Fax: [phone]

## 2021-02-11 ENCOUNTER — HOSPITAL ENCOUNTER (OUTPATIENT)
Facility: MEDICAL CENTER | Age: 61
End: 2021-02-11
Attending: PHYSICIAN ASSISTANT
Payer: MEDICAID

## 2021-02-11 PROCEDURE — 82274 ASSAY TEST FOR BLOOD FECAL: CPT

## 2021-02-11 NOTE — ASSESSMENT & PLAN NOTE
Patient is due for her second zoster vaccination.  We are out of zoster vaccine in the clinic.  A prescription has been written for her to take to the pharmacy for her second dose of the vaccine.

## 2021-02-11 NOTE — ASSESSMENT & PLAN NOTE
Most recent lipid panel done 1/12/2021 total cholesterol 202, triglycerides 104, HDL 50, .  She has done some diet modification and started following a diabetic diet that her  is on.  We had the discussion about watching her fat intake and getting some sort of physical activity in every day.  She is willing to work on this.  Her cholesterol does not warrant medication at this time.  We will follow-up in 1 year with repeat labs.

## 2021-02-11 NOTE — ASSESSMENT & PLAN NOTE
Chronic condition for this patient well controlled on Prinzide 20-25 mg daily, amlodipine 2.5 mg daily.  Blood pressure in the office today 140/84.  Patient's  has just been released from the hospital after a prolonged stay for diabetes complications.  She has been very stressed taking care of him since he got home.  She is doing her best to keep up with her diet and exercise.  We will continue to monitor.  She will continue her current medications at the current dose.

## 2021-02-11 NOTE — ASSESSMENT & PLAN NOTE
This is been a problem for this patient in the past. Her hemoglobin A1c done 7/9/2018 was at 6.1%.  Her most recent hemoglobin A1c done 1/16/2021 is now at 5.5%.  I have encouraged her to continue what she is doing as it has done wonders for her A1c.  Her  is diabetic and she is now following the same diet that he is on and this is helping her.  We will recheck her labs in 1 year.

## 2021-02-16 DIAGNOSIS — Z12.12 SCREENING FOR COLORECTAL CANCER: ICD-10-CM

## 2021-02-16 DIAGNOSIS — Z12.11 SCREENING FOR COLORECTAL CANCER: ICD-10-CM

## 2021-02-19 LAB — IMM ASSAY OCC BLD FITOB: NEGATIVE

## 2021-03-16 ENCOUNTER — APPOINTMENT (OUTPATIENT)
Dept: MEDICAL GROUP | Facility: CLINIC | Age: 61
End: 2021-03-16
Payer: MEDICAID

## 2021-05-28 DIAGNOSIS — I10 ESSENTIAL HYPERTENSION: ICD-10-CM

## 2021-05-28 RX ORDER — LISINOPRIL AND HYDROCHLOROTHIAZIDE 25; 20 MG/1; MG/1
TABLET ORAL
Qty: 90 TABLET | Refills: 1 | Status: SHIPPED | OUTPATIENT
Start: 2021-05-28 | End: 2021-09-28 | Stop reason: SDUPTHER

## 2021-05-28 NOTE — TELEPHONE ENCOUNTER
Was the patient seen in the last year in this department? Yes   LOV 02/10/2021    Does patient have an active prescription for medications requested? Yes    Received Request Via: Pharmacy    Hospital Outpatient Visit on 02/11/2021   Component Date Value   • Occult Blood, IA 02/11/2021 Negative    Hospital Outpatient Visit on 01/11/2021   Component Date Value   • Sodium 01/11/2021 137    • Potassium 01/11/2021 4.1    • Chloride 01/11/2021 99    • Co2 01/11/2021 23    • Anion Gap 01/11/2021 15.0    • Glucose 01/11/2021 104*   • Bun 01/11/2021 20    • Creatinine 01/11/2021 0.96    • Calcium 01/11/2021 9.9    • AST(SGOT) 01/11/2021 23    • ALT(SGPT) 01/11/2021 22    • Alkaline Phosphatase 01/11/2021 78    • Total Bilirubin 01/11/2021 0.3    • Albumin 01/11/2021 4.6    • Total Protein 01/11/2021 7.7    • Globulin 01/11/2021 3.1    • A-G Ratio 01/11/2021 1.5    • Cholesterol,Tot 01/11/2021 201*   • Triglycerides 01/11/2021 107    • HDL 01/11/2021 51    • LDL 01/11/2021 129*   • WBC 01/11/2021 9.0    • RBC 01/11/2021 4.88    • Hemoglobin 01/11/2021 14.8    • Hematocrit 01/11/2021 44.0    • MCV 01/11/2021 90.2    • MCH 01/11/2021 30.3    • MCHC 01/11/2021 33.6    • RDW 01/11/2021 45.5    • Platelet Count 01/11/2021 323    • MPV 01/11/2021 8.8*   • Neutrophils-Polys 01/11/2021 42.70*   • Lymphocytes 01/11/2021 50.50*   • Monocytes 01/11/2021 4.10    • Eosinophils 01/11/2021 1.80    • Basophils 01/11/2021 0.60    • Immature Granulocytes 01/11/2021 0.30    • Nucleated RBC 01/11/2021 0.00    • Neutrophils (Absolute) 01/11/2021 3.83    • Lymphs (Absolute) 01/11/2021 4.53    • Monos (Absolute) 01/11/2021 0.37    • Eos (Absolute) 01/11/2021 0.16    • Baso (Absolute) 01/11/2021 0.05    • Immature Granulocytes (a* 01/11/2021 0.03    • NRBC (Absolute) 01/11/2021 0.00    • Glycohemoglobin 01/11/2021 5.5    • Est Avg Glucose 01/11/2021 111    • TSH 01/11/2021 3.550    • Free T-4 01/11/2021 0.96    • GFR If  01/11/2021  >60    • GFR If Non  [employer]* 01/11/2021 59*   Hospital Outpatient Visit on 01/11/2021   Component Date Value   • Cholesterol,Tot 01/11/2021 202*   • Triglycerides 01/11/2021 104    • HDL 01/11/2021 50    • LDL 01/11/2021 131*   • GFR If  01/11/2021 >60    • GFR If Non  [employer]* 01/11/2021 >60    • Sodium 01/11/2021 138    • Potassium 01/11/2021 4.8    • Chloride 01/11/2021 98    • Co2 01/11/2021 24    • Anion Gap 01/11/2021 16.0    • Glucose 01/11/2021 101*   • Bun 01/11/2021 19    • Creatinine 01/11/2021 0.90    • Calcium 01/11/2021 10.1    • AST(SGOT) 01/11/2021 21    • ALT(SGPT) 01/11/2021 24    • Alkaline Phosphatase 01/11/2021 76    • Total Bilirubin 01/11/2021 0.3    • Albumin 01/11/2021 4.6    • Total Protein 01/11/2021 7.7    • Globulin 01/11/2021 3.1    • A-G Ratio 01/11/2021 1.5    • WBC 01/11/2021 8.7    • RBC 01/11/2021 4.97    • Hemoglobin 01/11/2021 14.7    • Hematocrit 01/11/2021 45.2    • MCV 01/11/2021 90.9    • MCH 01/11/2021 29.6    • MCHC 01/11/2021 32.5*   • RDW 01/11/2021 46.5    • Platelet Count 01/11/2021 339    • MPV 01/11/2021 9.1    • Neutrophils-Polys 01/11/2021 42.50*   • Lymphocytes 01/11/2021 49.70*   • Monocytes 01/11/2021 4.70    • Eosinophils 01/11/2021 2.10    • Basophils 01/11/2021 0.50    • Immature Granulocytes 01/11/2021 0.50    • Nucleated RBC 01/11/2021 0.20    • Neutrophils (Absolute) 01/11/2021 3.72    • Lymphs (Absolute) 01/11/2021 4.34    • Monos (Absolute) 01/11/2021 0.41    • Eos (Absolute) 01/11/2021 0.18    • Baso (Absolute) 01/11/2021 0.04    • Immature Granulocytes (a* 01/11/2021 0.04    • NRBC (Absolute) 01/11/2021 0.02    • Hepatitis C Antibody 01/11/2021 Non-Reactive    • 25-Hydroxy   Vitamin D 25 01/11/2021 38    ]

## 2021-06-29 RX ORDER — OXYBUTYNIN CHLORIDE 5 MG/1
TABLET, EXTENDED RELEASE ORAL
Qty: 30 TABLET | Refills: 0 | Status: SHIPPED | OUTPATIENT
Start: 2021-06-29 | End: 2021-08-02

## 2021-07-07 ENCOUNTER — OFFICE VISIT (OUTPATIENT)
Dept: MEDICAL GROUP | Facility: CLINIC | Age: 61
End: 2021-07-07
Payer: MEDICAID

## 2021-07-07 ENCOUNTER — HOSPITAL ENCOUNTER (OUTPATIENT)
Facility: MEDICAL CENTER | Age: 61
End: 2021-07-07
Attending: PHYSICIAN ASSISTANT
Payer: MEDICAID

## 2021-07-07 VITALS
WEIGHT: 235.6 LBS | HEIGHT: 64 IN | OXYGEN SATURATION: 94 % | SYSTOLIC BLOOD PRESSURE: 136 MMHG | TEMPERATURE: 97.7 F | HEART RATE: 95 BPM | BODY MASS INDEX: 40.22 KG/M2 | RESPIRATION RATE: 16 BRPM | DIASTOLIC BLOOD PRESSURE: 82 MMHG

## 2021-07-07 DIAGNOSIS — F42.4 PICKING OWN SKIN: ICD-10-CM

## 2021-07-07 DIAGNOSIS — J30.89 ENVIRONMENTAL AND SEASONAL ALLERGIES: ICD-10-CM

## 2021-07-07 DIAGNOSIS — Z01.419 WELL WOMAN EXAM WITH ROUTINE GYNECOLOGICAL EXAM: ICD-10-CM

## 2021-07-07 DIAGNOSIS — I10 ESSENTIAL HYPERTENSION: ICD-10-CM

## 2021-07-07 DIAGNOSIS — J30.89 CHRONIC NON-SEASONAL ALLERGIC RHINITIS: ICD-10-CM

## 2021-07-07 PROCEDURE — 99396 PREV VISIT EST AGE 40-64: CPT | Performed by: PHYSICIAN ASSISTANT

## 2021-07-07 PROCEDURE — 87624 HPV HI-RISK TYP POOLED RSLT: CPT

## 2021-07-07 PROCEDURE — 88175 CYTOPATH C/V AUTO FLUID REDO: CPT

## 2021-07-07 RX ORDER — AMLODIPINE BESYLATE 2.5 MG/1
2.5 TABLET ORAL DAILY
Qty: 90 TABLET | Refills: 2 | Status: SHIPPED | OUTPATIENT
Start: 2021-07-07 | End: 2022-06-30 | Stop reason: SDUPTHER

## 2021-07-07 RX ORDER — ALBUTEROL SULFATE 90 UG/1
2 AEROSOL, METERED RESPIRATORY (INHALATION) EVERY 6 HOURS PRN
Qty: 6.7 G | Refills: 2 | Status: SHIPPED | OUTPATIENT
Start: 2021-07-07 | End: 2022-01-11

## 2021-07-07 RX ORDER — BENZOCAINE/MENTHOL 6 MG-10 MG
LOZENGE MUCOUS MEMBRANE
Qty: 26 G | Refills: 1 | Status: SHIPPED | OUTPATIENT
Start: 2021-07-07 | End: 2021-10-28

## 2021-07-07 RX ORDER — CETIRIZINE HYDROCHLORIDE 10 MG/1
10 TABLET ORAL DAILY
Qty: 90 TABLET | Refills: 2 | Status: SHIPPED | OUTPATIENT
Start: 2021-07-07 | End: 2022-06-17

## 2021-07-07 ASSESSMENT — FIBROSIS 4 INDEX: FIB4 SCORE: 0.91

## 2021-07-07 NOTE — ASSESSMENT & PLAN NOTE
Due for well woman exam with pap. Last pap 4 years ago. Remote history of abnormal pap in 1980. Normal since.

## 2021-07-07 NOTE — PROGRESS NOTES
Subjective:     CC:   Chief Complaint   Patient presents with   • Gynecologic Exam       HPI:   Denisse Ramirez is a 60 y.o. female who presents for annual exam    Patient has GYN provider: No   Last Pap Smear: 2017   H/O Abnormal Pap: Yes, froze suspicious cells. Nothing since.   Last Mammogram:   Last Bone Density Test: Never  Last Colorectal Cancer Screenin21 - Negative  Last Tdap: Has an allergy to vaccine  Received HPV series: Aged out    Exercise: no regular exercise, sedentary  Diet: Tries to be healthy but could be better      Patient's last menstrual period was 2010 (within months).  She has not utilized hormone replacement therapy.  Reports hot flashes, night sweats, sleep disruption, mood changes and vaginal dryness  No significant bloating/fluid retention, pelvic pain, or dyspareunia. No abnormal vaginal discharge.   No breast tenderness, mass, nipple discharge or changes in size or contour.    OB History    Para Term  AB Living   3 3 3 0 0 3   SAB TAB Ectopic Molar Multiple Live Births   0 0 0 0 0 3      She  reports previously being sexually active and has had partner(s) who are Male.    She  has a past medical history of Anxiety, Arthritis, Chronic non-seasonal allergic rhinitis, Deep vein blood clot of right lower extremity ([facility]), Depression, Gastroesophageal reflux disease without esophagitis (2/10/2021), History of tobacco use disorder (3/30/2017), Hypertension, Insomnia, Muscle disorder, Sensation of pressure in bladder area (9/10/2019), Stress at home (2017), and Vaginal pain (2017). She also has no past medical history of Addisons disease ([facility]), Adrenal disorder ([facility]), Anemia, Arrhythmia, Asthma, Blood transfusion without reported diagnosis, Cancer ([facility]), Cataract, CHF (congestive heart failure) ([facility]), Clotting disorder ([facility]), COPD (chronic obstructive pulmonary disease) ([facility]), Cushings syndrome ([facility]), Diabetes ([facility]), Diabetic neuropathy  (HCC), Glaucoma, Goiter, Head ache, Heart attack (HCC), Heart murmur, HIV (human immunodeficiency virus infection) (HCC), IBD (inflammatory bowel disease), Kidney disease, Meningitis, Migraine, Osteoporosis, Parathyroid disorder (HCC), Pituitary disease (HCC), Pulmonary emphysema (HCC), Seizure (HCC), Sickle cell disease (HCC), Stroke (HCC), Substance abuse (HCC), Thyroid disease, Tuberculosis, or Urinary tract infection.  She  has a past surgical history that includes tonsillectomy and abdominal exploration.    Family History   Problem Relation Age of Onset   • Lung Disease Mother         emphysema   • Cancer Father    • Heart Disease Father    • Lung Disease Brother    • Lung Disease Brother      Social History     Tobacco Use   • Smoking status: Former Smoker     Packs/day: 1.00     Years: 35.00     Pack years: 35.00     Types: Cigarettes     Quit date: 4/17/2018     Years since quitting: 3.2   • Smokeless tobacco: Never Used   Vaping Use   • Vaping Use: Former   • Quit date: 9/2/2016   • Substances: CBD   Substance Use Topics   • Alcohol use: Yes     Comment: 1 glass wine/mo   • Drug use: No     Comment: 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       Patient Active Problem List    Diagnosis Date Noted   • Well woman exam with routine gynecological exam 07/07/2021   • Gastroesophageal reflux disease without esophagitis 02/10/2021   • Immunization due 02/10/2021   • Healthcare maintenance 09/02/2020   • Environmental and seasonal allergies 09/02/2020   • Mixed stress and urge urinary incontinence 09/10/2019   • Class 2 obesity due to excess calories with body mass index (BMI) of 39.0 to 39.9 in adult 08/13/2019   • Picking own skin 08/13/2019   • Essential hypertension 04/10/2018   • Hot flashes due to menopause 04/10/2018   • Bilateral lower extremity edema 04/10/2018   • History of methamphetamine use 04/10/2018   • Uncomplicated opioid dependence (HCC) 04/10/2018   • Abnormal EKG 04/10/2018   • Pain management  contract agreement 01/05/2018   • Decreased hearing of both ears 01/05/2018   • Prediabetes 04/20/2017   • Mild hyperlipidemia 04/20/2017   • Abnormal drug screen 04/20/2017   • Pain of both hip joints 02/01/2017   • Chronic right shoulder pain 02/01/2017   • Bipolar 2 disorder, major depressive episode (HCC) 05/03/2016   • Insomnia 05/03/2016   • Bilateral low back pain with right-sided sciatica 03/04/2016   • Anxiety 03/04/2016     Current Outpatient Medications   Medication Sig Dispense Refill   • oxybutynin SR (DITROPAN-XL) 5 MG TABLET SR 24 HR TAKE ONE TABLET BY MOUTH DAILY -- DITROPAN XL. 30 tablet 0   • lisinopril-hydrochlorothiazide (PRINZIDE) 20-25 MG per tablet TAKE ONE TABLET BY MOUTH DAILY  PRINZIDE 90 tablet 1   • omeprazole (PRILOSEC) 20 MG delayed-release capsule Take 1 capsule by mouth every day. 90 capsule 3   • venlafaxine XR (EFFEXOR XR) 37.5 MG CAPSULE SR 24 HR Take 1 Cap by mouth every day.     • lamotrigine (LAMICTAL) 150 MG tablet Take 1 Tab by mouth every day.     • hydrOXYzine HCl (ATARAX) 50 MG Tab Take 1 Tab by mouth 3 times a day.     • doxepin (SINEQUAN) 25 MG Cap Take 2 Caps by mouth every bedtime.     • hydrocortisone 1 % Cream Apply small amount to affected areas twice a day. 14 g 1   • cetirizine (ZYRTEC) 10 MG Tab Take 1 Tab by mouth every day. 90 Tab 2   • amLODIPine (NORVASC) 2.5 MG Tab Take 1 Tab by mouth every day. 90 Tab 2   • albuterol 108 (90 Base) MCG/ACT Aero Soln inhalation aerosol Inhale 2 Puffs by mouth every 6 hours as needed for Shortness of Breath. 6.7 g 2   • cyclobenzaprine (FLEXERIL) 10 MG Tab   2   •  MG capsule   5   • busPIRone (BUSPAR) 10 MG Tab tablet   0   • meloxicam (MOBIC) 15 MG tablet TAKE ONE TABLET BY MOUTH DAILY 30 Tab 2   • hydrocodone/acetaminophen (NORCO)  MG Tab Take 1-2 Tabs by mouth every 6 hours as needed.       No current facility-administered medications for this visit.     Allergies   Allergen Reactions   • Tetanus Toxoids   "      Review of Systems   Constitutional: Negative for fever, chills and malaise/fatigue.   HENT: Negative for congestion.    Eyes: Negative for pain.   Respiratory: Negative for cough and shortness of breath.    Cardiovascular: Negative for chest pain and leg swelling.   Gastrointestinal: Negative for nausea, vomiting, abdominal pain and diarrhea.   Genitourinary: Negative for dysuria and hematuria.   Skin: Negative for rash.   Neurological: Negative for dizziness, focal weakness and headaches.   Endo/Heme/Allergies: Does not bruise/bleed easily.   Psychiatric/Behavioral: Negative for depression.  The patient is not nervous/anxious.      Objective:   /82   Pulse 95   Temp 36.5 °C (97.7 °F) (Temporal)   Resp 16   Ht 1.626 m (5' 4\")   Wt 107 kg (235 lb 9.6 oz)   LMP 09/02/2010 (Within Months)   SpO2 94%   BMI 40.44 kg/m²     Wt Readings from Last 4 Encounters:   07/07/21 107 kg (235 lb 9.6 oz)   02/10/21 105 kg (231 lb)   12/17/20 106 kg (233 lb)   09/02/20 105 kg (232 lb)       A chaperone was offered to the patient during today's exam. Patient declined chaperone.    Physical Exam:  Constitutional: Well-developed and well-nourished. Not diaphoretic. No distress.   Skin: Skin is warm and dry. No rash noted.  Head: Atraumatic without lesions.  Eyes: Conjunctivae and extraocular motions are normal. Pupils are equal, round, and reactive to light. No scleral icterus.   Nose: Nares patent. Septum midline. Turbinates without erythema nor edema. No discharge.   Mouth/Throat: Tongue normal. Oropharynx is clear and moist. Posterior pharynx without erythema or exudates.  Neck: Supple, trachea midline. Normal range of motion. No thyromegaly present. No lymphadenopathy--cervical or supraclavicular.  Cardiovascular: Regular rate and rhythm, S1 and S2 without murmur, rubs, or gallops.    Breast: Breasts examined seated and supine. No skin changes, peau d'orange or nipple retraction. No discharge. No axillary or " supraclavicular adenopathy. No masses or nodularity palpable.  Abdomen: Soft, non tender, and without distention. Active bowel sounds in all four quadrants. No rebound, guarding, masses or HSM.  : Perineum and external genitalia normal without rash. Vagina with normal and physiologic, scant and clear discharge. Cervix without visible lesions or discharge. Normal Bimanual exam without adnexal masses or cervical motion tenderness.  Extremities: No cyanosis, clubbing, erythema, nor edema. Distal pulses intact and symmetric.   Musculoskeletal: All major joints AROM full in all directions without pain.  Neurological: Alert and oriented x 3. Grossly non-focal. Strength and sensation grossly intact.   Psychiatric:  Behavior, mood, and affect are appropriate.      Assessment and Plan:     1. Well woman exam with routine gynecological exam      Health maintenance:     Labs per orders  Immunizations per orders  Patient counseled about skin care, diet, supplements, and exercise.  Discussed  breast self exam, mammography screening, menopause, osteoporosis, Kegel exercises, colorectal cancer screening     Follow-up: Return in about 6 months (around 1/7/2022).

## 2021-07-08 DIAGNOSIS — Z01.419 WELL WOMAN EXAM WITH ROUTINE GYNECOLOGICAL EXAM: ICD-10-CM

## 2021-07-08 LAB
CYTOLOGY REG CYTOL: NORMAL
HPV HR 12 DNA CVX QL NAA+PROBE: NEGATIVE
HPV16 DNA SPEC QL NAA+PROBE: NEGATIVE
HPV18 DNA SPEC QL NAA+PROBE: NEGATIVE
SPECIMEN SOURCE: NORMAL

## 2021-07-22 ENCOUNTER — TELEPHONE (OUTPATIENT)
Dept: MEDICAL GROUP | Facility: CLINIC | Age: 61
End: 2021-07-22

## 2021-07-22 NOTE — TELEPHONE ENCOUNTER
Phone Number Called: my chart message    Call outcome: My chart message    Message: My chart message sent to let pt know pap came back normal

## 2021-08-02 RX ORDER — OXYBUTYNIN CHLORIDE 5 MG/1
TABLET, EXTENDED RELEASE ORAL
Qty: 30 TABLET | Refills: 0 | Status: SHIPPED
Start: 2021-08-02 | End: 2022-01-18

## 2021-09-28 DIAGNOSIS — I10 ESSENTIAL HYPERTENSION: ICD-10-CM

## 2021-09-28 RX ORDER — LISINOPRIL AND HYDROCHLOROTHIAZIDE 25; 20 MG/1; MG/1
TABLET ORAL
Qty: 90 TABLET | Refills: 1 | Status: SHIPPED | OUTPATIENT
Start: 2021-09-28 | End: 2022-06-30 | Stop reason: SDUPTHER

## 2022-01-06 ENCOUNTER — HOSPITAL ENCOUNTER (OUTPATIENT)
Facility: MEDICAL CENTER | Age: 62
End: 2022-01-06
Attending: PHYSICIAN ASSISTANT
Payer: MEDICAID

## 2022-01-06 PROCEDURE — 87086 URINE CULTURE/COLONY COUNT: CPT

## 2022-01-09 LAB
BACTERIA UR CULT: NORMAL
SIGNIFICANT IND 70042: NORMAL
SITE SITE: NORMAL
SOURCE SOURCE: NORMAL

## 2022-01-18 ENCOUNTER — OFFICE VISIT (OUTPATIENT)
Dept: MEDICAL GROUP | Facility: CLINIC | Age: 62
End: 2022-01-18
Payer: MEDICAID

## 2022-01-18 VITALS
BODY MASS INDEX: 38.39 KG/M2 | TEMPERATURE: 97.8 F | HEART RATE: 93 BPM | DIASTOLIC BLOOD PRESSURE: 82 MMHG | OXYGEN SATURATION: 93 % | SYSTOLIC BLOOD PRESSURE: 130 MMHG | RESPIRATION RATE: 16 BRPM | HEIGHT: 65 IN | WEIGHT: 230.4 LBS

## 2022-01-18 DIAGNOSIS — N39.46 MIXED STRESS AND URGE URINARY INCONTINENCE: ICD-10-CM

## 2022-01-18 DIAGNOSIS — Z12.12 SCREENING FOR COLORECTAL CANCER: ICD-10-CM

## 2022-01-18 DIAGNOSIS — Z23 NEED FOR VACCINATION: ICD-10-CM

## 2022-01-18 DIAGNOSIS — E66.09 CLASS 2 OBESITY DUE TO EXCESS CALORIES WITHOUT SERIOUS COMORBIDITY WITH BODY MASS INDEX (BMI) OF 39.0 TO 39.9 IN ADULT: ICD-10-CM

## 2022-01-18 DIAGNOSIS — Z12.11 SCREENING FOR COLORECTAL CANCER: ICD-10-CM

## 2022-01-18 DIAGNOSIS — Z12.31 ENCOUNTER FOR SCREENING MAMMOGRAM FOR BREAST CANCER: ICD-10-CM

## 2022-01-18 PROCEDURE — 90471 IMMUNIZATION ADMIN: CPT | Performed by: PHYSICIAN ASSISTANT

## 2022-01-18 PROCEDURE — 99214 OFFICE O/P EST MOD 30 MIN: CPT | Mod: 25 | Performed by: PHYSICIAN ASSISTANT

## 2022-01-18 PROCEDURE — 90686 IIV4 VACC NO PRSV 0.5 ML IM: CPT | Performed by: PHYSICIAN ASSISTANT

## 2022-01-18 RX ORDER — BEPOTASTINE BESILATE 15 MG/ML
SOLUTION/ DROPS OPHTHALMIC
COMMUNITY
Start: 2022-01-17 | End: 2023-01-11 | Stop reason: CLARIF

## 2022-01-18 ASSESSMENT — FIBROSIS 4 INDEX: FIB4 SCORE: 0.93

## 2022-01-19 ASSESSMENT — VISUAL ACUITY: OU: 1

## 2022-01-19 ASSESSMENT — ENCOUNTER SYMPTOMS
CONSTITUTIONAL NEGATIVE: 1
NEUROLOGICAL NEGATIVE: 1
CARDIOVASCULAR NEGATIVE: 1
GASTROINTESTINAL NEGATIVE: 1
RESPIRATORY NEGATIVE: 1
EYES NEGATIVE: 1
PSYCHIATRIC NEGATIVE: 1
MUSCULOSKELETAL NEGATIVE: 1

## 2022-01-19 NOTE — PROGRESS NOTES
Subjective   Denisse Ramirez is a 61 y.o. female who presents with Results (labs )    1. Mixed stress and urge urinary incontinence  Patient has been followed by urology. They recently discontinued her oxybutynin and started her on mirabegron ER.  She states that she has only been on it for a few days so she has not noticed any difference being on the new medication yet.  She just wanted to be sure that we were aware of her medication change.  She will continue taking her medication as prescribed.  Urology will continue to manage.    2. Need for vaccination  Patient is due for influenza vaccine.  This has been administered in clinic today.   INFLUENZA VACCINE QUAD INJ (PF)    3. Class 2 obesity due to excess calories without serious comorbidity with body mass index (BMI) of 39.0 to 39.9 in adult  Body mass index is 38.94 kg/m². Patient has been diagnosed with obesity and again has been counseled on caloric and carbohydrate restriction along with increasing exercise to 30 minutes 5 or more times a week.   Patient identified as having weight management issue.  Appropriate orders and counseling given.    4. Encounter for screening mammogram for breast cancer  Patient is due for screening mammogram.  Order placed today.  She is strongly encouraged to get this scheduled.   MA-SCREENING MAMMO BILAT W/TOMOSYNTHESIS W/CAD; Future    5. Screening for colorectal cancer  Patient will be due for repeat FIT testing in February.  Order placed today with instructions not to complete until February so that it is covered by the insurance.  We will follow-up with results.   OCCULT BLOOD FECES IMMUNOASSAY (FIT); Future    Past Medical History:  No date: Anxiety  No date: Arthritis  No date: Chronic non-seasonal allergic rhinitis  No date: Deep vein blood clot of right lower extremity (HCC)      Comment:  2003 due to birth control patch.  No date: Depression  2/10/2021: Gastroesophageal reflux disease without  esophagitis  3/30/2017: History of tobacco use disorder  No date: Hypertension  No date: Insomnia  No date: Muscle disorder  9/10/2019: Sensation of pressure in bladder area  4/20/2017: Stress at home  6/27/2017: Vaginal pain  Past Surgical History:  No date: ABDOMINAL EXPLORATION  No date: TONSILLECTOMY  Social History    Tobacco Use      Smoking status: Former Smoker        Packs/day: 1.00        Years: 35.00        Pack years: 35        Types: Cigarettes        Quit date: 4/17/2018        Years since quitting: 3.7      Smokeless tobacco: Never Used    Vaping Use      Vaping Use: Former        Quit date: 9/2/2016        Substances: CBD    Alcohol use: Yes      Comment: 1 glass wine/mo    Drug use: Yes      Types: Marijuana      Comment: occ    Review of patient's family history indicates:  Problem: Lung Disease      Relation: Mother          Age of Onset: (Not Specified)          Comment: emphysema  Problem: Cancer      Relation: Father          Age of Onset: (Not Specified)  Problem: Heart Disease      Relation: Father          Age of Onset: (Not Specified)  Problem: Lung Disease      Relation: Brother          Age of Onset: (Not Specified)  Problem: Lung Disease      Relation: Brother          Age of Onset: (Not Specified)      Current Outpatient Medications: •  Mirabegron ER 50 MG TABLET SR 24 HR, Take  by mouth., Disp: , Rfl: •  BEPREVE 1.5 % Solution, , Disp: , Rfl: •  VENTOLIN  (90 Base) MCG/ACT Aero Soln inhalation aerosol, INHALE 2 PUFFS BY MOUTH EVERY 6 HOURS AS NEEDED FOR SHORTNESS OF BREATH, Disp: 18 g, Rfl: 1•  hydrocortisone 1 % Cream, APPLY TOPICALLY TO AFFECTED AREAS TWO TIMES A DAY FOR 10 DAYS, Disp: 28 g, Rfl: 3•  docusate sodium (COLACE) 100 MG Cap, , Disp: , Rfl: •  OLANZapine (ZYPREXA) 2.5 MG Tab, Take 1 Tablet by mouth every day., Disp: , Rfl: •  lamotrigine (LAMICTAL) 200 MG tablet, Take 1 Tablet by mouth every day., Disp: , Rfl: •  diclofenac sodium (VOLTAREN) 1 % Gel, , Disp: , Rfl:  "•  lisinopril-hydrochlorothiazide (PRINZIDE) 20-25 MG per tablet, TAKE ONE TABLET BY MOUTH DAILY  PRINZIDE, Disp: 90 Tablet, Rfl: 1•  cetirizine (ZYRTEC) 10 MG Tab, Take 1 tablet by mouth every day., Disp: 90 tablet, Rfl: 2•  amLODIPine (NORVASC) 2.5 MG Tab, Take 1 tablet by mouth every day., Disp: 90 tablet, Rfl: 2•  omeprazole (PRILOSEC) 20 MG delayed-release capsule, Take 1 capsule by mouth every day., Disp: 90 capsule, Rfl: 3•  venlafaxine XR (EFFEXOR XR) 37.5 MG CAPSULE SR 24 HR, Take 1 Cap by mouth every day., Disp: , Rfl: •  hydrOXYzine HCl (ATARAX) 50 MG Tab, Take 1 Tab by mouth 3 times a day., Disp: , Rfl: •  doxepin (SINEQUAN) 25 MG Cap, Take 2 Caps by mouth every bedtime., Disp: , Rfl: •  cyclobenzaprine (FLEXERIL) 10 MG Tab, , Disp: , Rfl: 2•  busPIRone (BUSPAR) 10 MG Tab tablet, , Disp: , Rfl: 0•  meloxicam (MOBIC) 15 MG tablet, TAKE ONE TABLET BY MOUTH DAILY, Disp: 30 Tab, Rfl: 2•  hydrocodone/acetaminophen (NORCO)  MG Tab, Take 1-2 Tabs by mouth every 6 hours as needed., Disp: , Rfl:     Patient was instructed on the use of medications, either prescriptions or OTC and informed on when the appropriate follow up time period should be. In addition, patient was also instructed that should any acute worsening occur that they should notify this clinic asap or call 911.      Review of Systems   Constitutional: Negative.    HENT: Negative.    Eyes: Negative.    Respiratory: Negative.    Cardiovascular: Negative.    Gastrointestinal: Negative.    Genitourinary: Negative.         Chronic urinary incontinence   Musculoskeletal: Negative.    Skin: Negative.    Neurological: Negative.    Endo/Heme/Allergies: Negative.    Psychiatric/Behavioral: Negative.      Objective     /82 (BP Location: Left arm, Patient Position: Sitting, BP Cuff Size: Large adult)   Pulse 93   Temp 36.6 °C (97.8 °F) (Temporal)   Resp 16   Ht 1.638 m (5' 4.5\") Comment: stated by pt  Wt 105 kg (230 lb 6.4 oz) Comment: with " shoes on  LMP 09/02/2010 (Within Months)   SpO2 93%   BMI 38.94 kg/m²      Physical Exam  Vitals and nursing note reviewed.   Constitutional:       Appearance: Normal appearance. She is well-developed and well-groomed.   HENT:      Head: Normocephalic and atraumatic.      Nose: Nose normal.      Mouth/Throat:      Lips: Pink. No lesions.      Mouth: Mucous membranes are moist.   Eyes:      General: Lids are normal. Vision grossly intact. Gaze aligned appropriately.      Extraocular Movements: Extraocular movements intact.      Conjunctiva/sclera: Conjunctivae normal.      Pupils: Pupils are equal, round, and reactive to light.   Neck:      Thyroid: No thyromegaly.      Vascular: No carotid bruit or JVD.      Trachea: Trachea and phonation normal.   Cardiovascular:      Rate and Rhythm: Normal rate and regular rhythm.      Heart sounds: Normal heart sounds. No murmur heard.  No friction rub. No gallop.    Pulmonary:      Effort: Pulmonary effort is normal.      Breath sounds: Normal breath sounds. No wheezing, rhonchi or rales.   Musculoskeletal:         General: Normal range of motion.      Cervical back: Normal range of motion and neck supple.      Right lower leg: No edema.      Left lower leg: No edema.   Lymphadenopathy:      Cervical: No cervical adenopathy.   Skin:     General: Skin is warm and dry.      Capillary Refill: Capillary refill takes less than 2 seconds.      Findings: No lesion or rash.   Neurological:      Mental Status: She is alert and oriented to person, place, and time.      Cranial Nerves: Cranial nerves are intact.   Psychiatric:         Attention and Perception: Attention and perception normal.         Mood and Affect: Mood and affect normal.         Speech: Speech normal.         Behavior: Behavior normal. Behavior is cooperative.         Thought Content: Thought content normal.         Judgment: Judgment normal.       Assessment & Plan      1. Mixed stress and urge urinary  incontinence    2. Need for vaccination  - INFLUENZA VACCINE QUAD INJ (PF)    3. Class 2 obesity due to excess calories without serious comorbidity with body mass index (BMI) of 39.0 to 39.9 in adult  - Patient identified as having weight management issue.  Appropriate orders and counseling given.    4. Encounter for screening mammogram for breast cancer  - MA-SCREENING MAMMO BILAT W/TOMOSYNTHESIS W/CAD; Future    5. Screening for colorectal cancer  - OCCULT BLOOD FECES IMMUNOASSAY (FIT); Future

## 2022-03-10 ENCOUNTER — HOSPITAL ENCOUNTER (OUTPATIENT)
Facility: MEDICAL CENTER | Age: 62
End: 2022-03-10
Attending: PHYSICIAN ASSISTANT
Payer: MEDICAID

## 2022-03-10 PROCEDURE — 82274 ASSAY TEST FOR BLOOD FECAL: CPT

## 2022-03-19 DIAGNOSIS — Z12.12 SCREENING FOR COLORECTAL CANCER: ICD-10-CM

## 2022-03-19 DIAGNOSIS — Z12.11 SCREENING FOR COLORECTAL CANCER: ICD-10-CM

## 2022-03-22 LAB — IMM ASSAY OCC BLD FITOB: NEGATIVE

## 2022-06-03 ENCOUNTER — APPOINTMENT (OUTPATIENT)
Dept: MEDICAL GROUP | Facility: CLINIC | Age: 62
End: 2022-06-03
Payer: MEDICAID

## 2022-06-09 ENCOUNTER — OFFICE VISIT (OUTPATIENT)
Dept: MEDICAL GROUP | Facility: CLINIC | Age: 62
End: 2022-06-09
Payer: MEDICAID

## 2022-06-09 VITALS
TEMPERATURE: 97.2 F | HEART RATE: 99 BPM | HEIGHT: 64 IN | RESPIRATION RATE: 20 BRPM | DIASTOLIC BLOOD PRESSURE: 72 MMHG | OXYGEN SATURATION: 94 % | BODY MASS INDEX: 39.4 KG/M2 | WEIGHT: 230.8 LBS | SYSTOLIC BLOOD PRESSURE: 110 MMHG

## 2022-06-09 DIAGNOSIS — R79.89 ABNORMAL CBC: ICD-10-CM

## 2022-06-09 DIAGNOSIS — R73.03 PREDIABETES: ICD-10-CM

## 2022-06-09 DIAGNOSIS — R94.4 DECREASED GFR: ICD-10-CM

## 2022-06-09 DIAGNOSIS — E55.9 VITAMIN D DEFICIENCY: ICD-10-CM

## 2022-06-09 DIAGNOSIS — E78.5 MILD HYPERLIPIDEMIA: ICD-10-CM

## 2022-06-09 DIAGNOSIS — I10 ESSENTIAL HYPERTENSION: ICD-10-CM

## 2022-06-09 DIAGNOSIS — J01.10 ACUTE NON-RECURRENT FRONTAL SINUSITIS: ICD-10-CM

## 2022-06-09 PROCEDURE — 99214 OFFICE O/P EST MOD 30 MIN: CPT | Performed by: PHYSICIAN ASSISTANT

## 2022-06-09 RX ORDER — OLANZAPINE 5 MG/1
5 TABLET ORAL NIGHTLY
COMMUNITY
Start: 2022-06-03

## 2022-06-09 RX ORDER — AMOXICILLIN AND CLAVULANATE POTASSIUM 875; 125 MG/1; MG/1
1 TABLET, FILM COATED ORAL 2 TIMES DAILY
Qty: 14 TABLET | Refills: 0 | Status: SHIPPED | OUTPATIENT
Start: 2022-06-09 | End: 2022-08-11

## 2022-06-09 RX ORDER — CYCLOBENZAPRINE HCL 5 MG
TABLET ORAL
COMMUNITY
Start: 2022-05-16 | End: 2022-06-09

## 2022-06-09 ASSESSMENT — FIBROSIS 4 INDEX: FIB4 SCORE: 0.93

## 2022-06-29 RX ORDER — CYCLOBENZAPRINE HCL 5 MG
TABLET ORAL
COMMUNITY
Start: 2022-06-10 | End: 2022-12-06

## 2022-06-30 DIAGNOSIS — F42.4 PICKING OWN SKIN: ICD-10-CM

## 2022-06-30 PROBLEM — R89.2 ABNORMAL DRUG SCREEN: Status: RESOLVED | Noted: 2017-04-20 | Resolved: 2022-06-30

## 2022-06-30 PROBLEM — Z23 IMMUNIZATION DUE: Status: RESOLVED | Noted: 2021-02-10 | Resolved: 2022-06-30

## 2022-06-30 PROBLEM — Z00.00 HEALTHCARE MAINTENANCE: Status: RESOLVED | Noted: 2020-09-02 | Resolved: 2022-06-30

## 2022-06-30 PROBLEM — R60.0 BILATERAL LOWER EXTREMITY EDEMA: Status: RESOLVED | Noted: 2018-04-10 | Resolved: 2022-06-30

## 2022-06-30 PROBLEM — N95.1 HOT FLASHES DUE TO MENOPAUSE: Status: RESOLVED | Noted: 2018-04-10 | Resolved: 2022-06-30

## 2022-06-30 PROBLEM — R94.31 ABNORMAL EKG: Status: RESOLVED | Noted: 2018-04-10 | Resolved: 2022-06-30

## 2022-06-30 PROBLEM — H91.93 DECREASED HEARING OF BOTH EARS: Status: RESOLVED | Noted: 2018-01-05 | Resolved: 2022-06-30

## 2022-06-30 RX ORDER — AMLODIPINE BESYLATE 2.5 MG/1
2.5 TABLET ORAL DAILY
Qty: 30 TABLET | Refills: 0 | Status: SHIPPED | OUTPATIENT
Start: 2022-06-30 | End: 2022-08-23

## 2022-06-30 RX ORDER — BENZOCAINE/MENTHOL 6 MG-10 MG
LOZENGE MUCOUS MEMBRANE
Qty: 28.4 G | Refills: 0 | Status: SHIPPED | OUTPATIENT
Start: 2022-06-30 | End: 2023-01-11

## 2022-06-30 RX ORDER — LISINOPRIL AND HYDROCHLOROTHIAZIDE 25; 20 MG/1; MG/1
TABLET ORAL
Qty: 30 TABLET | Refills: 0 | Status: SHIPPED | OUTPATIENT
Start: 2022-06-30 | End: 2022-11-11 | Stop reason: SDUPTHER

## 2022-06-30 NOTE — ASSESSMENT & PLAN NOTE
Patient presenting with sinus pressure, pain, green mucus and nasal congestion. Tender to percussion over bilateral maxillary sinuses. Nasal turbinates have erythema without swelling, Green mucus present. Will start Augmentin for 7 days. Will follow up in two weeks with labs but should report to clinic sooner if symptoms do not resolve or if they worsen.

## 2022-06-30 NOTE — TELEPHONE ENCOUNTER
Assessment/Plan:     Problem List Items Addressed This Visit        [facility] Cardiac Problems    Mild intermittent asthma    Relevant Medications    albuterol (PROAIR HFA;PROVENTIL HFA;VENTOLIN HFA) 90 mcg/actuation inhaler       Other    Family history of colon cancer      Other Visit Diagnoses     RUQ abdominal pain    -  Primary    Relevant Orders    Comprehensive Metabolic Panel    HM2(CBC w/o Differential) (Completed)    Lipase        Lela is a 61-year-old female presenting today with concerns about persistent localized abdominal pain.  In the absence of a good explanation, considering her age and persistent nature of the pain, I did set the patient up for a CT of her abdomen.  This came back completely unremarkable and I was able to reassure the patient and she was very relieved.  I did check some labs and her CBC came back showing a little bit of an elevated hemoglobin.  I suspect this is due to hemoconcentration and perhaps not drinking enough fluids.  I did discuss this with the patient I recommended that we recheck a hemogram at her next visit.  Still awaiting lipase and comprehensive metabolic panel.  I asked the patient to let me know if the abdominal pain is changing in any way.    Subjective:       61 y.o. female presents for evaluation of right upper quadrant abdominal pain.  The patient reports that this started a couple months ago but has become persistent albeit quite mild.  The pain continues in the exact same place which is right under her ribs on the right side.  She has not noticed anything that seems to make it better or worse specifically eating does not change the discomfort.  She has not had any unusual fevers, chills or fatigue.  However, the persistence and localization of the pain has made her concerned.  She is due for a screening colonoscopy due to a family history of colon cancer, however, she did have a colonoscopy 5 years ago which was completely normal.  She has not noticed any  Was the patient seen in the last year in this department? Yes    Does patient have an active prescription for medications requested? Yes    Received Request Via: Pharmacy    Hospital Outpatient Visit on 03/10/2022   Component Date Value   • Occult Blood, IA 03/10/2022 Negative    Hospital Outpatient Visit on 01/06/2022   Component Date Value   • Significant Indicator 01/06/2022 NEG    • Source 01/06/2022 UR    • Site 01/06/2022 -    • Culture Result 01/06/2022 No growth at 48 hours.    Hospital Outpatient Visit on 07/07/2021   Component Date Value   • Cytology Reg 07/07/2021 See Path Report    • Source 07/07/2021 Endo/Cervical    • HPV Genotype 16 07/07/2021 Negative    • HPV Genotype 18 07/07/2021 Negative    • HPV Other High Risk Keren* 07/07/2021 Negative    ]   change in her bowel movements.      Reviewed: The following portions of the patient's history were reviewed and updated as appropriate: allergies, current medications, past family history, past medical history, past social history, past surgical history and problem list.    Review of Systems  Pertinent items are noted in HPI.        Objective:     /74 (Patient Site: Left Arm, Patient Position: Sitting, Cuff Size: Adult Regular)   Pulse 70   Wt 125 lb (56.7 kg)   BMI 24.01 kg/m    General appearance: alert, appears stated age and cooperative  Lungs: clear to auscultation bilaterally  Heart: regular rate and rhythm, S1, S2 normal, no murmur, click, rub or gallop  Abdomen: soft, non-distended, mild tenderness localized to the RUQ without masses      This note has been dictated using voice recognition software. Any grammatical or context distortions are unintentional and inherent to the software

## 2022-06-30 NOTE — PROGRESS NOTES
Chief Complaint   Patient presents with   • Medication Refill     All medications that [facility] manages       HISTORY OF PRESENT ILLNESS: Patient is a 61 y.o. female established patient who presents today to discuss the following issues:    Essential hypertension  Currently treated for hypertension, taking medications as prescribed.  Denies chest pain or shortness of breath. Blood pressure in the office is 110/72. She does need refills.  She needs to have labs completed and follow up in two weeks to review results. Controlled    Acute non-recurrent frontal sinusitis  Patient presenting with sinus pressure, pain, green mucus and nasal congestion. Tender to percussion over bilateral maxillary sinuses. Nasal turbinates have erythema without swelling, Green mucus present. Will start Augmentin for 7 days. Will follow up in two weeks with labs but should report to clinic sooner if symptoms do not resolve or if they worsen.       Patient Active Problem List    Diagnosis Date Noted   • Acute non-recurrent frontal sinusitis 06/09/2022   • Well woman exam with routine gynecological exam 07/07/2021   • Gastroesophageal reflux disease without esophagitis 02/10/2021   • Environmental and seasonal allergies 09/02/2020   • Mixed stress and urge urinary incontinence 09/10/2019   • Class 2 obesity due to excess calories with body mass index (BMI) of 39.0 to 39.9 in adult 08/13/2019   • Picking own skin 08/13/2019   • Essential hypertension 04/10/2018   • History of methamphetamine use 04/10/2018   • Uncomplicated opioid dependence (HCC) 04/10/2018   • Pain management contract agreement 01/05/2018   • Prediabetes 04/20/2017   • Mild hyperlipidemia 04/20/2017   • Pain of both hip joints 02/01/2017   • Chronic right shoulder pain 02/01/2017   • Bipolar 2 disorder, major depressive episode (HCC) 05/03/2016   • Insomnia 05/03/2016   • Bilateral low back pain with right-sided sciatica 03/04/2016   • Anxiety 03/04/2016        Allergies:Tetanus toxoids    Current Outpatient Medications   Medication Sig Dispense Refill   • lisinopril-hydrochlorothiazide (PRINZIDE) 20-25 MG per tablet TAKE ONE TABLET BY MOUTH DAILY  PRINZIDE 30 Tablet 0   • amLODIPine (NORVASC) 2.5 MG Tab Take 1 Tablet by mouth every day. 30 Tablet 0   • cetirizine (ZYRTEC) 10 MG Tab TAKE ONE TABLET BY MOUTH EVERY DAY 90 Tablet 1   • OLANZapine (ZYPREXA) 5 MG Tab Take 5 mg by mouth every evening.     • amoxicillin-clavulanate (AUGMENTIN) 875-125 MG Tab Take 1 Tablet by mouth 2 times a day. 14 Tablet 0   • hydrocortisone 1 % Cream APPLY TOPICALLY TO AFFECTED AREAS TWO TIMES A DAY FOR 10 DAYS 28.4 g 0   • omeprazole (PRILOSEC) 20 MG delayed-release capsule TAKE ONE CAPSULE BY MOUTH EVERY DAY 90 Capsule 3   • Mirabegron ER 50 MG TABLET SR 24 HR Take  by mouth.     • BEPREVE 1.5 % Solution      • VENTOLIN  (90 Base) MCG/ACT Aero Soln inhalation aerosol INHALE 2 PUFFS BY MOUTH EVERY 6 HOURS AS NEEDED FOR SHORTNESS OF BREATH 18 g 1   • docusate sodium (COLACE) 100 MG Cap      • diclofenac sodium (VOLTAREN) 1 % Gel      • venlafaxine XR (EFFEXOR XR) 37.5 MG CAPSULE SR 24 HR Take 1 Cap by mouth every day.     • hydrOXYzine HCl (ATARAX) 50 MG Tab Take 1 Tab by mouth 3 times a day.     • doxepin (SINEQUAN) 25 MG Cap Take 2 Caps by mouth every bedtime.     • busPIRone (BUSPAR) 10 MG Tab tablet   0   • meloxicam (MOBIC) 15 MG tablet TAKE ONE TABLET BY MOUTH DAILY 30 Tab 2   • hydrocodone/acetaminophen (NORCO)  MG Tab Take 1-2 Tabs by mouth every 6 hours as needed.     • cyclobenzaprine (FLEXERIL) 5 mg tablet      • lamotrigine (LAMICTAL) 200 MG tablet Take 1 Tablet by mouth every day.       No current facility-administered medications for this visit.       Social History     Tobacco Use   • Smoking status: Former Smoker     Packs/day: 1.00     Years: 35.00     Pack years: 35.00     Types: Cigarettes     Quit date: 2018     Years since quittin.2   • Smokeless  tobacco: Never Used   Vaping Use   • Vaping Use: Former   • Quit date: 9/2/2016   • Substances: CBD   Substance Use Topics   • Alcohol use: Yes     Comment: 1 glass wine/mo   • Drug use: Yes     Types: Marijuana     Comment: occ       Family Status   Relation Name Status   • Mo  Alive   • Fa  Alive   • Sis  Alive   • Bro  Alive   • Bro  Alive   • Bro  Alive     Family History   Problem Relation Age of Onset   • Lung Disease Mother         emphysema   • Cancer Father    • Heart Disease Father    • Lung Disease Brother    • Lung Disease Brother        Review of Systems:   Constitutional: Negative for fever, chills, weight loss and malaise/fatigue.   HENT: Positive for sinus pain, nasal congestion. Negative for ear pain, nosebleeds, sore throat and neck pain.    Eyes: Negative for blurred vision.   Respiratory: Negative for cough, sputum production, shortness of breath and wheezing.    Cardiovascular: Negative for chest pain, palpitations, orthopnea and leg swelling.   Gastrointestinal: Negative for heartburn, nausea, vomiting and abdominal pain.   Genitourinary: Negative for dysuria, urgency and frequency.   Musculoskeletal: Negative for myalgias, back pain and joint pain.   Skin: Negative for rash and itching.   Neurological: Negative for dizziness, tingling, tremors, sensory change, focal weakness and headaches.   Endo/Heme/Allergies: Does not bruise/bleed easily.   Psychiatric/Behavioral: Negative for depression, suicidal ideas and memory loss.  The patient is not nervous/anxious and does not have insomnia.    All other systems reviewed and are negative except as in HPI.    Wt Readings from Last 3 Encounters:   06/09/22 105 kg (230 lb 12.8 oz)   01/18/22 105 kg (230 lb 6.4 oz)   07/07/21 107 kg (235 lb 9.6 oz)   ]  Patient's last menstrual period was 09/02/2010 (within months).    Exam:  /72 (BP Location: Left arm, Patient Position: Sitting, BP Cuff Size: Large adult)   Pulse 99   Temp 36.2 °C (97.2 °F)  "(Temporal)   Resp 20   Ht 1.626 m (5' 4\")   Wt 105 kg (230 lb 12.8 oz)   SpO2 94%  Body mass index is 39.62 kg/m².   General:  Well nourished, obese, well developed female. No apparent distress. Not ill appearing.  Eyes: EOM intact, PERRL, conjunctiva non-injected, sclera non-icteric.  Ears: Rula pinnae, external auditory canals, TM pearly gray with normal light reflex bilaterally.  Nose: Maxillary sinuses tender to percussion bilaterally. No frontal or ethmoid sinus pain. Nasal passages with erythema and green mucus.  Neck: Supple with no cervical lymphadenopathy, JVD, palpable thyroid nodules or carotid bruits.  Pulmonary: Clear to ausculation bilaterally. Normal effort. No rales, ronchi, or wheezing.  Cardiovascular: Regular rate and rhythm without murmur, rub or gallop.   Extremities: Full range of motion. Warm and well perfused with no edema.  Skin: Intact with no obvious rashes or lesions.  Neuro: Cranial nerves I-XII grossly intact.  Psych: Alert and oriented x 3.  Appropriately dressed. Mood and affect appropriate.    Assessment/Plan:  1. Mild hyperlipidemia  Lipid Profile   2. Prediabetes  Comp Metabolic Panel   3. Decreased GFR  Comp Metabolic Panel    ESTIMATED GFR   4. Vitamin D deficiency  VITAMIN D,25 HYDROXY   5. Abnormal CBC  CBC WITH DIFFERENTIAL   6. Acute non-recurrent frontal sinusitis  amoxicillin-clavulanate (AUGMENTIN) 875-125 MG Tab   7. Essential hypertension  lisinopril-hydrochlorothiazide (PRINZIDE) 20-25 MG per tablet    amLODIPine (NORVASC) 2.5 MG Tab       Reviewed risks and benefits of treatment plan. Patient verbally agrees to plan of care.     Return in about 2 weeks (around 6/23/2022) for f/u labs.    Please note that this dictation was created using voice recognition software. I have made every reasonable attempt to correct obvious errors, but I expect that there are errors of grammar and possibly content that I did not discover before finalizing the note.  "

## 2022-06-30 NOTE — ASSESSMENT & PLAN NOTE
Currently treated for hypertension, taking medications as prescribed.  Denies chest pain or shortness of breath. Blood pressure in the office is 110/72. She does need refills.  She needs to have labs completed and follow up in two weeks to review results. Controlled

## 2022-07-19 ENCOUNTER — NON-PROVIDER VISIT (OUTPATIENT)
Dept: MEDICAL GROUP | Facility: CLINIC | Age: 62
End: 2022-07-19
Payer: MEDICAID

## 2022-07-19 ENCOUNTER — HOSPITAL ENCOUNTER (OUTPATIENT)
Facility: MEDICAL CENTER | Age: 62
End: 2022-07-19
Attending: PHYSICIAN ASSISTANT
Payer: MEDICAID

## 2022-07-19 DIAGNOSIS — R94.4 DECREASED GFR: ICD-10-CM

## 2022-07-19 DIAGNOSIS — R73.03 PREDIABETES: ICD-10-CM

## 2022-07-19 DIAGNOSIS — R79.89 ABNORMAL CBC: ICD-10-CM

## 2022-07-19 DIAGNOSIS — E78.5 MILD HYPERLIPIDEMIA: ICD-10-CM

## 2022-07-19 DIAGNOSIS — E55.9 VITAMIN D DEFICIENCY: ICD-10-CM

## 2022-07-19 LAB
25(OH)D3 SERPL-MCNC: 38 NG/ML (ref 30–100)
ALBUMIN SERPL BCP-MCNC: 4.6 G/DL (ref 3.2–4.9)
ALBUMIN/GLOB SERPL: 1.8 G/DL
ALP SERPL-CCNC: 70 U/L (ref 30–99)
ALT SERPL-CCNC: 18 U/L (ref 2–50)
ANION GAP SERPL CALC-SCNC: 12 MMOL/L (ref 7–16)
AST SERPL-CCNC: 23 U/L (ref 12–45)
BASOPHILS # BLD AUTO: 0.1 % (ref 0–1.8)
BASOPHILS # BLD: 0.01 K/UL (ref 0–0.12)
BILIRUB SERPL-MCNC: 0.4 MG/DL (ref 0.1–1.5)
BUN SERPL-MCNC: 12 MG/DL (ref 8–22)
CALCIUM SERPL-MCNC: 9.7 MG/DL (ref 8.5–10.5)
CHLORIDE SERPL-SCNC: 105 MMOL/L (ref 96–112)
CHOLEST SERPL-MCNC: 209 MG/DL (ref 100–199)
CO2 SERPL-SCNC: 25 MMOL/L (ref 20–33)
CREAT SERPL-MCNC: 1.01 MG/DL (ref 0.5–1.4)
EOSINOPHIL # BLD AUTO: 0.01 K/UL (ref 0–0.51)
EOSINOPHIL NFR BLD: 0.1 % (ref 0–6.9)
ERYTHROCYTE [DISTWIDTH] IN BLOOD BY AUTOMATED COUNT: 44.4 FL (ref 35.9–50)
GFR SERPLBLD CREATININE-BSD FMLA CKD-EPI: 63 ML/MIN/1.73 M 2
GLOBULIN SER CALC-MCNC: 2.6 G/DL (ref 1.9–3.5)
GLUCOSE SERPL-MCNC: 122 MG/DL (ref 65–99)
HCT VFR BLD AUTO: 45.2 % (ref 37–47)
HDLC SERPL-MCNC: 50 MG/DL
HGB BLD-MCNC: 15 G/DL (ref 12–16)
IMM GRANULOCYTES # BLD AUTO: 0.02 K/UL (ref 0–0.11)
IMM GRANULOCYTES NFR BLD AUTO: 0.3 % (ref 0–0.9)
LDLC SERPL CALC-MCNC: 137 MG/DL
LYMPHOCYTES # BLD AUTO: 3.64 K/UL (ref 1–4.8)
LYMPHOCYTES NFR BLD: 46 % (ref 22–41)
MCH RBC QN AUTO: 29 PG (ref 27–33)
MCHC RBC AUTO-ENTMCNC: 33.2 G/DL (ref 33.6–35)
MCV RBC AUTO: 87.4 FL (ref 81.4–97.8)
MONOCYTES # BLD AUTO: 0.47 K/UL (ref 0–0.85)
MONOCYTES NFR BLD AUTO: 5.9 % (ref 0–13.4)
NEUTROPHILS # BLD AUTO: 3.76 K/UL (ref 2–7.15)
NEUTROPHILS NFR BLD: 47.6 % (ref 44–72)
NRBC # BLD AUTO: 0 K/UL
NRBC BLD-RTO: 0 /100 WBC
PLATELET # BLD AUTO: 323 K/UL (ref 164–446)
PMV BLD AUTO: 8.8 FL (ref 9–12.9)
POTASSIUM SERPL-SCNC: 4.1 MMOL/L (ref 3.6–5.5)
PROT SERPL-MCNC: 7.2 G/DL (ref 6–8.2)
RBC # BLD AUTO: 5.17 M/UL (ref 4.2–5.4)
SODIUM SERPL-SCNC: 142 MMOL/L (ref 135–145)
TRIGL SERPL-MCNC: 108 MG/DL (ref 0–149)
WBC # BLD AUTO: 7.9 K/UL (ref 4.8–10.8)

## 2022-07-19 PROCEDURE — 82306 VITAMIN D 25 HYDROXY: CPT

## 2022-07-19 PROCEDURE — 80061 LIPID PANEL: CPT

## 2022-07-19 PROCEDURE — 36415 COLL VENOUS BLD VENIPUNCTURE: CPT | Performed by: PHYSICIAN ASSISTANT

## 2022-07-19 PROCEDURE — 80053 COMPREHEN METABOLIC PANEL: CPT

## 2022-07-19 PROCEDURE — 99000 SPECIMEN HANDLING OFFICE-LAB: CPT | Performed by: PHYSICIAN ASSISTANT

## 2022-07-19 PROCEDURE — 85025 COMPLETE CBC W/AUTO DIFF WBC: CPT

## 2022-08-11 ENCOUNTER — OFFICE VISIT (OUTPATIENT)
Dept: MEDICAL GROUP | Facility: CLINIC | Age: 62
End: 2022-08-11
Payer: MEDICAID

## 2022-08-11 VITALS
WEIGHT: 218.3 LBS | OXYGEN SATURATION: 93 % | TEMPERATURE: 96.9 F | HEART RATE: 93 BPM | SYSTOLIC BLOOD PRESSURE: 120 MMHG | BODY MASS INDEX: 37.27 KG/M2 | RESPIRATION RATE: 20 BRPM | HEIGHT: 64 IN | DIASTOLIC BLOOD PRESSURE: 74 MMHG

## 2022-08-11 DIAGNOSIS — R73.03 PREDIABETES: ICD-10-CM

## 2022-08-11 DIAGNOSIS — Z99.89 AMBULATES WITH CANE: ICD-10-CM

## 2022-08-11 DIAGNOSIS — G89.29 CHRONIC BILATERAL LOW BACK PAIN WITH RIGHT-SIDED SCIATICA: ICD-10-CM

## 2022-08-11 DIAGNOSIS — M25.552 PAIN OF BOTH HIP JOINTS: ICD-10-CM

## 2022-08-11 DIAGNOSIS — M25.551 PAIN OF BOTH HIP JOINTS: ICD-10-CM

## 2022-08-11 DIAGNOSIS — E78.5 MILD HYPERLIPIDEMIA: ICD-10-CM

## 2022-08-11 DIAGNOSIS — M54.41 CHRONIC BILATERAL LOW BACK PAIN WITH RIGHT-SIDED SCIATICA: ICD-10-CM

## 2022-08-11 LAB
HBA1C MFR BLD: 5.7 % (ref 0–5.6)
INT CON NEG: ABNORMAL
INT CON POS: ABNORMAL

## 2022-08-11 PROCEDURE — 99214 OFFICE O/P EST MOD 30 MIN: CPT | Performed by: PHYSICIAN ASSISTANT

## 2022-08-11 PROCEDURE — 83036 HEMOGLOBIN GLYCOSYLATED A1C: CPT | Performed by: PHYSICIAN ASSISTANT

## 2022-08-11 ASSESSMENT — FIBROSIS 4 INDEX: FIB4 SCORE: 1.02

## 2022-09-05 PROBLEM — Z99.89 AMBULATES WITH CANE: Status: ACTIVE | Noted: 2022-09-05

## 2022-09-05 NOTE — ASSESSMENT & PLAN NOTE
Chronic condition. Current lipid panel is total chol 209, Tri 108, HDL 50, .   I recommend decreasing intake of saturated fats which are found in meats that come from a cow or pig. Saturated fats are also found in creams, cheeses, butter, mayonnaise, and fried foods. I recommend increasing intake of vegetables, fruits, fish, and healthy oils like olive oil. Recommend increased physical activity as tolerated. This exercise should last at least 30 minutes and occur 5 or more days per week. Will recheck labs in one year.

## 2022-09-05 NOTE — ASSESSMENT & PLAN NOTE
Chronic condition. Current A1c is 5.7%. This has increased slightly from 5.5% a year ago. She states that she will start getting back on a more healthy diet as she has admittedly not been as strict as she should be. Will recheck labs in one year.

## 2022-09-05 NOTE — ASSESSMENT & PLAN NOTE
She currently ambulates with a cane. She states that she  Is having a harder time walking due to pain in her lower back and bilateral hips. She states that she has almost fallen a few times and is terrified of breaking a hip if she falls. She is requesting to advance to a walker so that she has something to hold on to if she feels she might fall. Order placed today for DME walker.

## 2022-11-11 ENCOUNTER — OFFICE VISIT (OUTPATIENT)
Dept: MEDICAL GROUP | Facility: CLINIC | Age: 62
End: 2022-11-11
Payer: MEDICAID

## 2022-11-11 ENCOUNTER — HOSPITAL ENCOUNTER (OUTPATIENT)
Facility: MEDICAL CENTER | Age: 62
End: 2022-11-11
Attending: PHYSICIAN ASSISTANT
Payer: MEDICAID

## 2022-11-11 VITALS
RESPIRATION RATE: 16 BRPM | HEART RATE: 91 BPM | WEIGHT: 226 LBS | DIASTOLIC BLOOD PRESSURE: 62 MMHG | TEMPERATURE: 97 F | SYSTOLIC BLOOD PRESSURE: 104 MMHG | HEIGHT: 64 IN | OXYGEN SATURATION: 96 % | BODY MASS INDEX: 38.58 KG/M2

## 2022-11-11 DIAGNOSIS — M25.511 CHRONIC RIGHT SHOULDER PAIN: ICD-10-CM

## 2022-11-11 DIAGNOSIS — F11.20 UNCOMPLICATED OPIOID DEPENDENCE (HCC): ICD-10-CM

## 2022-11-11 DIAGNOSIS — M25.552 PAIN OF BOTH HIP JOINTS: ICD-10-CM

## 2022-11-11 DIAGNOSIS — J45.20 ALLERGY-INDUCED ASTHMA, MILD INTERMITTENT, UNCOMPLICATED: ICD-10-CM

## 2022-11-11 DIAGNOSIS — M25.551 PAIN OF BOTH HIP JOINTS: ICD-10-CM

## 2022-11-11 DIAGNOSIS — Z79.891 CHRONIC USE OF OPIATE DRUG FOR THERAPEUTIC PURPOSE: ICD-10-CM

## 2022-11-11 DIAGNOSIS — M54.41 CHRONIC BILATERAL LOW BACK PAIN WITH RIGHT-SIDED SCIATICA: ICD-10-CM

## 2022-11-11 DIAGNOSIS — K21.9 GASTROESOPHAGEAL REFLUX DISEASE WITHOUT ESOPHAGITIS: ICD-10-CM

## 2022-11-11 DIAGNOSIS — G89.29 CHRONIC BILATERAL LOW BACK PAIN WITH RIGHT-SIDED SCIATICA: ICD-10-CM

## 2022-11-11 DIAGNOSIS — I10 ESSENTIAL HYPERTENSION: ICD-10-CM

## 2022-11-11 DIAGNOSIS — G89.29 CHRONIC RIGHT SHOULDER PAIN: ICD-10-CM

## 2022-11-11 DIAGNOSIS — Z23 NEED FOR VACCINATION: ICD-10-CM

## 2022-11-11 PROBLEM — J01.10 ACUTE NON-RECURRENT FRONTAL SINUSITIS: Status: RESOLVED | Noted: 2022-06-09 | Resolved: 2022-11-11

## 2022-11-11 PROCEDURE — 80307 DRUG TEST PRSMV CHEM ANLYZR: CPT

## 2022-11-11 PROCEDURE — 90686 IIV4 VACC NO PRSV 0.5 ML IM: CPT | Performed by: PHYSICIAN ASSISTANT

## 2022-11-11 PROCEDURE — 90471 IMMUNIZATION ADMIN: CPT | Performed by: PHYSICIAN ASSISTANT

## 2022-11-11 PROCEDURE — G0480 DRUG TEST DEF 1-7 CLASSES: HCPCS

## 2022-11-11 PROCEDURE — 99214 OFFICE O/P EST MOD 30 MIN: CPT | Mod: 25 | Performed by: PHYSICIAN ASSISTANT

## 2022-11-11 RX ORDER — DOXEPIN HYDROCHLORIDE 25 MG/1
CAPSULE ORAL
COMMUNITY
Start: 2022-11-07

## 2022-11-11 RX ORDER — LISINOPRIL AND HYDROCHLOROTHIAZIDE 25; 20 MG/1; MG/1
1 TABLET ORAL DAILY
Qty: 90 TABLET | Refills: 3 | Status: SHIPPED | OUTPATIENT
Start: 2022-11-11

## 2022-11-11 RX ORDER — HYDROCODONE BITARTRATE AND ACETAMINOPHEN 10; 325 MG/1; MG/1
1 TABLET ORAL EVERY 8 HOURS PRN
Qty: 90 TABLET | Refills: 0 | Status: SHIPPED | OUTPATIENT
Start: 2022-11-11 | End: 2022-12-06 | Stop reason: SDUPTHER

## 2022-11-11 RX ORDER — ALBUTEROL SULFATE 90 UG/1
2 AEROSOL, METERED RESPIRATORY (INHALATION) EVERY 6 HOURS PRN
Qty: 18 G | Refills: 1 | Status: SHIPPED | OUTPATIENT
Start: 2022-11-11

## 2022-11-11 RX ORDER — OMEPRAZOLE 20 MG/1
20 CAPSULE, DELAYED RELEASE ORAL DAILY
Qty: 90 CAPSULE | Refills: 3 | Status: SHIPPED | OUTPATIENT
Start: 2022-11-11

## 2022-11-11 RX ORDER — AMLODIPINE BESYLATE 2.5 MG/1
2.5 TABLET ORAL DAILY
Qty: 90 TABLET | Refills: 3 | Status: SHIPPED | OUTPATIENT
Start: 2022-11-11

## 2022-11-11 RX ORDER — CARIPRAZINE 3 MG/1
CAPSULE, GELATIN COATED ORAL
COMMUNITY
Start: 2022-11-07 | End: 2023-01-11 | Stop reason: SINTOL

## 2022-11-11 RX ORDER — OMEPRAZOLE 20 MG/1
CAPSULE, DELAYED RELEASE ORAL
COMMUNITY
Start: 2022-11-10 | End: 2022-11-11 | Stop reason: SDUPTHER

## 2022-11-11 RX ORDER — CARIPRAZINE 1.5 MG-3MG
KIT ORAL
COMMUNITY
Start: 2022-09-06 | End: 2023-01-11 | Stop reason: SINTOL

## 2022-11-11 ASSESSMENT — FIBROSIS 4 INDEX: FIB4 SCORE: 1.04

## 2022-11-11 NOTE — PROGRESS NOTES
Chief Complaint   Patient presents with    Medication Refill       HISTORY OF PRESENT ILLNESS: Patient is a 62 y.o. female established patient who presents today to discuss the following issues:    Assessment/Plan  Essential hypertension  Currently treated for hypertension, taking medications as prescribed.  Denies chest pain or shortness of breath. Blood pressure in the office is 104/62. She does need refills.   Controlled    Gastroesophageal reflux disease without esophagitis  Patient is currently being treated for gerd, taking meds with no new symptoms or side effects, is trying to modify diet with decreased acidic foods, caffeine, and alcohol. Needs refill of omeprazole.  Controlled    Bilateral low back pain with right-sided sciatica  Chronic condition. Previously being managed by pain management. Patient states that she was going in to have injections in her spine for the pain. She is a very anxious person and her  always accompanies her to these appointments so he can hold her hand and keep her calm. The provider doing the injections would not allow her  in the room with her. This upset her and her . He was told he could wait in the room across the niño but she would not be able to see him because the door would be closed. She got upset and her  got mad and started saying derogative things with profanity. They were both asked to leave the clinic and she was discharged from the practice. She had been going to the same clinic for many years. She is now without a pain management provider and has been trying to stretch her supply of Norco as far as she could trying to get back in to see them. They refuse to let her return and she is out of medication and going through withdrawal due to improper taper of her medication. Urgent referral to pain management has been placed, UDS ordered and collected today, CS agreement signed, PDMP reviewed and order history matches her timeline. Will  provide one 30 day supply of her NORCO with clear understanding that I would not be refilling them and she needed to establish with a pain management provider ASAP. I have done the UDS in hopes that this will expedite the process. We have had a long discussion about appropriate behavior in clinics, even when they are angry. I explained that even though she may not have meant to be rude, she is not the only patient that they deal with every day and this is a pattern of behavior that they do not tolerate. Better that she have a calm discussion with the provider in the future to ensure they understand her fear and the need to have her  near to keep her calm. That way if exceptions need to be made then it can happen before the procedure time.    Allergy-induced asthma, mild intermittent, uncomplicated  Chronic condition. She takes cetirizine and uses an albuterol inhaler as needed. Needs a refill of her albuterol as her current one is .  Controlled    Need for vaccination  Patient due for annual influenza vaccine. Given in clinic today without adverse reaction.      Reviewed risks and benefits of treatment plan. Patient verbally agrees to plan of care.     Patient Active Problem List    Diagnosis Date Noted    Ambulates with cane 2022    Well woman exam with routine gynecological exam 2021    Gastroesophageal reflux disease without esophagitis 02/10/2021    Need for vaccination 02/10/2021    Allergy-induced asthma, mild intermittent, uncomplicated 2020    Mixed stress and urge urinary incontinence 09/10/2019    Class 2 obesity due to excess calories with body mass index (BMI) of 39.0 to 39.9 in adult 2019    Picking own skin 2019    Essential hypertension 04/10/2018    History of methamphetamine use 04/10/2018    Uncomplicated opioid dependence (HCC) 04/10/2018    Pain management contract agreement 2018    Prediabetes 2017    Mild hyperlipidemia 2017    Pain  of both hip joints 02/01/2017    Chronic right shoulder pain 02/01/2017    Bipolar 2 disorder, major depressive episode (HCC) 05/03/2016    Insomnia 05/03/2016    Bilateral low back pain with right-sided sciatica 03/04/2016    Anxiety 03/04/2016       Allergies:Tetanus toxoids    Current Outpatient Medications   Medication Sig Dispense Refill    doxepin (SINEQUAN) 25 MG Cap       HYDROcodone/acetaminophen (NORCO)  MG Tab Take 1 Tablet by mouth every 8 hours as needed for Moderate Pain or Severe Pain for up to 30 days. 90 Tablet 0    lisinopril-hydrochlorothiazide (PRINZIDE) 20-25 MG per tablet Take 1 Tablet by mouth every day. PRINZIDE 90 Tablet 3    amLODIPine (NORVASC) 2.5 MG Tab Take 1 Tablet by mouth every day. 90 Tablet 3    omeprazole (PRILOSEC) 20 MG delayed-release capsule Take 1 Capsule by mouth every day. 90 Capsule 3    albuterol (VENTOLIN HFA) 108 (90 Base) MCG/ACT Aero Soln inhalation aerosol Inhale 2 Puffs every 6 hours as needed for Shortness of Breath. 18 g 1    hydrocortisone 1 % Cream APPLY TOPICALLY TO AFFECTED AREAS TWO TIMES A DAY FOR 10 DAYS 28.4 g 0    cetirizine (ZYRTEC) 10 MG Tab TAKE ONE TABLET BY MOUTH EVERY DAY 90 Tablet 1    OLANZapine (ZYPREXA) 5 MG Tab Take 1 Tablet by mouth every evening.      BEPREVE 1.5 % Solution       lamotrigine (LAMICTAL) 200 MG tablet Take 1 Tablet by mouth every day.      diclofenac sodium (VOLTAREN) 1 % Gel       hydrOXYzine HCl (ATARAX) 50 MG Tab Take 1 Tablet by mouth 3 times a day.      busPIRone (BUSPAR) 10 MG Tab tablet   0    VRAYLAR 3 MG Cap  (Patient not taking: Reported on 11/11/2022)      VRAYLAR 1.5 & 3 MG Capsule Therapy Pack  (Patient not taking: Reported on 11/11/2022)      cyclobenzaprine (FLEXERIL) 5 mg tablet  (Patient not taking: Reported on 11/11/2022)       No current facility-administered medications for this visit.       Wt Readings from Last 3 Encounters:   11/11/22 103 kg (226 lb)   08/11/22 99 kg (218 lb 4.8 oz)   06/09/22 105  "kg (230 lb 12.8 oz)   ]  Patient's last menstrual period was 09/02/2010 (within months).    Exam:  /62 (BP Location: Right arm, Patient Position: Sitting, BP Cuff Size: Adult long)   Pulse 91   Temp 36.1 °C (97 °F) (Temporal)   Resp 16   Ht 1.618 m (5' 3.7\")   Wt 103 kg (226 lb)   SpO2 96%  Body mass index is 39.16 kg/m².   General:  Well nourished, well developed female. No apparent distress. Not ill appearing.  Eyes: EOM intact, PERRL, conjunctiva non-injected, sclera non-icteric.  Neck: Supple with no cervical lymphadenopathy, JVD, palpable thyroid nodules or carotid bruits.  Pulmonary: Clear to ausculation bilaterally. Normal effort. No rales, ronchi, or wheezing.  Cardiovascular: Regular rate and rhythm without murmur, rub or gallop.   Extremities: Full range of motion. Warm and well perfused with no edema.  Skin: Intact with no obvious rashes or lesions.  Neuro: Cranial nerves I-XII grossly intact.  Psych: Alert and oriented x 3.  Appropriately dressed. Mood and affect appropriate.    Return in about 3 months (around 2/11/2023).  Please note that this dictation was created using voice recognition software. I have made every reasonable attempt to correct obvious errors, but I expect that there are errors of grammar and possibly content that I did not discover before finalizing the note.    "

## 2022-11-13 PROBLEM — J45.20 ALLERGY-INDUCED ASTHMA, MILD INTERMITTENT, UNCOMPLICATED: Status: ACTIVE | Noted: 2020-09-02

## 2022-11-13 NOTE — ASSESSMENT & PLAN NOTE
Currently treated for hypertension, taking medications as prescribed.  Denies chest pain or shortness of breath. Blood pressure in the office is 104/62. She does need refills.   Controlled

## 2022-11-13 NOTE — ASSESSMENT & PLAN NOTE
Chronic condition. She takes cetirizine and uses an albuterol inhaler as needed. Needs a refill of her albuterol as her current one is .  Controlled

## 2022-11-13 NOTE — ASSESSMENT & PLAN NOTE
Patient is currently being treated for gerd, taking meds with no new symptoms or side effects, is trying to modify diet with decreased acidic foods, caffeine, and alcohol. Needs refill of omeprazole.  Controlled

## 2022-11-13 NOTE — ASSESSMENT & PLAN NOTE
Chronic condition. Previously being managed by pain management. Patient states that she was going in to have injections in her spine for the pain. She is a very anxious person and her  always accompanies her to these appointments so he can hold her hand and keep her calm. The provider doing the injections would not allow her  in the room with her. This upset her and her . He was told he could wait in the room across the niño but she would not be able to see him because the door would be closed. She got upset and her  got mad and started saying derogative things with profanity. They were both asked to leave the clinic and she was discharged from the practice. She had been going to the same clinic for many years. She is now without a pain management provider and has been trying to stretch her supply of Norco as far as she could trying to get back in to see them. They refuse to let her return and she is out of medication and going through withdrawal due to improper taper of her medication. Urgent referral to pain management has been placed, UDS ordered and collected today, CS agreement signed, PDMP reviewed and order history matches her timeline. Will provide one 30 day supply of her NORCO with clear understanding that I would not be refilling them and she needed to establish with a pain management provider ASAP. I have done the UDS in hopes that this will expedite the process. We have had a long discussion about appropriate behavior in clinics, even when they are angry. I explained that even though she may not have meant to be rude, she is not the only patient that they deal with every day and this is a pattern of behavior that they do not tolerate. Better that she have a calm discussion with the provider in the future to ensure they understand her fear and the need to have her  near to keep her calm. That way if exceptions need to be made then it can happen before the procedure  time.

## 2022-11-14 LAB
AMPHET CTO UR CFM-MCNC: NEGATIVE NG/ML
BARBITURATES CTO UR CFM-MCNC: NEGATIVE NG/ML
BENZODIAZ CTO UR CFM-MCNC: NEGATIVE NG/ML
BUPRENORPHINE UR-MCNC: NEGATIVE NG/ML
CANNABINOIDS CTO UR CFM-MCNC: POSITIVE NG/ML
CARISOPRODOL UR-MCNC: NEGATIVE NG/ML
COCAINE CTO UR CFM-MCNC: NEGATIVE NG/ML
DRUG SCREEN COMMENT UR-IMP: NORMAL
ETHYL GLUCURONIDE UR QL SCN: NEGATIVE NG/ML
FENTANYL UR-MCNC: NEGATIVE NG/ML
MEPERIDINE CTO UR SCN-MCNC: NEGATIVE NG/ML
METHADONE CTO UR CFM-MCNC: NEGATIVE NG/ML
OPIATES UR QL SCN: NEGATIVE NG/ML
OXYCDOXYM URSCRN 2005102: POSITIVE NG/ML
PCP CTO UR CFM-MCNC: NEGATIVE NG/ML
PROPOXYPH CTO UR CFM-MCNC: NEGATIVE NG/ML
TAPENTADOL UR-MCNC: NEGATIVE NG/ML
TRAMADOL CTO UR SCN-MCNC: NEGATIVE NG/ML
ZOLPIDEM UR-MCNC: NEGATIVE NG/ML

## 2022-11-16 LAB — THC UR CFM-MCNC: <15 NG/ML

## 2022-11-17 LAB
6MAM UR CFM-MCNC: <10 NG/ML
CODEINE UR CFM-MCNC: <20 NG/ML
HYDROCODONE UR CFM-MCNC: <20 NG/ML
HYDROMORPHONE UR CFM-MCNC: <20 NG/ML
MORPHINE UR CFM-MCNC: <20 NG/ML
NORHYDROCODONE UR CFM-MCNC: <20 NG/ML
NOROXYCODONE UR CFM-MCNC: 1017 NG/ML
OPIATES UR NOROXYM Q0836: 28 NG/ML
OXYCODONE UR CFM-MCNC: 314 NG/ML
OXYMORPHONE UR CFM-MCNC: <20 NG/ML

## 2022-12-06 ENCOUNTER — OFFICE VISIT (OUTPATIENT)
Dept: MEDICAL GROUP | Facility: CLINIC | Age: 62
End: 2022-12-06
Payer: MEDICAID

## 2022-12-06 VITALS
SYSTOLIC BLOOD PRESSURE: 102 MMHG | BODY MASS INDEX: 38.24 KG/M2 | HEIGHT: 64 IN | TEMPERATURE: 97.1 F | DIASTOLIC BLOOD PRESSURE: 64 MMHG | OXYGEN SATURATION: 95 % | RESPIRATION RATE: 20 BRPM | WEIGHT: 224 LBS | HEART RATE: 79 BPM

## 2022-12-06 DIAGNOSIS — G89.29 CHRONIC RIGHT SHOULDER PAIN: ICD-10-CM

## 2022-12-06 DIAGNOSIS — M54.41 CHRONIC BILATERAL LOW BACK PAIN WITH RIGHT-SIDED SCIATICA: ICD-10-CM

## 2022-12-06 DIAGNOSIS — M25.552 PAIN OF BOTH HIP JOINTS: ICD-10-CM

## 2022-12-06 DIAGNOSIS — M25.551 PAIN OF BOTH HIP JOINTS: ICD-10-CM

## 2022-12-06 DIAGNOSIS — M25.511 CHRONIC RIGHT SHOULDER PAIN: ICD-10-CM

## 2022-12-06 DIAGNOSIS — G89.29 CHRONIC BILATERAL LOW BACK PAIN WITH RIGHT-SIDED SCIATICA: ICD-10-CM

## 2022-12-06 DIAGNOSIS — Z79.891 CHRONIC USE OF OPIATE DRUG FOR THERAPEUTIC PURPOSE: ICD-10-CM

## 2022-12-06 DIAGNOSIS — Z12.31 ENCOUNTER FOR SCREENING MAMMOGRAM FOR BREAST CANCER: ICD-10-CM

## 2022-12-06 PROBLEM — F11.20 OPIOID DEPENDENCE, UNCOMPLICATED (HCC): Status: RESOLVED | Noted: 2018-04-10 | Resolved: 2022-12-06

## 2022-12-06 PROBLEM — M76.32 ILIOTIBIAL BAND SYNDROME, LEFT LEG: Status: ACTIVE | Noted: 2022-12-05

## 2022-12-06 PROBLEM — G89.4 CHRONIC PAIN SYNDROME: Status: ACTIVE | Noted: 2022-12-05

## 2022-12-06 PROBLEM — Z02.89 PAIN MANAGEMENT CONTRACT AGREEMENT: Status: RESOLVED | Noted: 2018-01-05 | Resolved: 2022-12-06

## 2022-12-06 PROBLEM — M76.31 ILIOTIBIAL BAND SYNDROME, RIGHT LEG: Status: ACTIVE | Noted: 2022-12-05

## 2022-12-06 PROBLEM — Z99.89 AMBULATES WITH CANE: Status: RESOLVED | Noted: 2022-09-05 | Resolved: 2022-12-06

## 2022-12-06 PROBLEM — M47.816 SPONDYLOSIS WITHOUT MYELOPATHY OR RADICULOPATHY, LUMBAR REGION: Status: ACTIVE | Noted: 2022-12-05

## 2022-12-06 PROCEDURE — 99213 OFFICE O/P EST LOW 20 MIN: CPT | Performed by: PHYSICIAN ASSISTANT

## 2022-12-06 RX ORDER — HYDROCODONE BITARTRATE AND ACETAMINOPHEN 10; 325 MG/1; MG/1
1 TABLET ORAL EVERY 8 HOURS PRN
Qty: 90 TABLET | Refills: 0 | Status: SHIPPED | OUTPATIENT
Start: 2022-12-06 | End: 2023-01-05

## 2022-12-06 RX ORDER — LIDOCAINE 50 MG/G
OINTMENT TOPICAL
COMMUNITY
Start: 2022-12-05 | End: 2023-01-11

## 2022-12-06 ASSESSMENT — FIBROSIS 4 INDEX: FIB4 SCORE: 1.04

## 2022-12-06 NOTE — PROGRESS NOTES
Chief Complaint   Patient presents with    Medication Refill       HISTORY OF PRESENT ILLNESS: Patient is a 62 y.o. female established patient who presents today to discuss the following issues:    Assessment/Plan  Chronic use of opiate drug for therapeutic purpose  Chronic therapy for her chronic pain syndrome. At last visit we discussed that her pain management provider discharged her from their clinic due to a disagreement with her spouse. A new referral was placed for pain management, drug screen was done, controlled substance agreement was signed and a 30 day supply of pain medication was provided to cover her until she was seen by her new provider. The new provider repeated all of the testing and told her that she would receive no medications for another month. She was told to return to her PCP for a refill. I have explained that I do not do pain medications and she understands and does not know what else to do. She is afraid of stopping the medications and going through withdrawal. She does not abuse the medication and has always been compliant with all testing but she has been on chronic therapy for over 10 years. I will give her one last 30 day supply to cover until her next appointment. She understands that she will get no further refills through our office.   Obtained and reviewed patient utilization report from [facility] pharmacy database on 12/6/2022 10:21 AM  prior to writing prescription for controlled substance II, III or IV per [place] bill . Based on assessment of the report, the prescription is medically necessary.       Reviewed risks and benefits of treatment plan. Patient verbally agrees to plan of care.     Patient Active Problem List    Diagnosis Date Noted    Spondylosis without myelopathy or radiculopathy, lumbar region 12/05/2022    Iliotibial band syndrome, left leg 12/05/2022    Iliotibial band syndrome, right leg 12/05/2022    Chronic pain syndrome 12/05/2022    Well woman exam  with routine gynecological exam 07/07/2021    Gastroesophageal reflux disease without esophagitis 02/10/2021    Need for vaccination 02/10/2021    Allergy-induced asthma, mild intermittent, uncomplicated 09/02/2020    Mixed stress and urge urinary incontinence 09/10/2019    Class 2 obesity due to excess calories with body mass index (BMI) of 39.0 to 39.9 in adult 08/13/2019    Picking own skin 08/13/2019    Essential hypertension 04/10/2018    History of methamphetamine use 04/10/2018    Prediabetes 04/20/2017    Mild hyperlipidemia 04/20/2017    Chronic use of opiate drug for therapeutic purpose 03/30/2017    Pain of both hip joints 02/01/2017    Chronic right shoulder pain 02/01/2017    Bipolar 2 disorder, major depressive episode (HCC) 05/03/2016    Insomnia 05/03/2016    Bilateral low back pain with right-sided sciatica 03/04/2016    Anxiety 03/04/2016       Allergies:Tetanus toxoids    Current Outpatient Medications   Medication Sig Dispense Refill    HYDROcodone/acetaminophen (NORCO)  MG Tab Take 1 Tablet by mouth every 8 hours as needed for Moderate Pain or Severe Pain for up to 30 days. 90 Tablet 0    doxepin (SINEQUAN) 25 MG Cap       VRAYLAR 3 MG Cap       VRAYLAR 1.5 & 3 MG Capsule Therapy Pack       lisinopril-hydrochlorothiazide (PRINZIDE) 20-25 MG per tablet Take 1 Tablet by mouth every day. PRINZIDE 90 Tablet 3    amLODIPine (NORVASC) 2.5 MG Tab Take 1 Tablet by mouth every day. 90 Tablet 3    omeprazole (PRILOSEC) 20 MG delayed-release capsule Take 1 Capsule by mouth every day. 90 Capsule 3    albuterol (VENTOLIN HFA) 108 (90 Base) MCG/ACT Aero Soln inhalation aerosol Inhale 2 Puffs every 6 hours as needed for Shortness of Breath. 18 g 1    hydrocortisone 1 % Cream APPLY TOPICALLY TO AFFECTED AREAS TWO TIMES A DAY FOR 10 DAYS 28.4 g 0    cetirizine (ZYRTEC) 10 MG Tab TAKE ONE TABLET BY MOUTH EVERY DAY 90 Tablet 1    OLANZapine (ZYPREXA) 5 MG Tab Take 1 Tablet by mouth every evening.       "BEPREVE 1.5 % Solution       lamotrigine (LAMICTAL) 200 MG tablet Take 1 Tablet by mouth every day.      diclofenac sodium (VOLTAREN) 1 % Gel       hydrOXYzine HCl (ATARAX) 50 MG Tab Take 1 Tablet by mouth 3 times a day.      busPIRone (BUSPAR) 10 MG Tab tablet   0    lidocaine (XYLOCAINE) 5 % Ointment        No current facility-administered medications for this visit.       Wt Readings from Last 3 Encounters:   12/06/22 102 kg (224 lb)   11/11/22 103 kg (226 lb)   08/11/22 99 kg (218 lb 4.8 oz)   ]  Patient's last menstrual period was 09/02/2010 (within months).    Exam:  /64 (BP Location: Right arm, Patient Position: Sitting, BP Cuff Size: Adult long)   Pulse 79   Temp 36.2 °C (97.1 °F) (Temporal)   Resp 20   Ht 1.618 m (5' 3.7\")   Wt 102 kg (224 lb)   SpO2 95%  Body mass index is 38.81 kg/m².   General:  Well nourished, well developed female. No apparent distress. Not ill appearing.  Eyes: EOM intact, PERRL, conjunctiva non-injected, sclera non-icteric.  Neck: Supple with no cervical lymphadenopathy, JVD, palpable thyroid nodules or carotid bruits.  Pulmonary: Clear to ausculation bilaterally. Normal effort. No rales, ronchi, or wheezing.  Cardiovascular: Regular rate and rhythm without murmur, rub or gallop.   Extremities: Full range of motion. Warm and well perfused with no edema.  Skin: Intact with no obvious rashes or lesions.  Neuro: Cranial nerves I-XII grossly intact.  Psych: Alert and oriented x 3.  Appropriately dressed. Mood and affect appropriate.    Return in about 2 months (around 2/10/2023).  Please note that this dictation was created using voice recognition software. I have made every reasonable attempt to correct obvious errors, but I expect that there are errors of grammar and possibly content that I did not discover before finalizing the note.    "

## 2022-12-07 NOTE — ASSESSMENT & PLAN NOTE
Chronic therapy for her chronic pain syndrome. At last visit we discussed that her pain management provider discharged her from their clinic due to a disagreement with her spouse. A new referral was placed for pain management, drug screen was done, controlled substance agreement was signed and a 30 day supply of pain medication was provided to cover her until she was seen by her new provider. The new provider repeated all of the testing and told her that she would receive no medications for another month. She was told to return to her PCP for a refill. I have explained that I do not do pain medications and she understands and does not know what else to do. She is afraid of stopping the medications and going through withdrawal. She does not abuse the medication and has always been compliant with all testing but she has been on chronic therapy for over 10 years. I will give her one last 30 day supply to cover until her next appointment. She understands that she will get no further refills through our office.   Obtained and reviewed patient utilization report from [facility] pharmacy database on 12/6/2022 10:21 AM  prior to writing prescription for controlled substance II, III or IV per [place] bill . Based on assessment of the report, the prescription is medically necessary.

## 2023-01-11 ENCOUNTER — OFFICE VISIT (OUTPATIENT)
Dept: MEDICAL GROUP | Facility: CLINIC | Age: 63
End: 2023-01-11
Payer: MEDICAID

## 2023-01-11 DIAGNOSIS — G89.29 CHRONIC RIGHT SHOULDER PAIN: ICD-10-CM

## 2023-01-11 DIAGNOSIS — G89.29 CHRONIC BILATERAL LOW BACK PAIN WITH RIGHT-SIDED SCIATICA: ICD-10-CM

## 2023-01-11 DIAGNOSIS — F42.4 PICKING OWN SKIN: ICD-10-CM

## 2023-01-11 DIAGNOSIS — M25.511 CHRONIC RIGHT SHOULDER PAIN: ICD-10-CM

## 2023-01-11 DIAGNOSIS — Z23 NEED FOR VACCINATION: ICD-10-CM

## 2023-01-11 DIAGNOSIS — M54.41 CHRONIC BILATERAL LOW BACK PAIN WITH RIGHT-SIDED SCIATICA: ICD-10-CM

## 2023-01-11 PROBLEM — G89.4 CHRONIC PAIN SYNDROME: Status: ACTIVE | Noted: 2022-10-31

## 2023-01-11 PROCEDURE — 90677 PCV20 VACCINE IM: CPT | Performed by: PHYSICIAN ASSISTANT

## 2023-01-11 PROCEDURE — 90471 IMMUNIZATION ADMIN: CPT | Performed by: PHYSICIAN ASSISTANT

## 2023-01-11 PROCEDURE — 99213 OFFICE O/P EST LOW 20 MIN: CPT | Mod: 25 | Performed by: PHYSICIAN ASSISTANT

## 2023-01-11 RX ORDER — BENZOCAINE/MENTHOL 6 MG-10 MG
LOZENGE MUCOUS MEMBRANE
Qty: 45 G | Refills: 0 | Status: SHIPPED | OUTPATIENT
Start: 2023-01-11

## 2023-01-11 RX ORDER — HYDROXYZINE HYDROCHLORIDE 25 MG/1
1 TABLET, FILM COATED ORAL
COMMUNITY
Start: 2023-01-10

## 2023-01-11 RX ORDER — VENLAFAXINE HYDROCHLORIDE 37.5 MG/1
1 CAPSULE, EXTENDED RELEASE ORAL DAILY
COMMUNITY
Start: 2023-01-10

## 2023-01-11 ASSESSMENT — FIBROSIS 4 INDEX: FIB4 SCORE: 1.04

## 2023-01-11 NOTE — PROGRESS NOTES
Chief Complaint   Patient presents with    Medication Refill     Hydrocortisone   Voltaren gel     Referral Needed     [place] fracture - pt sts she was referred to the wrong dr last time        HISTORY OF PRESENT ILLNESS: Patient is a 62 y.o. female established patient who presents today to discuss the following issues:    Assessment/Plan  Bilateral low back pain with right-sided sciatica  Patient states that her referral for pain management was sent to the wrong clinic. I explained to her that they were having a hard time finding someone to take her as a patient. She wants to go to the same pain management clinic that her  does. She said that she has talked with them and they have agreed to take her on. She has an approved referral and she is going to hand carry the referral herself to the clinic. She is upset that I will not refill her medications again. She has been given 60 days to find a new pain provider. She will make an appointment with them tomorrow. She has also requested a refill of the Voltaren gel for her shoulder and back.    Need for vaccination  Patient due for pneumonia vaccine. Given in clinic today.      Reviewed risks and benefits of treatment plan. Patient verbally agrees to plan of care.     Patient Active Problem List    Diagnosis Date Noted    Spondylosis without myelopathy or radiculopathy, lumbar region 12/05/2022    Iliotibial band syndrome, left leg 12/05/2022    Iliotibial band syndrome, right leg 12/05/2022    Chronic pain syndrome 10/31/2022    Well woman exam with routine gynecological exam 07/07/2021    Gastroesophageal reflux disease without esophagitis 02/10/2021    Need for vaccination 02/10/2021    Allergy-induced asthma, mild intermittent, uncomplicated 09/02/2020    Mixed stress and urge urinary incontinence 09/10/2019    Class 2 obesity due to excess calories with body mass index (BMI) of 39.0 to 39.9 in adult 08/13/2019    Picking own skin 08/13/2019    Essential  hypertension 04/10/2018    History of methamphetamine use 04/10/2018    Prediabetes 04/20/2017    Mild hyperlipidemia 04/20/2017    Chronic use of opiate drug for therapeutic purpose 03/30/2017    Pain of both hip joints 02/01/2017    Chronic right shoulder pain 02/01/2017    Bipolar 2 disorder, major depressive episode (HCC) 05/03/2016    Insomnia 05/03/2016    Bilateral low back pain with right-sided sciatica 03/04/2016    Anxiety 03/04/2016       Allergies:Tetanus toxoids    Current Outpatient Medications   Medication Sig Dispense Refill    diclofenac sodium (VOLTAREN) 1 % Gel Apply 2 g topically 4 times a day as needed (for joint pain). 300 g 2    hydrocortisone 1 % Cream APPLY TOPICALLY TO AFFECTED AREAS TWO TIMES A DAY FOR 10 DAYS 45 g 0    doxepin (SINEQUAN) 25 MG Cap Pt takes 2 for 50 mg      lisinopril-hydrochlorothiazide (PRINZIDE) 20-25 MG per tablet Take 1 Tablet by mouth every day. PRINZIDE 90 Tablet 3    amLODIPine (NORVASC) 2.5 MG Tab Take 1 Tablet by mouth every day. 90 Tablet 3    omeprazole (PRILOSEC) 20 MG delayed-release capsule Take 1 Capsule by mouth every day. 90 Capsule 3    albuterol (VENTOLIN HFA) 108 (90 Base) MCG/ACT Aero Soln inhalation aerosol Inhale 2 Puffs every 6 hours as needed for Shortness of Breath. 18 g 1    cetirizine (ZYRTEC) 10 MG Tab TAKE ONE TABLET BY MOUTH EVERY DAY 90 Tablet 1    OLANZapine (ZYPREXA) 5 MG Tab Take 1 Tablet by mouth every evening.      lamotrigine (LAMICTAL) 200 MG tablet Take 1 Tablet by mouth every day.      busPIRone (BUSPAR) 10 MG Tab tablet   0    venlafaxine XR (EFFEXOR XR) 37.5 MG CAPSULE SR 24 HR Take 1 Capsule by mouth every day.      LYRICA 50 MG capsule Take 1 Capsule by mouth in the morning, at noon, and at bedtime.      hydrOXYzine HCl (ATARAX) 25 MG Tab Take 1 Tablet by mouth 2 times a day. Take one tablet in the morning and two at night.       No current facility-administered medications for this visit.       Wt Readings from Last 3  Encounters:   01/11/23 (P) 97.2 kg (214 lb 3.2 oz)   12/06/22 102 kg (224 lb)   11/11/22 103 kg (226 lb)   ]  Patient's last menstrual period was 09/02/2010 (within months).    Exam:  There were no vitals taken for this visit. Body mass index is 36.2 kg/m² (pended).   General:  Well nourished, well developed female. No apparent distress. Not ill appearing.  Eyes: EOM intact, PERRL, conjunctiva non-injected, sclera non-icteric.  Neck: Supple with no cervical lymphadenopathy, JVD, palpable thyroid nodules or carotid bruits.  Pulmonary: Clear to ausculation bilaterally. Normal effort. No rales, ronchi, or wheezing.  Cardiovascular: Regular rate and rhythm without murmur, rub or gallop.   Extremities: Full range of motion. Warm and well perfused with no edema.  Skin: Intact with no obvious rashes or lesions.  Neuro: Cranial nerves I-XII grossly intact.  Psych: Alert and oriented x 3.  Appropriately dressed. Mood and affect appropriate.    Return in about 30 days (around 2/10/2023).  Please note that this dictation was created using voice recognition software. I have made every reasonable attempt to correct obvious errors, but I expect that there are errors of grammar and possibly content that I did not discover before finalizing the note.

## 2023-01-12 NOTE — ASSESSMENT & PLAN NOTE
Patient states that her referral for pain management was sent to the wrong clinic. I explained to her that they were having a hard time finding someone to take her as a patient. She wants to go to the same pain management clinic that her  does. She said that she has talked with them and they have agreed to take her on. She has an approved referral and she is going to hand carry the referral herself to the clinic. She is upset that I will not refill her medications again. She has been given 60 days to find a new pain provider. She will make an appointment with them tomorrow. She has also requested a refill of the Voltaren gel for her shoulder and back.

## 2023-02-10 ENCOUNTER — OFFICE VISIT (OUTPATIENT)
Dept: MEDICAL GROUP | Facility: CLINIC | Age: 63
End: 2023-02-10
Payer: MEDICAID

## 2023-02-10 VITALS
HEIGHT: 65 IN | DIASTOLIC BLOOD PRESSURE: 68 MMHG | RESPIRATION RATE: 20 BRPM | TEMPERATURE: 98.7 F | HEART RATE: 84 BPM | WEIGHT: 211 LBS | BODY MASS INDEX: 35.16 KG/M2 | SYSTOLIC BLOOD PRESSURE: 110 MMHG | OXYGEN SATURATION: 96 %

## 2023-02-10 DIAGNOSIS — Z12.12 SCREENING FOR COLORECTAL CANCER: ICD-10-CM

## 2023-02-10 DIAGNOSIS — Z12.11 SCREENING FOR COLORECTAL CANCER: ICD-10-CM

## 2023-02-10 DIAGNOSIS — G89.29 CHRONIC RIGHT SHOULDER PAIN: ICD-10-CM

## 2023-02-10 DIAGNOSIS — M25.511 CHRONIC RIGHT SHOULDER PAIN: ICD-10-CM

## 2023-02-10 DIAGNOSIS — E66.09 CLASS 2 OBESITY DUE TO EXCESS CALORIES WITHOUT SERIOUS COMORBIDITY WITH BODY MASS INDEX (BMI) OF 35.0 TO 35.9 IN ADULT: ICD-10-CM

## 2023-02-10 DIAGNOSIS — M54.41 CHRONIC BILATERAL LOW BACK PAIN WITH RIGHT-SIDED SCIATICA: ICD-10-CM

## 2023-02-10 DIAGNOSIS — G89.29 CHRONIC BILATERAL LOW BACK PAIN WITH RIGHT-SIDED SCIATICA: ICD-10-CM

## 2023-02-10 PROBLEM — M54.16 LUMBAR RADICULOPATHY: Status: ACTIVE | Noted: 2022-12-05

## 2023-02-10 PROCEDURE — 99214 OFFICE O/P EST MOD 30 MIN: CPT | Performed by: PHYSICIAN ASSISTANT

## 2023-02-10 RX ORDER — LIDOCAINE 50 MG/G
OINTMENT TOPICAL
COMMUNITY

## 2023-02-10 RX ORDER — MELOXICAM 15 MG/1
15 TABLET ORAL DAILY
Qty: 90 TABLET | Refills: 1 | Status: SHIPPED | OUTPATIENT
Start: 2023-02-10 | End: 2023-11-28

## 2023-02-10 ASSESSMENT — FIBROSIS 4 INDEX: FIB4 SCORE: 1.04

## 2023-02-10 NOTE — PROGRESS NOTES
Chief Complaint   Patient presents with    Back Pain     Fv        HISTORY OF PRESENT ILLNESS: Patient is a 62 y.o. female established patient who presents today to discuss the following issues:    Assessment/Plan  Bilateral low back pain with right-sided sciatica  Chronic condition.  Patient has requested to go back on meloxicam 15 mg as this medication seems to work best for her to control her back pain.  Prescription has been refilled.  She will follow-up in 3 months    Class 2 obesity due to excess calories with body mass index (BMI) of 35.0 to 35.9 in adult  Body mass index is 35.66 kg/m². Patient has been diagnosed with obesity and again has been counseled on caloric and carbohydrate restriction along with increasing exercise to 30 minutes 5 or more times a week.  Patient has lost 13 pounds in the last 2 months.  She is working on losing more.  We will continue to monitor her progress.      Reviewed risks and benefits of treatment plan. Patient verbally agrees to plan of care.     Patient Active Problem List    Diagnosis Date Noted    Lumbar radiculopathy 12/05/2022    Iliotibial band syndrome, left leg 12/05/2022    Iliotibial band syndrome, right leg 12/05/2022    Chronic pain syndrome 10/31/2022    Well woman exam with routine gynecological exam 07/07/2021    Gastroesophageal reflux disease without esophagitis 02/10/2021    Need for vaccination 02/10/2021    Allergy-induced asthma, mild intermittent, uncomplicated 09/02/2020    Mixed stress and urge urinary incontinence 09/10/2019    Class 2 obesity due to excess calories with body mass index (BMI) of 35.0 to 35.9 in adult 08/13/2019    Picking own skin 08/13/2019    Essential hypertension 04/10/2018    History of methamphetamine use 04/10/2018    Prediabetes 04/20/2017    Mild hyperlipidemia 04/20/2017    Chronic use of opiate drug for therapeutic purpose 03/30/2017    Pain of both hip joints 02/01/2017    Chronic right shoulder pain 02/01/2017    Bipolar  2 disorder, major depressive episode (HCC) 05/03/2016    Insomnia 05/03/2016    Bilateral low back pain with right-sided sciatica 03/04/2016    Anxiety 03/04/2016       Allergies:Tetanus toxoids    Current Outpatient Medications   Medication Sig Dispense Refill    meloxicam (MOBIC) 15 MG tablet Take 1 Tablet by mouth every day. 90 Tablet 1    cetirizine (ZYRTEC) 10 MG Tab TAKE ONE TABLET BY MOUTH EVERY DAY 90 Tablet 1    venlafaxine XR (EFFEXOR XR) 37.5 MG CAPSULE SR 24 HR Take 1 Capsule by mouth every day.      LYRICA 50 MG capsule Take 1 Capsule by mouth in the morning, at noon, and at bedtime.      hydrOXYzine HCl (ATARAX) 25 MG Tab Take 1 Tablet by mouth 2 times a day. Take one tablet in the morning and two at night.      diclofenac sodium (VOLTAREN) 1 % Gel Apply 2 g topically 4 times a day as needed (for joint pain). 300 g 2    hydrocortisone 1 % Cream APPLY TOPICALLY TO AFFECTED AREAS TWO TIMES A DAY FOR 10 DAYS 45 g 0    doxepin (SINEQUAN) 25 MG Cap Pt takes 2 for 50 mg      lisinopril-hydrochlorothiazide (PRINZIDE) 20-25 MG per tablet Take 1 Tablet by mouth every day. PRINZIDE 90 Tablet 3    amLODIPine (NORVASC) 2.5 MG Tab Take 1 Tablet by mouth every day. 90 Tablet 3    omeprazole (PRILOSEC) 20 MG delayed-release capsule Take 1 Capsule by mouth every day. 90 Capsule 3    albuterol (VENTOLIN HFA) 108 (90 Base) MCG/ACT Aero Soln inhalation aerosol Inhale 2 Puffs every 6 hours as needed for Shortness of Breath. 18 g 1    OLANZapine (ZYPREXA) 5 MG Tab Take 1 Tablet by mouth every evening.      lamotrigine (LAMICTAL) 200 MG tablet Take 1 Tablet by mouth every day.      busPIRone (BUSPAR) 10 MG Tab tablet   0    lidocaine (XYLOCAINE) 5 % Ointment lidocaine 5 % topical ointment       No current facility-administered medications for this visit.       Wt Readings from Last 3 Encounters:   02/10/23 95.7 kg (211 lb)   01/11/23 (P) 97.2 kg (214 lb 3.2 oz)   12/06/22 102 kg (224 lb)   ]  Patient's last menstrual period  "was 09/02/2010 (within months).    Exam:  /68 (BP Location: Right arm, Patient Position: Sitting, BP Cuff Size: Adult long)   Pulse 84   Temp 37.1 °C (98.7 °F) (Temporal)   Resp 20   Ht 1.638 m (5' 4.5\")   Wt 95.7 kg (211 lb)   SpO2 96%  Body mass index is 35.66 kg/m².   General:  Well nourished, well developed female. No apparent distress. Not ill appearing.  Eyes: EOM intact, PERRL, conjunctiva non-injected, sclera non-icteric.  Neck: Supple with no cervical lymphadenopathy, JVD, palpable thyroid nodules or carotid bruits.  Pulmonary: Clear to ausculation bilaterally. Normal effort. No rales, ronchi, or wheezing.  Cardiovascular: Regular rate and rhythm without murmur, rub or gallop.   Extremities:  Warm and well perfused with no edema.  Skin: Intact with no obvious rashes or lesions.  Neuro: Cranial nerves I-XII grossly intact.  Psych: Alert and oriented x 3.  Appropriately dressed. Mood and affect appropriate.    Return in about 3 months (around 5/10/2023).  Please note that this dictation was created using voice recognition software. I have made every reasonable attempt to correct obvious errors, but I expect that there are errors of grammar and possibly content that I did not discover before finalizing the note.    "

## 2023-03-02 NOTE — ASSESSMENT & PLAN NOTE
Chronic condition.  Patient has requested to go back on meloxicam 15 mg as this medication seems to work best for her to control her back pain.  Prescription has been refilled.  She will follow-up in 3 months

## 2023-03-02 NOTE — ASSESSMENT & PLAN NOTE
Body mass index is 35.66 kg/m². Patient has been diagnosed with obesity and again has been counseled on caloric and carbohydrate restriction along with increasing exercise to 30 minutes 5 or more times a week.  Patient has lost 13 pounds in the last 2 months.  She is working on losing more.  We will continue to monitor her progress.

## 2023-05-31 ENCOUNTER — APPOINTMENT (OUTPATIENT)
Dept: MEDICAL GROUP | Facility: CLINIC | Age: 63
End: 2023-05-31
Payer: MEDICAID

## 2023-08-03 ENCOUNTER — APPOINTMENT (OUTPATIENT)
Dept: MEDICAL GROUP | Facility: CLINIC | Age: 63
End: 2023-08-03
Payer: MEDICAID

## 2023-08-07 ENCOUNTER — APPOINTMENT (OUTPATIENT)
Dept: MEDICAL GROUP | Facility: CLINIC | Age: 63
End: 2023-08-07
Payer: MEDICAID

## 2023-08-31 NOTE — PROGRESS NOTES
Chief Complaint   Patient presents with    Lab Results       HISTORY OF PRESENT ILLNESS: Patient is a 61 y.o. female established patient who presents today to discuss the following issues:    Prediabetes  Chronic condition. Current A1c is 5.7%. This has increased slightly from 5.5% a year ago. She states that she will start getting back on a more healthy diet as she has admittedly not been as strict as she should be. Will recheck labs in one year.    Ambulates with cane  She currently ambulates with a cane. She states that she  Is having a harder time walking due to pain in her lower back and bilateral hips. She states that she has almost fallen a few times and is terrified of breaking a hip if she falls. She is requesting to advance to a walker so that she has something to hold on to if she feels she might fall. Order placed today for DME walker.    Pain of both hip joints  Chronic condition. She is managed by pain management for her Norco. She feels that her hips are getting worse and she sometimes feels that she might fall. Recommend that she participate in some physical therapy to help strengthen her hips to help her feel more stable. She declines at this time and requests a FWW for when she is outside of her house.    Mild hyperlipidemia  Chronic condition. Current lipid panel is total chol 209, Tri 108, HDL 50, .   I recommend decreasing intake of saturated fats which are found in meats that come from a cow or pig. Saturated fats are also found in creams, cheeses, butter, mayonnaise, and fried foods. I recommend increasing intake of vegetables, fruits, fish, and healthy oils like olive oil. Recommend increased physical activity as tolerated. This exercise should last at least 30 minutes and occur 5 or more days per week. Will recheck labs in one year.      Patient Active Problem List    Diagnosis Date Noted    Ambulates with cane 09/05/2022    Acute non-recurrent frontal sinusitis 06/09/2022    Well  Please see the attached refill request.   woman exam with routine gynecological exam 07/07/2021    Gastroesophageal reflux disease without esophagitis 02/10/2021    Environmental and seasonal allergies 09/02/2020    Mixed stress and urge urinary incontinence 09/10/2019    Class 2 obesity due to excess calories with body mass index (BMI) of 39.0 to 39.9 in adult 08/13/2019    Picking own skin 08/13/2019    Essential hypertension 04/10/2018    History of methamphetamine use 04/10/2018    Uncomplicated opioid dependence (HCC) 04/10/2018    Pain management contract agreement 01/05/2018    Prediabetes 04/20/2017    Mild hyperlipidemia 04/20/2017    Pain of both hip joints 02/01/2017    Chronic right shoulder pain 02/01/2017    Bipolar 2 disorder, major depressive episode (HCC) 05/03/2016    Insomnia 05/03/2016    Bilateral low back pain with right-sided sciatica 03/04/2016    Anxiety 03/04/2016       Allergies:Tetanus toxoids    Current Outpatient Medications   Medication Sig Dispense Refill    lisinopril-hydrochlorothiazide (PRINZIDE) 20-25 MG per tablet TAKE ONE TABLET BY MOUTH DAILY  PRINZIDE 30 Tablet 0    hydrocortisone 1 % Cream APPLY TOPICALLY TO AFFECTED AREAS TWO TIMES A DAY FOR 10 DAYS 28.4 g 0    cyclobenzaprine (FLEXERIL) 5 mg tablet       cetirizine (ZYRTEC) 10 MG Tab TAKE ONE TABLET BY MOUTH EVERY DAY 90 Tablet 1    OLANZapine (ZYPREXA) 5 MG Tab Take 5 mg by mouth every evening.      Mirabegron ER 50 MG TABLET SR 24 HR Take  by mouth.      BEPREVE 1.5 % Solution       VENTOLIN  (90 Base) MCG/ACT Aero Soln inhalation aerosol INHALE 2 PUFFS BY MOUTH EVERY 6 HOURS AS NEEDED FOR SHORTNESS OF BREATH 18 g 1    lamotrigine (LAMICTAL) 200 MG tablet Take 1 Tablet by mouth every day.      diclofenac sodium (VOLTAREN) 1 % Gel       hydrOXYzine HCl (ATARAX) 50 MG Tab Take 1 Tab by mouth 3 times a day.      busPIRone (BUSPAR) 10 MG Tab tablet   0    meloxicam (MOBIC) 15 MG tablet TAKE ONE TABLET BY MOUTH DAILY 30 Tab 2    hydrocodone/acetaminophen  (NORCO)  MG Tab Take 1-2 Tabs by mouth every 6 hours as needed.      amLODIPine (NORVASC) 2.5 MG Tab TAKE 1 TABLET BY MOUTH EVERY DAY. 30 Tablet 3    venlafaxine XR (EFFEXOR XR) 37.5 MG CAPSULE SR 24 HR Take 1 Cap by mouth every day.       No current facility-administered medications for this visit.       Social History     Tobacco Use    Smoking status: Former     Packs/day: 1.00     Years: 35.00     Pack years: 35.00     Types: Cigarettes     Quit date: 2018     Years since quittin.3    Smokeless tobacco: Never   Vaping Use    Vaping Use: Former    Quit date: 2016    Substances: CBD   Substance Use Topics    Alcohol use: Yes     Comment: 1 glass wine/mo    Drug use: Yes     Types: Marijuana     Comment: occ       Family Status   Relation Name Status    Mo  Alive    Fa  Alive    Sis  Alive    Bro  Alive    Bro  Alive    Bro  Alive     Family History   Problem Relation Age of Onset    Lung Disease Mother         emphysema    Cancer Father     Heart Disease Father     Lung Disease Brother     Lung Disease Brother        Review of Systems:   Constitutional: Negative for fever, chills, weight loss and malaise/fatigue.   HENT: Negative for ear pain, nosebleeds, congestion, sore throat and neck pain.    Eyes: Negative for blurred vision.   Respiratory: Negative for cough, sputum production, shortness of breath and wheezing.    Cardiovascular: Negative for chest pain, palpitations, orthopnea and leg swelling.   Gastrointestinal: Negative for heartburn, nausea, vomiting and abdominal pain.   Genitourinary: Negative for dysuria, urgency and frequency.   Musculoskeletal: Negative for myalgias. Positive for back pain and joint pain.   Skin: Negative for rash and itching.   Neurological: Negative for dizziness, tingling, tremors, sensory change, focal weakness and headaches.   Endo/Heme/Allergies: Does not bruise/bleed easily.   Psychiatric/Behavioral: Negative for depression, suicidal ideas and memory loss.  " The patient is not nervous/anxious and does not have insomnia.    All other systems reviewed and are negative except as in HPI.    Wt Readings from Last 3 Encounters:   08/11/22 99 kg (218 lb 4.8 oz)   06/09/22 105 kg (230 lb 12.8 oz)   01/18/22 105 kg (230 lb 6.4 oz)   ]  Patient's last menstrual period was 09/02/2010 (within months).    Exam:  /74 (BP Location: Right arm, Patient Position: Sitting, BP Cuff Size: Adult)   Pulse 93   Temp 36.1 °C (96.9 °F) (Temporal)   Resp 20   Ht 1.618 m (5' 3.7\")   Wt 99 kg (218 lb 4.8 oz)   SpO2 93%  Body mass index is 37.82 kg/m².   General:  Well nourished, well developed female. No apparent distress. Not ill appearing.  Eyes: EOM intact, PERRL, conjunctiva non-injected, sclera non-icteric.  Neck: Supple with no cervical lymphadenopathy, JVD, palpable thyroid nodules or carotid bruits.  Pulmonary: Clear to ausculation bilaterally. Normal effort. No rales, ronchi, or wheezing.  Cardiovascular: Regular rate and rhythm without murmur, rub or gallop.   Extremities: Decreased range of motion in bilateral lower extremities. Warm and well perfused with no edema.  Skin: Intact with no obvious rashes or lesions.  Neuro: Cranial nerves I-XII grossly intact.  Psych: Alert and oriented x 3.  Appropriately dressed. Mood and affect appropriate.    Assessment/Plan:  1. Prediabetes  POCT Hemoglobin A1C      2. Chronic bilateral low back pain with right-sided sciatica  DME Walker      3. Pain of both hip joints  DME Walker      4. Ambulates with cane  DME Walker      5. Mild hyperlipidemia            Reviewed risks and benefits of treatment plan. Patient verbally agrees to plan of care.     Return in about 3 months (around 11/11/2022).    Please note that this dictation was created using voice recognition software. I have made every reasonable attempt to correct obvious errors, but I expect that there are errors of grammar and possibly content that I did not discover before " finalizing the note.

## 2023-09-07 ENCOUNTER — APPOINTMENT (OUTPATIENT)
Dept: MEDICAL GROUP | Facility: CLINIC | Age: 63
End: 2023-09-07
Payer: MEDICAID

## 2023-10-09 ENCOUNTER — TELEPHONE (OUTPATIENT)
Dept: HEALTH INFORMATION MANAGEMENT | Facility: OTHER | Age: 63
End: 2023-10-09
Payer: MEDICAID

## 2023-11-20 DIAGNOSIS — J30.89 CHRONIC NON-SEASONAL ALLERGIC RHINITIS: ICD-10-CM

## 2023-11-20 RX ORDER — CETIRIZINE HYDROCHLORIDE 10 MG/1
TABLET ORAL
Qty: 30 TABLET | Refills: 0 | Status: SHIPPED | OUTPATIENT
Start: 2023-11-20

## 2023-11-20 NOTE — TELEPHONE ENCOUNTER
Received request via: Pharmacy    Was the patient seen in the last year in this department? Yes    Does the patient have an active prescription (recently filled or refills available) for medication(s) requested? No    Does the patient have alf Plus and need 100 day supply (blood pressure, diabetes and cholesterol meds only)? Medication is not for cholesterol, blood pressure or diabetes    Last OV: 2/10/23  Last labs: 10/23/23

## 2023-11-27 DIAGNOSIS — G89.29 CHRONIC BILATERAL LOW BACK PAIN WITH RIGHT-SIDED SCIATICA: ICD-10-CM

## 2023-11-27 DIAGNOSIS — G89.29 CHRONIC RIGHT SHOULDER PAIN: ICD-10-CM

## 2023-11-27 DIAGNOSIS — M25.511 CHRONIC RIGHT SHOULDER PAIN: ICD-10-CM

## 2023-11-27 DIAGNOSIS — M54.41 CHRONIC BILATERAL LOW BACK PAIN WITH RIGHT-SIDED SCIATICA: ICD-10-CM

## 2023-11-27 NOTE — TELEPHONE ENCOUNTER
Received request via: Pharmacy    Was the patient seen in the last year in this department? Yes    Does the patient have an active prescription (recently filled or refills available) for medication(s) requested? No    Does the patient have retirement Plus and need 100 day supply (blood pressure, diabetes and cholesterol meds only)? Medication is not for cholesterol, blood pressure or diabetes    Last OV: 2/10/23  Last labs: 10/23/23

## 2023-11-28 RX ORDER — MELOXICAM 15 MG/1
15 TABLET ORAL DAILY
Qty: 30 TABLET | Refills: 0 | Status: SHIPPED | OUTPATIENT
Start: 2023-11-28 | End: 2024-01-29

## 2024-01-29 DIAGNOSIS — G89.29 CHRONIC RIGHT SHOULDER PAIN: ICD-10-CM

## 2024-01-29 DIAGNOSIS — M25.511 CHRONIC RIGHT SHOULDER PAIN: ICD-10-CM

## 2024-01-29 DIAGNOSIS — G89.29 CHRONIC BILATERAL LOW BACK PAIN WITH RIGHT-SIDED SCIATICA: ICD-10-CM

## 2024-01-29 DIAGNOSIS — M54.41 CHRONIC BILATERAL LOW BACK PAIN WITH RIGHT-SIDED SCIATICA: ICD-10-CM

## 2024-01-29 RX ORDER — MELOXICAM 15 MG/1
15 TABLET ORAL DAILY
Qty: 30 TABLET | Refills: 0 | Status: SHIPPED | OUTPATIENT
Start: 2024-01-29

## 2024-01-30 NOTE — TELEPHONE ENCOUNTER
Received request via: Patient    Was the patient seen in the last year in this department? Yes    Does the patient have an active prescription (recently filled or refills available) for medication(s) requested? No    Does the patient have detention Plus and need 100 day supply (blood pressure, diabetes and cholesterol meds only)? Patient does not have SCP      Last Ov 2/10/2023  Last Labs 10/23/2023

## 2024-03-05 NOTE — TELEPHONE ENCOUNTER
PAT done via phone. Preop instructions reviewed with verbalized understanding. Also sent to [facility] for review. Patient to come in for bloodwork only after seeing Dr. James at 0830. Patient aware.   Was the patient seen in the last year in this department? Yes     Does patient have an active prescription for medications requested? Yes     Received Request Via: Pharmacy

## 2024-06-12 NOTE — ASSESSMENT & PLAN NOTE
Chronic condition. She is managed by pain management for her Norco. She feels that her hips are getting worse and she sometimes feels that she might fall. Recommend that she participate in some physical therapy to help strengthen her hips to help her feel more stable. She declines at this time and requests a FWW for when she is outside of her house.   No

## 2024-08-01 ENCOUNTER — NON-PROVIDER VISIT (OUTPATIENT)
Dept: MEDICAL GROUP | Facility: CLINIC | Age: 64
End: 2024-08-01
Payer: MEDICAID

## 2024-08-01 ENCOUNTER — HOSPITAL ENCOUNTER (OUTPATIENT)
Facility: MEDICAL CENTER | Age: 64
End: 2024-08-01
Payer: MEDICAID

## 2024-08-01 ENCOUNTER — HOSPITAL ENCOUNTER (OUTPATIENT)
Facility: MEDICAL CENTER | Age: 64
End: 2024-08-01
Attending: NURSE PRACTITIONER
Payer: MEDICAID

## 2024-08-01 LAB
BASOPHILS # BLD AUTO: 0.6 % (ref 0–1.8)
BASOPHILS # BLD: 0.04 K/UL (ref 0–0.12)
EOSINOPHIL # BLD AUTO: 0.07 K/UL (ref 0–0.51)
EOSINOPHIL NFR BLD: 1 % (ref 0–6.9)
ERYTHROCYTE [DISTWIDTH] IN BLOOD BY AUTOMATED COUNT: 46.4 FL (ref 35.9–50)
HCT VFR BLD AUTO: 40.2 % (ref 37–47)
HGB BLD-MCNC: 13.3 G/DL (ref 12–16)
IMM GRANULOCYTES # BLD AUTO: 0.02 K/UL (ref 0–0.11)
IMM GRANULOCYTES NFR BLD AUTO: 0.3 % (ref 0–0.9)
LYMPHOCYTES # BLD AUTO: 3.08 K/UL (ref 1–4.8)
LYMPHOCYTES NFR BLD: 45.8 % (ref 22–41)
MCH RBC QN AUTO: 29.8 PG (ref 27–33)
MCHC RBC AUTO-ENTMCNC: 33.1 G/DL (ref 32.2–35.5)
MCV RBC AUTO: 89.9 FL (ref 81.4–97.8)
MONOCYTES # BLD AUTO: 0.42 K/UL (ref 0–0.85)
MONOCYTES NFR BLD AUTO: 6.2 % (ref 0–13.4)
NEUTROPHILS # BLD AUTO: 3.1 K/UL (ref 1.82–7.42)
NEUTROPHILS NFR BLD: 46.1 % (ref 44–72)
NRBC # BLD AUTO: 0 K/UL
NRBC BLD-RTO: 0 /100 WBC (ref 0–0.2)
PLATELET # BLD AUTO: 306 K/UL (ref 164–446)
PMV BLD AUTO: 9.4 FL (ref 9–12.9)
RBC # BLD AUTO: 4.47 M/UL (ref 4.2–5.4)
WBC # BLD AUTO: 6.7 K/UL (ref 4.8–10.8)

## 2024-08-01 PROCEDURE — 84443 ASSAY THYROID STIM HORMONE: CPT

## 2024-08-01 PROCEDURE — 85025 COMPLETE CBC W/AUTO DIFF WBC: CPT

## 2024-08-01 PROCEDURE — 80053 COMPREHEN METABOLIC PANEL: CPT

## 2024-08-01 PROCEDURE — 80061 LIPID PANEL: CPT

## 2024-08-01 PROCEDURE — 83036 HEMOGLOBIN GLYCOSYLATED A1C: CPT

## 2024-08-01 PROCEDURE — 80061 LIPID PANEL: CPT | Mod: 91

## 2024-08-01 PROCEDURE — 80053 COMPREHEN METABOLIC PANEL: CPT | Mod: 91

## 2024-08-01 PROCEDURE — 36415 COLL VENOUS BLD VENIPUNCTURE: CPT | Performed by: PHYSICIAN ASSISTANT

## 2024-08-01 PROCEDURE — 85025 COMPLETE CBC W/AUTO DIFF WBC: CPT | Mod: 91

## 2024-08-01 PROCEDURE — 83036 HEMOGLOBIN GLYCOSYLATED A1C: CPT | Mod: 91

## 2024-08-01 PROCEDURE — 84443 ASSAY THYROID STIM HORMONE: CPT | Mod: 91

## 2024-08-01 PROCEDURE — 82746 ASSAY OF FOLIC ACID SERUM: CPT

## 2024-08-01 PROCEDURE — 82607 VITAMIN B-12: CPT

## 2024-08-01 NOTE — PROGRESS NOTES
Denisse Ramirez is a 63 y.o. female here for a non-provider visit for a lab draw on 8/1/2024 at 9:06 AM.    Procedure performed:  Venipuncture     Anatomical site:  Left Antecubital Area    Equipment used:  21 g vacutainer     Labs drawn:  A1c, Comp Metabolic Panel , CBC with differential , Lipid Profile, TSH, and B12 & folate    Ordering provider:  Harlan Ratliff PA-C & GELY Gamez    Lab draw completed by:  Marcus Campa Ass't

## 2024-08-02 LAB
ALBUMIN SERPL BCP-MCNC: 4.1 G/DL (ref 3.2–4.9)
ALBUMIN SERPL BCP-MCNC: 4.1 G/DL (ref 3.2–4.9)
ALBUMIN/GLOB SERPL: 1.6 G/DL
ALBUMIN/GLOB SERPL: 1.7 G/DL
ALP SERPL-CCNC: 70 U/L (ref 30–99)
ALP SERPL-CCNC: 73 U/L (ref 30–99)
ALT SERPL-CCNC: 13 U/L (ref 2–50)
ALT SERPL-CCNC: 14 U/L (ref 2–50)
ANION GAP SERPL CALC-SCNC: 10 MMOL/L (ref 7–16)
ANION GAP SERPL CALC-SCNC: 14 MMOL/L (ref 7–16)
AST SERPL-CCNC: 14 U/L (ref 12–45)
AST SERPL-CCNC: 18 U/L (ref 12–45)
BASOPHILS # BLD AUTO: 0.7 % (ref 0–1.8)
BASOPHILS # BLD: 0.05 K/UL (ref 0–0.12)
BILIRUB SERPL-MCNC: 0.4 MG/DL (ref 0.1–1.5)
BILIRUB SERPL-MCNC: 0.4 MG/DL (ref 0.1–1.5)
BUN SERPL-MCNC: 19 MG/DL (ref 8–22)
BUN SERPL-MCNC: 19 MG/DL (ref 8–22)
CALCIUM ALBUM COR SERPL-MCNC: 9.2 MG/DL (ref 8.5–10.5)
CALCIUM ALBUM COR SERPL-MCNC: 9.3 MG/DL (ref 8.5–10.5)
CALCIUM SERPL-MCNC: 9.3 MG/DL (ref 8.5–10.5)
CALCIUM SERPL-MCNC: 9.4 MG/DL (ref 8.5–10.5)
CHLORIDE SERPL-SCNC: 105 MMOL/L (ref 96–112)
CHLORIDE SERPL-SCNC: 106 MMOL/L (ref 96–112)
CHOLEST SERPL-MCNC: 203 MG/DL (ref 100–199)
CHOLEST SERPL-MCNC: 203 MG/DL (ref 100–199)
CO2 SERPL-SCNC: 23 MMOL/L (ref 20–33)
CO2 SERPL-SCNC: 25 MMOL/L (ref 20–33)
CREAT SERPL-MCNC: 0.88 MG/DL (ref 0.5–1.4)
CREAT SERPL-MCNC: 0.94 MG/DL (ref 0.5–1.4)
EOSINOPHIL # BLD AUTO: 0.07 K/UL (ref 0–0.51)
EOSINOPHIL NFR BLD: 1 % (ref 0–6.9)
ERYTHROCYTE [DISTWIDTH] IN BLOOD BY AUTOMATED COUNT: 46.2 FL (ref 35.9–50)
EST. AVERAGE GLUCOSE BLD GHB EST-MCNC: 97 MG/DL
FOLATE SERPL-MCNC: 5.4 NG/ML
GFR SERPLBLD CREATININE-BSD FMLA CKD-EPI: 68 ML/MIN/1.73 M 2
GFR SERPLBLD CREATININE-BSD FMLA CKD-EPI: 73 ML/MIN/1.73 M 2
GLOBULIN SER CALC-MCNC: 2.4 G/DL (ref 1.9–3.5)
GLOBULIN SER CALC-MCNC: 2.5 G/DL (ref 1.9–3.5)
GLUCOSE SERPL-MCNC: 103 MG/DL (ref 65–99)
GLUCOSE SERPL-MCNC: 99 MG/DL (ref 65–99)
HBA1C MFR BLD: 5 % (ref 4–5.6)
HCT VFR BLD AUTO: 39.9 % (ref 37–47)
HDLC SERPL-MCNC: 58 MG/DL
HDLC SERPL-MCNC: 59 MG/DL
HGB BLD-MCNC: 13.3 G/DL (ref 12–16)
IMM GRANULOCYTES # BLD AUTO: 0.02 K/UL (ref 0–0.11)
IMM GRANULOCYTES NFR BLD AUTO: 0.3 % (ref 0–0.9)
LDLC SERPL CALC-MCNC: 126 MG/DL
LDLC SERPL CALC-MCNC: 127 MG/DL
LYMPHOCYTES # BLD AUTO: 3.02 K/UL (ref 1–4.8)
LYMPHOCYTES NFR BLD: 45.1 % (ref 22–41)
MCH RBC QN AUTO: 29.6 PG (ref 27–33)
MCHC RBC AUTO-ENTMCNC: 33.3 G/DL (ref 32.2–35.5)
MCV RBC AUTO: 88.9 FL (ref 81.4–97.8)
MONOCYTES # BLD AUTO: 0.4 K/UL (ref 0–0.85)
MONOCYTES NFR BLD AUTO: 6 % (ref 0–13.4)
NEUTROPHILS # BLD AUTO: 3.14 K/UL (ref 1.82–7.42)
NEUTROPHILS NFR BLD: 46.9 % (ref 44–72)
NRBC # BLD AUTO: 0 K/UL
NRBC BLD-RTO: 0 /100 WBC (ref 0–0.2)
PLATELET # BLD AUTO: 312 K/UL (ref 164–446)
PMV BLD AUTO: 9.4 FL (ref 9–12.9)
POTASSIUM SERPL-SCNC: 4.1 MMOL/L (ref 3.6–5.5)
POTASSIUM SERPL-SCNC: 4.2 MMOL/L (ref 3.6–5.5)
PROT SERPL-MCNC: 6.5 G/DL (ref 6–8.2)
PROT SERPL-MCNC: 6.6 G/DL (ref 6–8.2)
RBC # BLD AUTO: 4.49 M/UL (ref 4.2–5.4)
SODIUM SERPL-SCNC: 141 MMOL/L (ref 135–145)
SODIUM SERPL-SCNC: 142 MMOL/L (ref 135–145)
TRIGL SERPL-MCNC: 89 MG/DL (ref 0–149)
TRIGL SERPL-MCNC: 89 MG/DL (ref 0–149)
TSH SERPL-ACNC: 1.5 UIU/ML (ref 0.35–5.5)
TSH SERPL-ACNC: 1.52 UIU/ML (ref 0.35–5.5)
VIT B12 SERPL-MCNC: 446 PG/ML (ref 211–911)
WBC # BLD AUTO: 6.7 K/UL (ref 4.8–10.8)

## 2024-08-03 LAB
EST. AVERAGE GLUCOSE BLD GHB EST-MCNC: 100 MG/DL
HBA1C MFR BLD: 5.1 % (ref 4–5.6)